# Patient Record
Sex: FEMALE | Race: WHITE | Employment: OTHER | URBAN - METROPOLITAN AREA
[De-identification: names, ages, dates, MRNs, and addresses within clinical notes are randomized per-mention and may not be internally consistent; named-entity substitution may affect disease eponyms.]

---

## 2018-12-19 PROBLEM — E11.21 TYPE 2 DIABETES WITH NEPHROPATHY (HCC): Status: ACTIVE | Noted: 2018-12-19

## 2020-06-17 ENCOUNTER — HOSPITAL ENCOUNTER (OUTPATIENT)
Dept: VASCULAR SURGERY | Age: 85
Discharge: HOME | End: 2020-06-17
Attending: INTERNAL MEDICINE
Payer: MEDICARE

## 2020-06-17 DIAGNOSIS — L98.9 LEG SKIN LESION, LEFT: ICD-10-CM

## 2020-06-17 DIAGNOSIS — I15.2 HYPERTENSION COMPLICATING DIABETES (HCC): ICD-10-CM

## 2020-06-17 DIAGNOSIS — E11.8 TYPE 2 DIABETES MELLITUS WITH COMPLICATION, WITHOUT LONG-TERM CURRENT USE OF INSULIN (HCC): ICD-10-CM

## 2020-06-17 DIAGNOSIS — Z86.718 HISTORY OF DVT (DEEP VEIN THROMBOSIS): ICD-10-CM

## 2020-06-17 DIAGNOSIS — E11.59 HYPERTENSION COMPLICATING DIABETES (HCC): ICD-10-CM

## 2020-06-17 DIAGNOSIS — G81.91 HEMIPARESIS, RIGHT (HCC): ICD-10-CM

## 2020-06-17 DIAGNOSIS — M79.89 LEFT LEG SWELLING: ICD-10-CM

## 2020-06-17 PROCEDURE — 93971 EXTREMITY STUDY: CPT

## 2022-03-19 PROBLEM — E11.21 TYPE 2 DIABETES WITH NEPHROPATHY (HCC): Status: ACTIVE | Noted: 2018-12-19

## 2022-05-12 ENCOUNTER — TRANSCRIBE ORDER (OUTPATIENT)
Dept: SCHEDULING | Age: 87
End: 2022-05-12

## 2022-05-12 DIAGNOSIS — R60.9 EDEMA: Primary | ICD-10-CM

## 2022-05-13 ENCOUNTER — HOSPITAL ENCOUNTER (OUTPATIENT)
Dept: VASCULAR SURGERY | Age: 87
Discharge: HOME | End: 2022-05-13
Attending: PODIATRIST
Payer: MEDICARE

## 2022-05-13 DIAGNOSIS — R60.9 EDEMA: ICD-10-CM

## 2022-05-13 PROCEDURE — 93971 EXTREMITY STUDY: CPT

## 2022-06-22 PROBLEM — D69.2 PURPURA (HCC): Status: ACTIVE | Noted: 2022-06-22

## 2023-11-14 ENCOUNTER — APPOINTMENT (OUTPATIENT)
Facility: HOSPITAL | Age: 88
End: 2023-11-14
Payer: MEDICARE

## 2023-11-14 ENCOUNTER — HOSPITAL ENCOUNTER (INPATIENT)
Facility: HOSPITAL | Age: 88
LOS: 4 days | Discharge: NURSING, REHAB OR LONG-TERM CARE | End: 2023-11-18
Attending: EMERGENCY MEDICINE | Admitting: STUDENT IN AN ORGANIZED HEALTH CARE EDUCATION/TRAINING PROGRAM
Payer: MEDICARE

## 2023-11-14 ENCOUNTER — APPOINTMENT (OUTPATIENT)
Facility: HOSPITAL | Age: 88
End: 2023-11-14
Attending: STUDENT IN AN ORGANIZED HEALTH CARE EDUCATION/TRAINING PROGRAM
Payer: MEDICARE

## 2023-11-14 DIAGNOSIS — J81.0 ACUTE PULMONARY EDEMA (HCC): ICD-10-CM

## 2023-11-14 DIAGNOSIS — U07.1 COVID-19: ICD-10-CM

## 2023-11-14 DIAGNOSIS — I1A.0 RESISTANT HYPERTENSION: Primary | ICD-10-CM

## 2023-11-14 DIAGNOSIS — J96.01 ACUTE RESPIRATORY FAILURE WITH HYPOXIA (HCC): ICD-10-CM

## 2023-11-14 DIAGNOSIS — I10 ACCELERATED HYPERTENSION: ICD-10-CM

## 2023-11-14 LAB
ALBUMIN SERPL-MCNC: 3.2 G/DL (ref 3.5–5)
ALBUMIN/GLOB SERPL: 0.7 (ref 1.1–2.2)
ALP SERPL-CCNC: 131 U/L (ref 45–117)
ALT SERPL-CCNC: 29 U/L (ref 12–78)
ANION GAP BLD CALC-SCNC: 10 (ref 10–20)
ANION GAP SERPL CALC-SCNC: 7 MMOL/L (ref 5–15)
APPEARANCE UR: ABNORMAL
APTT PPP: 28.5 SEC (ref 22.1–31)
AST SERPL-CCNC: 49 U/L (ref 15–37)
BACTERIA URNS QL MICRO: ABNORMAL /HPF
BASE DEFICIT BLD-SCNC: 3.7 MMOL/L
BASE DEFICIT BLDV-SCNC: 2.2 MMOL/L
BASOPHILS # BLD: 0 K/UL (ref 0–0.1)
BASOPHILS NFR BLD: 0 % (ref 0–1)
BDY SITE: ABNORMAL
BILIRUB SERPL-MCNC: 0.5 MG/DL (ref 0.2–1)
BILIRUB UR QL: NEGATIVE
BUN SERPL-MCNC: 48 MG/DL (ref 6–20)
BUN/CREAT SERPL: 35 (ref 12–20)
CA-I BLD-MCNC: 1.15 MMOL/L (ref 1.12–1.32)
CALCIUM SERPL-MCNC: 8.2 MG/DL (ref 8.5–10.1)
CHLORIDE BLD-SCNC: 101 MMOL/L (ref 100–108)
CHLORIDE SERPL-SCNC: 102 MMOL/L (ref 97–108)
CO2 BLD-SCNC: 25 MMOL/L (ref 19–24)
CO2 SERPL-SCNC: 26 MMOL/L (ref 21–32)
COLOR UR: ABNORMAL
CREAT SERPL-MCNC: 1.38 MG/DL (ref 0.55–1.02)
CREAT UR-MCNC: 1.3 MG/DL (ref 0.6–1.3)
CRP SERPL-MCNC: 7.04 MG/DL (ref 0–0.6)
DIFFERENTIAL METHOD BLD: ABNORMAL
ECHO AO ROOT DIAM: 3.1 CM
ECHO AO ROOT INDEX: 1.76 CM/M2
ECHO AR MAX VEL PISA: 4.4 M/S
ECHO AV AREA PEAK VELOCITY: 1.6 CM2
ECHO AV AREA VTI: 1.8 CM2
ECHO AV AREA/BSA VTI: 1 CM2/M2
ECHO AV CUSP MM: 1.3 CM
ECHO AV MEAN GRADIENT: 6 MMHG
ECHO AV MEAN VELOCITY: 1.2 M/S
ECHO AV PEAK GRADIENT: 11 MMHG
ECHO AV PEAK GRADIENT: 11 MMHG
ECHO AV PEAK VELOCITY: 1.7 M/S
ECHO AV PEAK VELOCITY: 1.7 M/S
ECHO AV REGURGITANT PHT: 323 MILLISECOND
ECHO AV VTI: 32.7 CM
ECHO BSA: 1.82 M2
ECHO EST RA PRESSURE: 10 MMHG
ECHO LA DIAMETER INDEX: 2.67 CM/M2
ECHO LA DIAMETER: 4.7 CM
ECHO LA TO AORTIC ROOT RATIO: 1.52
ECHO LA VOL A-L A2C: 109 ML (ref 22–52)
ECHO LA VOL A-L A4C: 121 ML (ref 22–52)
ECHO LA VOL MOD A2C: 105 ML (ref 22–52)
ECHO LA VOL MOD A4C: 106 ML (ref 22–52)
ECHO LA VOLUME AREA LENGTH: 117 ML
ECHO LA VOLUME INDEX A-L A2C: 62 ML/M2 (ref 16–34)
ECHO LA VOLUME INDEX A-L A4C: 69 ML/M2 (ref 16–34)
ECHO LA VOLUME INDEX AREA LENGTH: 66 ML/M2 (ref 16–34)
ECHO LA VOLUME INDEX MOD A2C: 60 ML/M2 (ref 16–34)
ECHO LA VOLUME INDEX MOD A4C: 60 ML/M2 (ref 16–34)
ECHO LV FRACTIONAL SHORTENING: 2 % (ref 28–44)
ECHO LV INTERNAL DIMENSION DIASTOLE INDEX: 2.5 CM/M2
ECHO LV INTERNAL DIMENSION DIASTOLIC: 4.4 CM (ref 3.9–5.3)
ECHO LV INTERNAL DIMENSION SYSTOLIC INDEX: 2.44 CM/M2
ECHO LV INTERNAL DIMENSION SYSTOLIC: 4.3 CM
ECHO LV IVSD: 1.3 CM (ref 0.6–0.9)
ECHO LV MASS 2D: 215.1 G (ref 67–162)
ECHO LV MASS INDEX 2D: 122.2 G/M2 (ref 43–95)
ECHO LV POSTERIOR WALL DIASTOLIC: 1.3 CM (ref 0.6–0.9)
ECHO LV RELATIVE WALL THICKNESS RATIO: 0.59
ECHO LVOT AREA: 3.5 CM2
ECHO LVOT AV VTI INDEX: 0.54
ECHO LVOT DIAM: 2.1 CM
ECHO LVOT MEAN GRADIENT: 1 MMHG
ECHO LVOT PEAK GRADIENT: 2 MMHG
ECHO LVOT PEAK GRADIENT: 3 MMHG
ECHO LVOT PEAK VELOCITY: 0.8 M/S
ECHO LVOT PEAK VELOCITY: 0.8 M/S
ECHO LVOT STROKE VOLUME INDEX: 34.6 ML/M2
ECHO LVOT SV: 60.9 ML
ECHO LVOT VTI: 17.6 CM
ECHO MV AREA PHT: 4.4 CM2
ECHO MV AREA VTI: 1.6 CM2
ECHO MV LVOT VTI INDEX: 2.2
ECHO MV MAX VELOCITY: 1.6 M/S
ECHO MV MEAN GRADIENT: 4 MMHG
ECHO MV MEAN VELOCITY: 0.9 M/S
ECHO MV PEAK GRADIENT: 10 MMHG
ECHO MV PRESSURE HALF TIME (PHT): 50.3 MS
ECHO MV REGURGITANT ALIASING (NYQUIST) VELOCITY: 42 CM/S
ECHO MV REGURGITANT RADIUS PISA: 0.66 CM
ECHO MV REGURGITANT VELOCITY PISA: 4.9 M/S
ECHO MV REGURGITANT VTIA: 169.1 CM
ECHO MV VTI: 38.8 CM
ECHO PV MAX VELOCITY: 0.6 M/S
ECHO PV PEAK GRADIENT: 1 MMHG
ECHO RIGHT VENTRICULAR SYSTOLIC PRESSURE (RVSP): 39 MMHG
ECHO RV FREE WALL PEAK S': 7 CM/S
ECHO RV INTERNAL DIMENSION: 4.4 CM
ECHO TV REGURGITANT MAX VELOCITY: 2.7 M/S
ECHO TV REGURGITANT PEAK GRADIENT: 29 MMHG
EKG ATRIAL RATE: 99 BPM
EKG DIAGNOSIS: NORMAL
EKG P AXIS: 35 DEGREES
EKG P-R INTERVAL: 158 MS
EKG Q-T INTERVAL: 354 MS
EKG QRS DURATION: 102 MS
EKG QTC CALCULATION (BAZETT): 454 MS
EKG R AXIS: 47 DEGREES
EKG T AXIS: -30 DEGREES
EKG VENTRICULAR RATE: 99 BPM
EOSINOPHIL # BLD: 0 K/UL (ref 0–0.4)
EOSINOPHIL NFR BLD: 1 % (ref 0–7)
EPITH CASTS URNS QL MICRO: ABNORMAL /LPF
ERYTHROCYTE [DISTWIDTH] IN BLOOD BY AUTOMATED COUNT: 14.1 % (ref 11.5–14.5)
ERYTHROCYTE [DISTWIDTH] IN BLOOD BY AUTOMATED COUNT: 14.5 % (ref 11.5–14.5)
ERYTHROCYTE [SEDIMENTATION RATE] IN BLOOD: 49 MM/HR (ref 0–30)
FIO2 ON VENT: 100 %
GLOBULIN SER CALC-MCNC: 4.7 G/DL (ref 2–4)
GLUCOSE BLD STRIP.AUTO-MCNC: 171 MG/DL (ref 74–106)
GLUCOSE SERPL-MCNC: 186 MG/DL (ref 65–100)
GLUCOSE UR STRIP.AUTO-MCNC: NEGATIVE MG/DL
HCO3 BLDA-SCNC: 24 MMOL/L
HCO3 BLDV-SCNC: 23 MMOL/L (ref 23–28)
HCT VFR BLD AUTO: 32.8 % (ref 35–47)
HCT VFR BLD AUTO: 36.2 % (ref 35–47)
HGB BLD-MCNC: 10.1 G/DL (ref 11.5–16)
HGB BLD-MCNC: 11.2 G/DL (ref 11.5–16)
HGB UR QL STRIP: NEGATIVE
HYALINE CASTS URNS QL MICRO: ABNORMAL /LPF (ref 0–5)
IMM GRANULOCYTES # BLD AUTO: 0.1 K/UL (ref 0–0.04)
IMM GRANULOCYTES NFR BLD AUTO: 1 % (ref 0–0.5)
INR PPP: 1.1 (ref 0.9–1.1)
KETONES UR QL STRIP.AUTO: NEGATIVE MG/DL
LACTATE BLD-SCNC: 1.86 MMOL/L (ref 0.4–2)
LEUKOCYTE ESTERASE UR QL STRIP.AUTO: ABNORMAL
LYMPHOCYTES # BLD: 1.7 K/UL (ref 0.8–3.5)
LYMPHOCYTES NFR BLD: 20 % (ref 12–49)
MCH RBC QN AUTO: 31.3 PG (ref 26–34)
MCH RBC QN AUTO: 32 PG (ref 26–34)
MCHC RBC AUTO-ENTMCNC: 30.8 G/DL (ref 30–36.5)
MCHC RBC AUTO-ENTMCNC: 30.9 G/DL (ref 30–36.5)
MCV RBC AUTO: 101.5 FL (ref 80–99)
MCV RBC AUTO: 103.4 FL (ref 80–99)
MONOCYTES # BLD: 0.7 K/UL (ref 0–1)
MONOCYTES NFR BLD: 9 % (ref 5–13)
NEUTS SEG # BLD: 6.1 K/UL (ref 1.8–8)
NEUTS SEG NFR BLD: 69 % (ref 32–75)
NITRITE UR QL STRIP.AUTO: NEGATIVE
NRBC # BLD: 0 K/UL (ref 0–0.01)
NRBC # BLD: 0 K/UL (ref 0–0.01)
NRBC BLD-RTO: 0 PER 100 WBC
NRBC BLD-RTO: 0 PER 100 WBC
NT PRO BNP: 8800 PG/ML
PCO2 BLDV: 38.8 MMHG (ref 41–51)
PCO2 BLDV: 56.4 MMHG (ref 41–51)
PEEP RESPIRATORY: 6
PH BLDV: 7.24 (ref 7.32–7.42)
PH BLDV: 7.38 (ref 7.32–7.42)
PH UR STRIP: 5.5 (ref 5–8)
PLATELET # BLD AUTO: 176 K/UL (ref 150–400)
PLATELET # BLD AUTO: 201 K/UL (ref 150–400)
PMV BLD AUTO: 10.6 FL (ref 8.9–12.9)
PMV BLD AUTO: 10.6 FL (ref 8.9–12.9)
PO2 BLDV: 57 MMHG (ref 25–40)
PO2 BLDV: <27 MMHG (ref 25–40)
POTASSIUM BLD-SCNC: 4.2 MMOL/L (ref 3.5–5.5)
POTASSIUM SERPL-SCNC: 4.1 MMOL/L (ref 3.5–5.1)
PROCALCITONIN SERPL-MCNC: 0.11 NG/ML
PROT SERPL-MCNC: 7.9 G/DL (ref 6.4–8.2)
PROT UR STRIP-MCNC: 30 MG/DL
PROTHROMBIN TIME: 11.4 SEC (ref 9–11.1)
RBC # BLD AUTO: 3.23 M/UL (ref 3.8–5.2)
RBC # BLD AUTO: 3.5 M/UL (ref 3.8–5.2)
RBC #/AREA URNS HPF: ABNORMAL /HPF (ref 0–5)
SAO2 % BLDV: 89 % (ref 65–88)
SAO2% DEVICE SAO2% SENSOR NAME: ABNORMAL
SARS-COV-2 RDRP RESP QL NAA+PROBE: DETECTED
SERVICE CMNT-IMP: ABNORMAL
SODIUM BLD-SCNC: 136 MMOL/L (ref 136–145)
SODIUM SERPL-SCNC: 135 MMOL/L (ref 136–145)
SOURCE: ABNORMAL
SP GR UR REFRACTOMETRY: 1.01
SPECIMEN SITE: ABNORMAL
SPECIMEN SITE: ABNORMAL
THERAPEUTIC RANGE: NORMAL SECS (ref 58–77)
TROPONIN I SERPL HS-MCNC: 2079 NG/L (ref 0–51)
TROPONIN I SERPL HS-MCNC: 882 NG/L (ref 0–51)
UFH PPP CHRO-ACNC: 0.75 IU/ML
UFH PPP CHRO-ACNC: <0.1 IU/ML
URINE CULTURE IF INDICATED: ABNORMAL
UROBILINOGEN UR QL STRIP.AUTO: 0.2 EU/DL (ref 0.2–1)
WBC # BLD AUTO: 6.9 K/UL (ref 3.6–11)
WBC # BLD AUTO: 8.6 K/UL (ref 3.6–11)
WBC URNS QL MICRO: ABNORMAL /HPF (ref 0–4)

## 2023-11-14 PROCEDURE — 81001 URINALYSIS AUTO W/SCOPE: CPT

## 2023-11-14 PROCEDURE — 6370000000 HC RX 637 (ALT 250 FOR IP): Performed by: STUDENT IN AN ORGANIZED HEALTH CARE EDUCATION/TRAINING PROGRAM

## 2023-11-14 PROCEDURE — 87077 CULTURE AEROBIC IDENTIFY: CPT

## 2023-11-14 PROCEDURE — 96365 THER/PROPH/DIAG IV INF INIT: CPT

## 2023-11-14 PROCEDURE — 3E0333Z INTRODUCTION OF ANTI-INFLAMMATORY INTO PERIPHERAL VEIN, PERCUTANEOUS APPROACH: ICD-10-PCS | Performed by: EMERGENCY MEDICINE

## 2023-11-14 PROCEDURE — 99285 EMERGENCY DEPT VISIT HI MDM: CPT

## 2023-11-14 PROCEDURE — 96375 TX/PRO/DX INJ NEW DRUG ADDON: CPT

## 2023-11-14 PROCEDURE — 84484 ASSAY OF TROPONIN QUANT: CPT

## 2023-11-14 PROCEDURE — 2580000003 HC RX 258: Performed by: STUDENT IN AN ORGANIZED HEALTH CARE EDUCATION/TRAINING PROGRAM

## 2023-11-14 PROCEDURE — 6360000002 HC RX W HCPCS: Performed by: EMERGENCY MEDICINE

## 2023-11-14 PROCEDURE — 85027 COMPLETE CBC AUTOMATED: CPT

## 2023-11-14 PROCEDURE — 2580000003 HC RX 258: Performed by: EMERGENCY MEDICINE

## 2023-11-14 PROCEDURE — 87040 BLOOD CULTURE FOR BACTERIA: CPT

## 2023-11-14 PROCEDURE — 82330 ASSAY OF CALCIUM: CPT

## 2023-11-14 PROCEDURE — 6360000002 HC RX W HCPCS: Performed by: NURSE PRACTITIONER

## 2023-11-14 PROCEDURE — 86140 C-REACTIVE PROTEIN: CPT

## 2023-11-14 PROCEDURE — 85730 THROMBOPLASTIN TIME PARTIAL: CPT

## 2023-11-14 PROCEDURE — 5A09357 ASSISTANCE WITH RESPIRATORY VENTILATION, LESS THAN 24 CONSECUTIVE HOURS, CONTINUOUS POSITIVE AIRWAY PRESSURE: ICD-10-PCS | Performed by: STUDENT IN AN ORGANIZED HEALTH CARE EDUCATION/TRAINING PROGRAM

## 2023-11-14 PROCEDURE — 2060000000 HC ICU INTERMEDIATE R&B

## 2023-11-14 PROCEDURE — XW0DXM6 INTRODUCTION OF BARICITINIB INTO MOUTH AND PHARYNX, EXTERNAL APPROACH, NEW TECHNOLOGY GROUP 6: ICD-10-PCS | Performed by: STUDENT IN AN ORGANIZED HEALTH CARE EDUCATION/TRAINING PROGRAM

## 2023-11-14 PROCEDURE — 85520 HEPARIN ASSAY: CPT

## 2023-11-14 PROCEDURE — 83880 ASSAY OF NATRIURETIC PEPTIDE: CPT

## 2023-11-14 PROCEDURE — 71045 X-RAY EXAM CHEST 1 VIEW: CPT

## 2023-11-14 PROCEDURE — C9113 INJ PANTOPRAZOLE SODIUM, VIA: HCPCS | Performed by: NURSE PRACTITIONER

## 2023-11-14 PROCEDURE — 96366 THER/PROPH/DIAG IV INF ADDON: CPT

## 2023-11-14 PROCEDURE — 87635 SARS-COV-2 COVID-19 AMP PRB: CPT

## 2023-11-14 PROCEDURE — 87086 URINE CULTURE/COLONY COUNT: CPT

## 2023-11-14 PROCEDURE — 82803 BLOOD GASES ANY COMBINATION: CPT

## 2023-11-14 PROCEDURE — 2580000003 HC RX 258: Performed by: NURSE PRACTITIONER

## 2023-11-14 PROCEDURE — 6360000002 HC RX W HCPCS: Performed by: STUDENT IN AN ORGANIZED HEALTH CARE EDUCATION/TRAINING PROGRAM

## 2023-11-14 PROCEDURE — 85025 COMPLETE CBC W/AUTO DIFF WBC: CPT

## 2023-11-14 PROCEDURE — 85610 PROTHROMBIN TIME: CPT

## 2023-11-14 PROCEDURE — 82947 ASSAY GLUCOSE BLOOD QUANT: CPT

## 2023-11-14 PROCEDURE — 93306 TTE W/DOPPLER COMPLETE: CPT

## 2023-11-14 PROCEDURE — A4216 STERILE WATER/SALINE, 10 ML: HCPCS | Performed by: NURSE PRACTITIONER

## 2023-11-14 PROCEDURE — 6360000004 HC RX CONTRAST MEDICATION: Performed by: STUDENT IN AN ORGANIZED HEALTH CARE EDUCATION/TRAINING PROGRAM

## 2023-11-14 PROCEDURE — 84145 PROCALCITONIN (PCT): CPT

## 2023-11-14 PROCEDURE — 87186 SC STD MICRODIL/AGAR DIL: CPT

## 2023-11-14 PROCEDURE — 93005 ELECTROCARDIOGRAM TRACING: CPT | Performed by: EMERGENCY MEDICINE

## 2023-11-14 PROCEDURE — 36415 COLL VENOUS BLD VENIPUNCTURE: CPT

## 2023-11-14 PROCEDURE — 84295 ASSAY OF SERUM SODIUM: CPT

## 2023-11-14 PROCEDURE — 71275 CT ANGIOGRAPHY CHEST: CPT

## 2023-11-14 PROCEDURE — 94660 CPAP INITIATION&MGMT: CPT

## 2023-11-14 PROCEDURE — 84132 ASSAY OF SERUM POTASSIUM: CPT

## 2023-11-14 PROCEDURE — 85652 RBC SED RATE AUTOMATED: CPT

## 2023-11-14 PROCEDURE — 6370000000 HC RX 637 (ALT 250 FOR IP): Performed by: EMERGENCY MEDICINE

## 2023-11-14 PROCEDURE — 80053 COMPREHEN METABOLIC PANEL: CPT

## 2023-11-14 PROCEDURE — 2700000000 HC OXYGEN THERAPY PER DAY

## 2023-11-14 RX ORDER — ATORVASTATIN CALCIUM 40 MG/1
40 TABLET, FILM COATED ORAL NIGHTLY
Status: DISCONTINUED | OUTPATIENT
Start: 2023-11-14 | End: 2023-11-18 | Stop reason: HOSPADM

## 2023-11-14 RX ORDER — FUROSEMIDE 10 MG/ML
40 INJECTION INTRAMUSCULAR; INTRAVENOUS 2 TIMES DAILY
Status: DISCONTINUED | OUTPATIENT
Start: 2023-11-14 | End: 2023-11-18 | Stop reason: HOSPADM

## 2023-11-14 RX ORDER — ASPIRIN 300 MG/1
300 SUPPOSITORY RECTAL
Status: COMPLETED | OUTPATIENT
Start: 2023-11-14 | End: 2023-11-14

## 2023-11-14 RX ORDER — SODIUM CHLORIDE 0.9 % (FLUSH) 0.9 %
5-40 SYRINGE (ML) INJECTION EVERY 12 HOURS SCHEDULED
Status: DISCONTINUED | OUTPATIENT
Start: 2023-11-14 | End: 2023-11-18 | Stop reason: HOSPADM

## 2023-11-14 RX ORDER — SODIUM CHLORIDE 9 MG/ML
INJECTION, SOLUTION INTRAVENOUS PRN
Status: DISCONTINUED | OUTPATIENT
Start: 2023-11-14 | End: 2023-11-18 | Stop reason: HOSPADM

## 2023-11-14 RX ORDER — SODIUM CHLORIDE 0.9 % (FLUSH) 0.9 %
5-40 SYRINGE (ML) INJECTION PRN
Status: DISCONTINUED | OUTPATIENT
Start: 2023-11-14 | End: 2023-11-18 | Stop reason: HOSPADM

## 2023-11-14 RX ORDER — POLYETHYLENE GLYCOL 3350 17 G/17G
17 POWDER, FOR SOLUTION ORAL DAILY PRN
Status: DISCONTINUED | OUTPATIENT
Start: 2023-11-14 | End: 2023-11-18 | Stop reason: HOSPADM

## 2023-11-14 RX ORDER — IPRATROPIUM BROMIDE AND ALBUTEROL SULFATE 2.5; .5 MG/3ML; MG/3ML
1 SOLUTION RESPIRATORY (INHALATION) EVERY 4 HOURS PRN
Status: DISCONTINUED | OUTPATIENT
Start: 2023-11-14 | End: 2023-11-18 | Stop reason: HOSPADM

## 2023-11-14 RX ORDER — ASPIRIN 81 MG/1
81 TABLET ORAL DAILY
Status: CANCELLED | OUTPATIENT
Start: 2023-11-14

## 2023-11-14 RX ORDER — HEPARIN SODIUM 10000 [USP'U]/100ML
5-30 INJECTION, SOLUTION INTRAVENOUS CONTINUOUS
Status: DISCONTINUED | OUTPATIENT
Start: 2023-11-14 | End: 2023-11-14

## 2023-11-14 RX ORDER — HEPARIN SODIUM 1000 [USP'U]/ML
4000 INJECTION, SOLUTION INTRAVENOUS; SUBCUTANEOUS PRN
Status: DISCONTINUED | OUTPATIENT
Start: 2023-11-14 | End: 2023-11-14

## 2023-11-14 RX ORDER — FUROSEMIDE 10 MG/ML
40 INJECTION INTRAMUSCULAR; INTRAVENOUS 2 TIMES DAILY
Status: DISCONTINUED | OUTPATIENT
Start: 2023-11-14 | End: 2023-11-14

## 2023-11-14 RX ORDER — METOPROLOL SUCCINATE 25 MG/1
25 TABLET, EXTENDED RELEASE ORAL DAILY
Status: DISCONTINUED | OUTPATIENT
Start: 2023-11-14 | End: 2023-11-18 | Stop reason: HOSPADM

## 2023-11-14 RX ORDER — NITROGLYCERIN 20 MG/100ML
5-200 INJECTION INTRAVENOUS CONTINUOUS
Status: DISCONTINUED | OUTPATIENT
Start: 2023-11-14 | End: 2023-11-14

## 2023-11-14 RX ORDER — ACETAMINOPHEN 650 MG/1
650 SUPPOSITORY RECTAL EVERY 6 HOURS PRN
Status: DISCONTINUED | OUTPATIENT
Start: 2023-11-14 | End: 2023-11-18 | Stop reason: HOSPADM

## 2023-11-14 RX ORDER — CASTOR OIL AND BALSAM, PERU 788; 87 MG/G; MG/G
OINTMENT TOPICAL 2 TIMES DAILY
Status: DISCONTINUED | OUTPATIENT
Start: 2023-11-14 | End: 2023-11-18 | Stop reason: HOSPADM

## 2023-11-14 RX ORDER — ASPIRIN 81 MG/1
81 TABLET, CHEWABLE ORAL DAILY
Status: DISCONTINUED | OUTPATIENT
Start: 2023-11-15 | End: 2023-11-18 | Stop reason: HOSPADM

## 2023-11-14 RX ORDER — HEPARIN SODIUM 1000 [USP'U]/ML
2000 INJECTION, SOLUTION INTRAVENOUS; SUBCUTANEOUS PRN
Status: DISCONTINUED | OUTPATIENT
Start: 2023-11-14 | End: 2023-11-14

## 2023-11-14 RX ORDER — ONDANSETRON 4 MG/1
4 TABLET, ORALLY DISINTEGRATING ORAL EVERY 8 HOURS PRN
Status: DISCONTINUED | OUTPATIENT
Start: 2023-11-14 | End: 2023-11-18 | Stop reason: HOSPADM

## 2023-11-14 RX ORDER — ACETAMINOPHEN 325 MG/1
650 TABLET ORAL EVERY 6 HOURS PRN
Status: DISCONTINUED | OUTPATIENT
Start: 2023-11-14 | End: 2023-11-18 | Stop reason: HOSPADM

## 2023-11-14 RX ORDER — ONDANSETRON 2 MG/ML
4 INJECTION INTRAMUSCULAR; INTRAVENOUS EVERY 6 HOURS PRN
Status: DISCONTINUED | OUTPATIENT
Start: 2023-11-14 | End: 2023-11-18 | Stop reason: HOSPADM

## 2023-11-14 RX ORDER — DEXAMETHASONE SODIUM PHOSPHATE 10 MG/ML
10 INJECTION, SOLUTION INTRAMUSCULAR; INTRAVENOUS ONCE
Status: COMPLETED | OUTPATIENT
Start: 2023-11-14 | End: 2023-11-14

## 2023-11-14 RX ORDER — HEPARIN SODIUM 1000 [USP'U]/ML
4000 INJECTION, SOLUTION INTRAVENOUS; SUBCUTANEOUS ONCE
Status: COMPLETED | OUTPATIENT
Start: 2023-11-14 | End: 2023-11-14

## 2023-11-14 RX ORDER — DEXAMETHASONE SODIUM PHOSPHATE 10 MG/ML
6 INJECTION, SOLUTION INTRAMUSCULAR; INTRAVENOUS EVERY 24 HOURS
Status: DISCONTINUED | OUTPATIENT
Start: 2023-11-15 | End: 2023-11-18 | Stop reason: HOSPADM

## 2023-11-14 RX ADMIN — CEFEPIME 1000 MG: 1 INJECTION, POWDER, FOR SOLUTION INTRAMUSCULAR; INTRAVENOUS at 15:43

## 2023-11-14 RX ADMIN — FUROSEMIDE 40 MG: 10 INJECTION, SOLUTION INTRAMUSCULAR; INTRAVENOUS at 17:01

## 2023-11-14 RX ADMIN — VANCOMYCIN HYDROCHLORIDE 1500 MG: 1.5 INJECTION, POWDER, LYOPHILIZED, FOR SOLUTION INTRAVENOUS at 06:24

## 2023-11-14 RX ADMIN — NITROGLYCERIN 5 MCG/MIN: 20 INJECTION INTRAVENOUS at 03:05

## 2023-11-14 RX ADMIN — METOPROLOL SUCCINATE 25 MG: 25 TABLET, EXTENDED RELEASE ORAL at 11:15

## 2023-11-14 RX ADMIN — AZITHROMYCIN MONOHYDRATE 500 MG: 500 INJECTION, POWDER, LYOPHILIZED, FOR SOLUTION INTRAVENOUS at 05:14

## 2023-11-14 RX ADMIN — IOPAMIDOL 100 ML: 755 INJECTION, SOLUTION INTRAVENOUS at 12:27

## 2023-11-14 RX ADMIN — SODIUM CHLORIDE, PRESERVATIVE FREE 40 MG: 5 INJECTION INTRAVENOUS at 11:16

## 2023-11-14 RX ADMIN — HEPARIN SODIUM 4000 UNITS: 1000 INJECTION INTRAVENOUS; SUBCUTANEOUS at 05:25

## 2023-11-14 RX ADMIN — SODIUM CHLORIDE, PRESERVATIVE FREE 10 ML: 5 INJECTION INTRAVENOUS at 11:21

## 2023-11-14 RX ADMIN — DEXAMETHASONE SODIUM PHOSPHATE 10 MG: 10 INJECTION, SOLUTION INTRAMUSCULAR; INTRAVENOUS at 04:19

## 2023-11-14 RX ADMIN — Medication: at 19:59

## 2023-11-14 RX ADMIN — ATORVASTATIN CALCIUM 40 MG: 40 TABLET, FILM COATED ORAL at 19:57

## 2023-11-14 RX ADMIN — ASPIRIN 300 MG: 300 SUPPOSITORY RECTAL at 04:48

## 2023-11-14 RX ADMIN — SODIUM CHLORIDE, PRESERVATIVE FREE 10 ML: 5 INJECTION INTRAVENOUS at 19:59

## 2023-11-14 RX ADMIN — CEFEPIME 2000 MG: 2 INJECTION, POWDER, FOR SOLUTION INTRAVENOUS at 04:42

## 2023-11-14 RX ADMIN — HEPARIN SODIUM AND DEXTROSE 12 UNITS/KG/HR: 10000; 5 INJECTION INTRAVENOUS at 05:29

## 2023-11-14 ASSESSMENT — PAIN - FUNCTIONAL ASSESSMENT: PAIN_FUNCTIONAL_ASSESSMENT: 0-10

## 2023-11-14 ASSESSMENT — ENCOUNTER SYMPTOMS
SHORTNESS OF BREATH: 1
COUGH: 1

## 2023-11-14 ASSESSMENT — PAIN SCALES - GENERAL
PAINLEVEL_OUTOF10: 0

## 2023-11-14 NOTE — CONSULTS
11/14/2023 9:51:00 AM     Urinalysis with Reflex to Culture    Collection Time: 11/14/23  3:41 AM    Specimen: Urine   Result Value Ref Range    Color, UA YELLOW/STRAW      Appearance CLOUDY (A) CLEAR      Specific Gravity, UA 1.010      pH, Urine 5.5 5.0 - 8.0      Protein, UA 30 (A) NEG mg/dL    Glucose, UA Negative NEG mg/dL    Ketones, Urine Negative NEG mg/dL    Bilirubin Urine Negative NEG      Blood, Urine Negative NEG      Urobilinogen, Urine 0.2 0.2 - 1.0 EU/dL    Nitrite, Urine Negative NEG      Leukocyte Esterase, Urine MODERATE (A) NEG      WBC, UA  0 - 4 /hpf    RBC, UA 0-5 0 - 5 /hpf    Epithelial Cells UA FEW FEW /lpf    BACTERIA, URINE 4+ (A) NEG /hpf    Urine Culture if Indicated URINE CULTURE ORDERED (A) CNI      Hyaline Casts, UA 0-2 0 - 5 /lpf   Blood Culture 1    Collection Time: 11/14/23  4:25 AM    Specimen: Blood   Result Value Ref Range    Special Requests RIGHT  FOREARM        Culture NO GROWTH AFTER 1 HOUR     Blood Culture 2    Collection Time: 11/14/23  4:25 AM    Specimen: Blood   Result Value Ref Range    Special Requests LEFT  Antecubital        Culture NO GROWTH AFTER 1 HOUR     Procalcitonin    Collection Time: 11/14/23  4:25 AM   Result Value Ref Range    Procalcitonin 0.11 ng/mL   Sedimentation Rate    Collection Time: 11/14/23  4:25 AM   Result Value Ref Range    Sed Rate, Automated 49 (H) 0 - 30 mm/hr   CBC    Collection Time: 11/14/23  4:41 AM   Result Value Ref Range    WBC 6.9 3.6 - 11.0 K/uL    RBC 3.23 (L) 3.80 - 5.20 M/uL    Hemoglobin 10.1 (L) 11.5 - 16.0 g/dL    Hematocrit 32.8 (L) 35.0 - 47.0 %    .5 (H) 80.0 - 99.0 FL    MCH 31.3 26.0 - 34.0 PG    MCHC 30.8 30.0 - 36.5 g/dL    RDW 14.1 11.5 - 14.5 %    Platelets 097 420 - 886 K/uL    MPV 10.6 8.9 - 12.9 FL    Nucleated RBCs 0.0 0  WBC    nRBC 0.00 0.00 - 0.01 K/uL   APTT    Collection Time: 11/14/23  4:41 AM   Result Value Ref Range    PTT 28.5 22.1 - 31.0 sec    Therapeutic Range   58.0 - 77.0 SECS Protime-INR    Collection Time: 11/14/23  4:41 AM   Result Value Ref Range    INR 1.1 0.9 - 1.1      Protime 11.4 (H) 9.0 - 11.1 sec   Heparin, Anti-Xa    Collection Time: 11/14/23  4:41 AM   Result Value Ref Range    Heparin Xa,LMWH and Unfrac <0.10 IU/mL   Troponin    Collection Time: 11/14/23  8:04 AM   Result Value Ref Range    Troponin, High Sensitivity 2,079 (HH) 0 - 51 ng/L   Blood Gas, Venous    Collection Time: 11/14/23  8:49 AM   Result Value Ref Range    pH, Leonid 7.38 7.32 - 7.42      pCO2, Leonid 38.8 (L) 41 - 51 mmHg    PO2, Leonid 57 (H) 25 - 40 mmHg    BICARBONATE, VENOUS 23 23 - 28 mmol/L    Base Deficit, Venous 2.2 mmol/L    O2 Sat, Leonid 89 (H) 65 - 88 %    O2 Method BIPAP      FIO2 Arterial 100 %    POC PEEP/CPA 6.0      Source VENOUS BLOOD      Site RIGHT RADIAL     Heparin, Anti-Xa    Collection Time: 11/14/23 11:20 AM   Result Value Ref Range    Heparin Xa,LMWH and Unfrac 0.75 IU/mL   C-Reactive Protein    Collection Time: 11/14/23 11:20 AM   Result Value Ref Range    CRP 7.04 (H) 0.00 - 0.60 mg/dL

## 2023-11-14 NOTE — ED NOTES
Assumed care of pt at this time. Heparin drip verified with offgoing RN Suhail Banegas.  Pt adjusted in bed, daughter at bedside, update given     Roxann Villeda RN  11/14/23 4495

## 2023-11-14 NOTE — ED NOTES
Hospitalist perfect served at this time about trialing pt off BIPAP.      Sophy Ruffin RN  11/14/23 1962

## 2023-11-14 NOTE — PROGRESS NOTES
Physical Therapy    Received PT order. Chart reviewed. Pt on BiPap this am, on hep drip with NSTEMI and positive for COVID. Pt still too acute to tolerate eval. Will defer and follow.     Chiquita Hartman PT

## 2023-11-14 NOTE — H&P
Hospitalist Admission Note    NAME:   Osei Arzate   : 1927   MRN: [id number]     Date/Time: 2023 5:48 AM    Patient PCP: Tre Bowser MD    ______________________________________________________________________  Given the patient's current clinical presentation, I have a high level of concern for decompensation if discharged from the emergency department. Complex decision making was performed, which includes reviewing the patient's available past medical records, laboratory results, and x-ray films. My assessment of this patient's clinical condition and my plan of care is as follows. Assessment / Plan:  Acute hypoxemic respiratory failure  COVID-pneumonia  Bilateral pleural effusion  Chest x-ray showed congestion with interstitial edema mainly in the right LL  We will get CTA of the chest  Patient currently on BiPAP  Patient DNR/DNI  Stable for PCU per ICU eval  Start empirically on broad-spectrum antibiotic  Azithromycin for atypical pneumonia coverage  We will consult pulm  DuoNeb as needed and scheduled  Decadron 6 Mg daily  Repeat ABG      NSTEMI  S/p nitro drip  Elevated proBNP 8K  Start patient on heparin drip  Cardio consult  Continue statin, metoprolol  We will start patient on aspirin  We will get echo      HTN  Blood pressure borderline  Hold home amlodipine      DAPHNIE  We will be cautious with IV fluid  Diuresis as needed  Repeat BMP tomorrow    HLD  History of CVA, right hemiparesis    Diabetes mellitus  We will add sliding scale     P T A trigeminal neuralgia  PTA skin lesions  Not sure if the patient still on oxcarbazepine   We will get pharmacy med rec    Medical Decision Making:   I personally reviewed labs: Yes  I personally reviewed imaging: Yes  I personally reviewed EKG:  Toxic drug monitoring: Yes  Discussed case with: ED provider. After discussion I am in agreement that acuity of patient's medical condition necessitates hospital stay.       Code Status:

## 2023-11-14 NOTE — PROGRESS NOTES
Received OT order. Chart reviewed. Pt on BiPap this am, on hep drip with NSTEMI and positive for COVID. Pt still too acute to tolerate eval. Will defer and follow.

## 2023-11-14 NOTE — PROGRESS NOTES
4 Eyes Skin Assessment     NAME:  Jennifer Mays  YOB: 1927  MEDICAL RECORD NUMBER:  [id number]    The patient is being assessed for  Admission    I agree that at least one RN has performed a thorough Head to Toe Skin Assessment on the patient. ALL assessment sites listed below have been assessed. Areas assessed by both nurses:    Head, Face, Ears, Arms, Elbows, Hands, Sacrum. Buttock, Coccyx, Ischium, and Legs. Feet and Heels        Does the Patient have a Wound? Yes wound(s) were present on assessment.  LDA wound assessment was Initiated and completed by RN, Skin tags, boggy heels, blanchable redness on sacrum, skin breakdown due to moisture under breast and groin areas       Jaciel Prevention initiated by RN: Yes  Wound Care Orders initiated by RN: Yes    Pressure Injury (Stage 3,4, Unstageable, DTI, NWPT, and Complex wounds) if present, place Wound referral order by RN under : No    New Ostomies, if present place, Ostomy referral order under : No     Nurse 1 eSignature: Electronically signed by Bakari Bolton RN on 11/14/23 at 6:27 PM EST    **SHARE this note so that the co-signing nurse can place an eSignature**    Nurse 2 eSignature: Electronically signed by Samra Art RN on 11/14/23 at 6:32 PM EST

## 2023-11-14 NOTE — ED NOTES
Verified with [facility] in Pharmacy regarding Heparin. One time initial bolus 4000units. Starting drip at 12 units/kg/hr.      Kali Ortiz RN  11/14/23 4686

## 2023-11-14 NOTE — ED NOTES
Bedside shift change report given to SHA Solomon (oncoming nurse) by Mario Neff RN (offgoing nurse). Report included the following information Nurse Handoff Report, ED SBAR, MAR, Recent Results, and Neuro Assessment.           Kateryna Burgos RN  11/14/23 0059

## 2023-11-14 NOTE — ED PROVIDER NOTES
MRM [id number]       Pt Name: Juli Marx  MRN: [id number]  [facility] 5/23/1927  Date of evaluation: 11/14/2023  Provider: Rock Vahid DO   PCP: Alan Monet MD  Note Started: 2:45 AM EST 11/14/23     CHIEF COMPLAINT       Chief Complaint   Patient presents with    Respiratory Distress     Pt arrives via EMS from [address] [place] [address]. Pt KARLA throughout day and tried to go to bed but was having difficulty. EMS arrived on scene and was 70% RA, auditory rhales, edema in lower extremities. EMS put on CPAP at 93%. EMS gave 40mg lasix and 1inch nitro paste. BG for . HISTORY OF PRESENT ILLNESS: 1 or more elements      History From: EMS, History limited by: Clinical condition     Juli Marx is a 80 y.o. female with a past medical history significant for hypertension, prior stroke, diabetes presented the emergency department in respiratory distress. EMS found the patient to be hypoxic on room air with oxygen saturations in the 70s. She was reportedly tachypneic and had Rales on exam.  Moderately hypertensive. Was started on noninvasive positive pressure ventilation prehospital, given 40 of Lasix and an inch of Nitropaste. EMS reports significant improvement. Please See MDM for Additional Details of the HPI/PMH  Nursing Notes were all reviewed and agreed with or any disagreements were addressed in the HPI. REVIEW OF SYSTEMS        Positives and Pertinent negatives as per HPI. PAST HISTORY     Past Medical History:  Past Medical History:   Diagnosis Date    Arthritis     RASHARD KNEES.     C1 cervical fracture ([address]) 7/19/2013 6/9/13    Cancer ([address])     SCC skin ca from left face    DJD (degenerative joint disease) of knee 07/15/2011    Hyperlipidemia 07/07/2016    Hypertension complicating diabetes ([address]) 09/08/2000    Ill-defined condition 6/2013    C1 FX AFTER FALL    Pressure ulcer of left buttock, stage 3 ([address]) 10/19/2016    About 7/23/16    Stroke ([address])     Trigeminal neuralgia 7/15/2011    Type 2 diabetes mellitus with complication, without long-term current use of insulin ([address]) 11/23/2016       Past Surgical History:  Past Surgical History:   Procedure Laterality Date    GYN  1982    D and C    HEENT      tonsillectomy    IR IVC FILTER PLACEMENT W IMAGING      OTHER SURGICAL HISTORY      BCC skin ca. removed -face       Family History:  Family History   Problem Relation Age of Onset    Osteoarthritis Sister     Heart Attack Father     Stroke Mother        Social History:  Social History     Tobacco Use    Smoking status: Never    Smokeless tobacco: Never   Substance Use Topics    Alcohol use: Yes    Drug use: Not Currently       Allergies: Allergies   Allergen Reactions    Lisinopril      Other reaction(s): Unknown (comments)       CURRENT MEDICATIONS      Current Discharge Medication List        CONTINUE these medications which have NOT CHANGED    Details   !! OXcarbazepine (TRILEPTAL) 150 MG tablet Take one tablet twice a day  Qty: 60 tablet, Refills: 11      metoprolol succinate (TOPROL XL) 25 MG extended release tablet TAKE 1 TABLET BY MOUTH ONCE A DAY FOR HYPERTENSION  Qty: 27 tablet, Refills: 11      !! OXcarbazepine (TRILEPTAL) 150 MG tablet Take 1 tablet by mouth 2 times daily      !! pravastatin (PRAVACHOL) 10 MG tablet Take 1 tablet by mouth daily  Qty: 90 tablet, Refills: 3    Associated Diagnoses: Type 2 diabetes mellitus with other diabetic neurological complication ([address]); Hypercholesterolemia; Encounter for long-term (current) use of medications      acetaminophen (TYLENOL) 500 MG tablet Take by mouth every 6 hours as needed      amLODIPine (NORVASC) 2.5 MG tablet TAKE 1 TABLET DAILY      diclofenac sodium (VOLTAREN) 1 % GEL Apply 4 g topically 4 times daily      !! pravastatin (PRAVACHOL) 10 MG tablet TAKE 1 TABLET NIGHTLY       !! - Potential duplicate medications found. Please discuss with provider.           SCREENINGS

## 2023-11-14 NOTE — ED NOTES
Report called to PCU at this time.  Pt to be taken up on [facility], [address] [place] [address], RN  11/14/23 6052

## 2023-11-14 NOTE — ED NOTES
Message sent to pharmacy regarding both inter lapping Vancomycin meds, awaiting response     Caitlyn Brumfield RN  11/14/23 0163

## 2023-11-14 NOTE — PROGRESS NOTES
Patient seen and evaluated in ED room. For details see H and p done by my colleague earlier today. She has been weaned to high flow, on my evaluation on 10 liters oxygen. CTA chest reviewed: shows no PE, has bilteral infiltrates with bilateral effusions. Covid also came back positive    Added lasix 40 mg q12 hours  Continue decadron  Pharmacy consult for barcitinib  Continue emperic abx  Also has elevated troponin likely demand ischemia  Cardiology has been consulted.     Non billable note

## 2023-11-14 NOTE — ED NOTES
Verified with [facility] in Pharmacy to do the 1500 mg of Vancomycin.       Santa Augustin, RN  11/14/23 9420

## 2023-11-15 LAB
ANION GAP SERPL CALC-SCNC: 5 MMOL/L (ref 5–15)
BACTERIA SPEC CULT: NORMAL
BACTERIA SPEC CULT: NORMAL
BASOPHILS # BLD: 0 K/UL (ref 0–0.1)
BASOPHILS NFR BLD: 0 % (ref 0–1)
BUN SERPL-MCNC: 45 MG/DL (ref 6–20)
BUN/CREAT SERPL: 33 (ref 12–20)
CALCIUM SERPL-MCNC: 8.5 MG/DL (ref 8.5–10.1)
CHLORIDE SERPL-SCNC: 104 MMOL/L (ref 97–108)
CO2 SERPL-SCNC: 29 MMOL/L (ref 21–32)
CREAT SERPL-MCNC: 1.35 MG/DL (ref 0.55–1.02)
DIFFERENTIAL METHOD BLD: ABNORMAL
EOSINOPHIL # BLD: 0 K/UL (ref 0–0.4)
EOSINOPHIL NFR BLD: 0 % (ref 0–7)
ERYTHROCYTE [DISTWIDTH] IN BLOOD BY AUTOMATED COUNT: 14.3 % (ref 11.5–14.5)
GLUCOSE BLD STRIP.AUTO-MCNC: 147 MG/DL (ref 65–117)
GLUCOSE BLD STRIP.AUTO-MCNC: 159 MG/DL (ref 65–117)
GLUCOSE SERPL-MCNC: 118 MG/DL (ref 65–100)
HCT VFR BLD AUTO: 33.9 % (ref 35–47)
HGB BLD-MCNC: 10.6 G/DL (ref 11.5–16)
IMM GRANULOCYTES # BLD AUTO: 0 K/UL (ref 0–0.04)
IMM GRANULOCYTES NFR BLD AUTO: 0 % (ref 0–0.5)
LYMPHOCYTES # BLD: 1.3 K/UL (ref 0.8–3.5)
LYMPHOCYTES NFR BLD: 15 % (ref 12–49)
MCH RBC QN AUTO: 31.4 PG (ref 26–34)
MCHC RBC AUTO-ENTMCNC: 31.3 G/DL (ref 30–36.5)
MCV RBC AUTO: 100.3 FL (ref 80–99)
MONOCYTES # BLD: 0.6 K/UL (ref 0–1)
MONOCYTES NFR BLD: 7 % (ref 5–13)
NEUTS SEG # BLD: 6.8 K/UL (ref 1.8–8)
NEUTS SEG NFR BLD: 78 % (ref 32–75)
NRBC # BLD: 0 K/UL (ref 0–0.01)
NRBC BLD-RTO: 0 PER 100 WBC
NT PRO BNP: ABNORMAL PG/ML
PLATELET # BLD AUTO: 187 K/UL (ref 150–400)
PMV BLD AUTO: 11.1 FL (ref 8.9–12.9)
POTASSIUM SERPL-SCNC: 3.8 MMOL/L (ref 3.5–5.1)
RBC # BLD AUTO: 3.38 M/UL (ref 3.8–5.2)
SERVICE CMNT-IMP: ABNORMAL
SERVICE CMNT-IMP: ABNORMAL
SERVICE CMNT-IMP: NORMAL
SODIUM SERPL-SCNC: 138 MMOL/L (ref 136–145)
VANCOMYCIN SERPL-MCNC: 13.6 UG/ML
WBC # BLD AUTO: 8.7 K/UL (ref 3.6–11)

## 2023-11-15 PROCEDURE — 97535 SELF CARE MNGMENT TRAINING: CPT

## 2023-11-15 PROCEDURE — 6370000000 HC RX 637 (ALT 250 FOR IP): Performed by: STUDENT IN AN ORGANIZED HEALTH CARE EDUCATION/TRAINING PROGRAM

## 2023-11-15 PROCEDURE — 36415 COLL VENOUS BLD VENIPUNCTURE: CPT

## 2023-11-15 PROCEDURE — C9113 INJ PANTOPRAZOLE SODIUM, VIA: HCPCS | Performed by: NURSE PRACTITIONER

## 2023-11-15 PROCEDURE — 97166 OT EVAL MOD COMPLEX 45 MIN: CPT

## 2023-11-15 PROCEDURE — 97162 PT EVAL MOD COMPLEX 30 MIN: CPT

## 2023-11-15 PROCEDURE — 2060000000 HC ICU INTERMEDIATE R&B

## 2023-11-15 PROCEDURE — 6370000000 HC RX 637 (ALT 250 FOR IP): Performed by: NURSE PRACTITIONER

## 2023-11-15 PROCEDURE — 97530 THERAPEUTIC ACTIVITIES: CPT

## 2023-11-15 PROCEDURE — 82962 GLUCOSE BLOOD TEST: CPT

## 2023-11-15 PROCEDURE — 80202 ASSAY OF VANCOMYCIN: CPT

## 2023-11-15 PROCEDURE — 2700000000 HC OXYGEN THERAPY PER DAY

## 2023-11-15 PROCEDURE — 85025 COMPLETE CBC W/AUTO DIFF WBC: CPT

## 2023-11-15 PROCEDURE — 80048 BASIC METABOLIC PNL TOTAL CA: CPT

## 2023-11-15 PROCEDURE — 83880 ASSAY OF NATRIURETIC PEPTIDE: CPT

## 2023-11-15 PROCEDURE — A4216 STERILE WATER/SALINE, 10 ML: HCPCS | Performed by: NURSE PRACTITIONER

## 2023-11-15 PROCEDURE — 6360000002 HC RX W HCPCS: Performed by: NURSE PRACTITIONER

## 2023-11-15 PROCEDURE — 2580000003 HC RX 258: Performed by: STUDENT IN AN ORGANIZED HEALTH CARE EDUCATION/TRAINING PROGRAM

## 2023-11-15 PROCEDURE — 2580000003 HC RX 258: Performed by: NURSE PRACTITIONER

## 2023-11-15 PROCEDURE — 6360000002 HC RX W HCPCS: Performed by: STUDENT IN AN ORGANIZED HEALTH CARE EDUCATION/TRAINING PROGRAM

## 2023-11-15 RX ORDER — INSULIN LISPRO 100 [IU]/ML
0-8 INJECTION, SOLUTION INTRAVENOUS; SUBCUTANEOUS
Status: DISCONTINUED | OUTPATIENT
Start: 2023-11-15 | End: 2023-11-18 | Stop reason: HOSPADM

## 2023-11-15 RX ORDER — INSULIN LISPRO 100 [IU]/ML
0-4 INJECTION, SOLUTION INTRAVENOUS; SUBCUTANEOUS NIGHTLY
Status: DISCONTINUED | OUTPATIENT
Start: 2023-11-15 | End: 2023-11-18 | Stop reason: HOSPADM

## 2023-11-15 RX ORDER — HEPARIN SODIUM 5000 [USP'U]/ML
5000 INJECTION, SOLUTION INTRAVENOUS; SUBCUTANEOUS EVERY 8 HOURS SCHEDULED
Status: DISCONTINUED | OUTPATIENT
Start: 2023-11-15 | End: 2023-11-18 | Stop reason: HOSPADM

## 2023-11-15 RX ORDER — DEXTROSE MONOHYDRATE 100 MG/ML
INJECTION, SOLUTION INTRAVENOUS CONTINUOUS PRN
Status: DISCONTINUED | OUTPATIENT
Start: 2023-11-15 | End: 2023-11-18 | Stop reason: HOSPADM

## 2023-11-15 RX ADMIN — SODIUM CHLORIDE, PRESERVATIVE FREE 10 ML: 5 INJECTION INTRAVENOUS at 20:35

## 2023-11-15 RX ADMIN — AZITHROMYCIN MONOHYDRATE 500 MG: 500 INJECTION, POWDER, LYOPHILIZED, FOR SOLUTION INTRAVENOUS at 08:11

## 2023-11-15 RX ADMIN — ASPIRIN 81 MG: 81 TABLET, CHEWABLE ORAL at 08:13

## 2023-11-15 RX ADMIN — DEXAMETHASONE SODIUM PHOSPHATE 6 MG: 10 INJECTION, SOLUTION INTRAMUSCULAR; INTRAVENOUS at 04:03

## 2023-11-15 RX ADMIN — FUROSEMIDE 40 MG: 10 INJECTION, SOLUTION INTRAMUSCULAR; INTRAVENOUS at 08:12

## 2023-11-15 RX ADMIN — CEFEPIME 1000 MG: 1 INJECTION, POWDER, FOR SOLUTION INTRAMUSCULAR; INTRAVENOUS at 04:00

## 2023-11-15 RX ADMIN — HEPARIN SODIUM 5000 UNITS: 5000 INJECTION INTRAVENOUS; SUBCUTANEOUS at 17:46

## 2023-11-15 RX ADMIN — ATORVASTATIN CALCIUM 40 MG: 40 TABLET, FILM COATED ORAL at 20:35

## 2023-11-15 RX ADMIN — FUROSEMIDE 40 MG: 10 INJECTION, SOLUTION INTRAMUSCULAR; INTRAVENOUS at 17:46

## 2023-11-15 RX ADMIN — CEFEPIME 1000 MG: 1 INJECTION, POWDER, FOR SOLUTION INTRAMUSCULAR; INTRAVENOUS at 16:09

## 2023-11-15 RX ADMIN — SODIUM CHLORIDE, PRESERVATIVE FREE 40 MG: 5 INJECTION INTRAVENOUS at 08:13

## 2023-11-15 RX ADMIN — Medication: at 08:13

## 2023-11-15 RX ADMIN — VANCOMYCIN HYDROCHLORIDE 500 MG: 500 INJECTION, POWDER, LYOPHILIZED, FOR SOLUTION INTRAVENOUS at 09:16

## 2023-11-15 RX ADMIN — Medication: at 20:36

## 2023-11-15 RX ADMIN — BARICITINIB 2 MG: 2 TABLET, FILM COATED ORAL at 14:32

## 2023-11-15 RX ADMIN — METOPROLOL SUCCINATE 25 MG: 25 TABLET, EXTENDED RELEASE ORAL at 08:13

## 2023-11-15 RX ADMIN — SODIUM CHLORIDE, PRESERVATIVE FREE 10 ML: 5 INJECTION INTRAVENOUS at 08:13

## 2023-11-15 RX ADMIN — HEPARIN SODIUM 5000 UNITS: 5000 INJECTION INTRAVENOUS; SUBCUTANEOUS at 10:03

## 2023-11-15 ASSESSMENT — PAIN SCALES - GENERAL
PAINLEVEL_OUTOF10: 0

## 2023-11-15 NOTE — PROGRESS NOTES
1430- Received report from [facility] from ED.    1701- Heparin gtt stopped, order dicontinued    End of Shift Note    Bedside shift change report given to [facility] Rekha Earl (oncoming nurse) by Kirkland Boast, RN (offgoing nurse). Report included the following information SBAR, MAR, and Cardiac Rhythm NSR    Shift worked:  5033-7983     Shift summary and any significant changes:     Patient on 15 L Hi Flow, heparin gtt stopped, echo done at bedside. Concerns for physician to address:  May benefit from specialty bed     Zone phone for oncoming shift:   [phone]       Activity:     Number times ambulated in hallways past shift: 0  Number of times OOB to chair past shift: 0    Cardiac:   Cardiac Monitoring: Yes           Access:  Current line(s): PIV     Genitourinary:   Urinary status: voiding and external catheter    Respiratory:      Chronic home O2 use?: NO  Incentive spirometer at bedside: NO       GI:     Current diet:  ADULT DIET;  Regular; Low Fat/Low Chol/High Fiber/JÚNIOR  Passing flatus: YES  Tolerating current diet: YES       Pain Management:   Patient states pain is manageable on current regimen: YES    Skin:     Interventions: float heels, foam dressing, PT/OT consult, internal/external urinary devices, and nutritional support    Patient Safety:  Fall Score:    Interventions: bed/chair alarm, gripper socks, pt to call before getting OOB, and stay with me (per policy)       Length of Stay:  Expected LOS: 3  Actual LOS: 0      Kirkland Boast, RN

## 2023-11-15 NOTE — PROGRESS NOTES
End of Shift Note    Bedside shift change report given to Sharon Nicholson RN (oncoming nurse) by Vy Hart RN (offgoing nurse). Report included the following information SBAR, Kardex, Intake/Output, MAR, Recent Results, and Med Rec Status    Shift worked:  night     Shift summary and any significant changes:     No significant changes overnight     Concerns for physician to address: none     Zone phone for oncoming shift:       Activity:     Number times ambulated in hallways past shift: 0  Number of times OOB to chair past shift: 0    Cardiac:   Cardiac Monitoring: Yes           Access:  Current line(s): PIV     Genitourinary:   Urinary status: voiding and external catheter    Respiratory:      Chronic home O2 use?: YES  Incentive spirometer at bedside: YES       GI:     Current diet:  ADULT DIET; Regular; Low Fat/Low Chol/High Fiber/JÚNIOR  Passing flatus: YES  Tolerating current diet: YES       Pain Management:   Patient states pain is manageable on current regimen: YES    Skin:     Interventions: specialty bed, float heels, increase time out of bed, foam dressing, and PT/OT consult    Patient Safety:  Fall Score:    Interventions: bed/chair alarm, assistive device (walker, cane.  etc), gripper socks, pt to call before getting OOB, and stay with me (per policy)       Length of Stay:  Expected LOS: 3  Actual LOS: [address] RN

## 2023-11-15 NOTE — CARE COORDINATION
Care Management Initial Assessment     D/C plan: Pt will return to [address] living with [facility] (OUTPATIENT CAMPUS)      RUR: 18% \"moderate risk\"  Readmission? No  1st IM letter given? Yes   1st  letter given: No     Initial note: Chart reviewed for updates. CM deferred assessment to daughter as pt was asleep. CM contacted pt's daughter Mary Alice Nascimento [phone], introduced role, confirmed demographics and discussed d/c planning. Pt lives at [facility] assisted living, address: [address] [facility], [address] [place] [address]. Daughter reports that pt has been living at the assisted living since September 20 th 2023. Pt is dependent with ADL's and gets assistance from the staff members at the assisted living. Pt ambulates with a Rollator at the facility however, the daughter reports that pt only ambulates for short distances. Pt has a history of SNF after a stroke a few years ago. Pt has also uses [facility] services in the past and is currently open with [address] at the facility. Pt uses [facility] to get her medication and [address] at [facility] in [place], [address] [place] [address]. Pt's daughter reports that pt will likely need BLS transport at time of d/c. PT/OT recommends HH. Pt is open with HH at the facility. Pt's daughter is the POA and Legal-next of kin and she is listed as so in the ACP documents. Advance Care Planning     General Advance Care Planning (ACP) Conversation    Date of Conversation: 11/14/2023  Conducted with: Patient with Decision Making Capacity    Healthcare Decision Maker:    Primary Decision Maker: Mary Alice Nascimento - Child - [phone]  Click here to complete Healthcare Decision Makers including selection of the Healthcare Decision Maker Relationship (ie \"Primary\"). Today we documented Decision Maker(s) consistent with ACP documents on file. Content/Action Overview:   Has ACP document(s) on file - reflects the patient's care preferences  Reviewed DNR/DNI and patient confirms current DNR status - completed forms on file (place new order if needed)        Length of Voluntary ACP Conversation in minutes:  <16 minutes (Non-Billable)    Kate Sigala      Complete assessment is below:       11/15/23 1510   Service Assessment   Patient Orientation Unable to Assess  (Pt was asleep)   Cognition Alert   History Provided By Child/Family   Primary Caregiver Other (Comment)  (Staff at the assited living)   Accompanied By/Relationship [address] is: Named in [address] [place] [address] Avenue with the following:;Bathing;Dressing   Current Functional Level Assistance with the following:;Bathing;Dressing; Toileting;Mobility   Can patient return to prior living arrangement Yes   Ability to make needs known: Good   Family able to assist with home care needs: Yes   Would you like for me to discuss the discharge plan with any other family members/significant others, and if so, who? Yes  (Children)   Financial Resources Medicare; Other (Comment)  ( for Life)   Community Resources None   Social/Functional History   Lives With Other (comment); Alone   Type of Home Assisted living  ([address] [place] [address] [facility])   Bathroom Shower/Tub Walk-in shower   Bathroom Toilet Handicap height   Bathroom Equipment Built-in shower seat;Grab bars around toilet;Grab bars in shower; Toilet raiser   [place], 4 wheeled; Walker, rolling   ADL Assistance Needs assistance   Toileting Independent   Ambulation Assistance Independent   Transfer Assistance Independent   Active  No   Patient's  Info Daughter or facility provides transport   Discharge Planning   Type of Residence Assisted living   Living Arrangements Alone   Current Services Prior To Admission Durable Medical Equipment   Current DME Prior to [address] Needed N/A   DME Ordered?  No   Patient expects to be discharged to:

## 2023-11-15 NOTE — PLAN OF CARE
Problem: Physical Therapy - Adult  Goal: By Discharge: Performs mobility at highest level of function for planned discharge setting. See evaluation for individualized goals. Description: FUNCTIONAL STATUS PRIOR TO ADMISSION: Patient was modified independent using a rolling walker for functional mobility in her apartment ([facility]). She has supervision when walking to dining carreon. Has assist from staff at [address] [place] [address] for ADLs. HOME SUPPORT PRIOR TO ADMISSION: The patient lived with at [address] [place] [address]. Physical Therapy Goals  Initiated 11/15/2023  1. Patient will move from supine to sit and sit to supine, scoot up and down, and roll side to side in bed with modified independence within 7 day(s). 2.  Patient will perform sit to stand with supervision/set-up within 7 day(s). 3.  Patient will transfer from bed to chair and chair to bed with supervision/set-up using the least restrictive device within 7 day(s). 4.  Patient will ambulate with contact guard assist for 25 feet with the least restrictive device within 7 day(s). Outcome: Progressing   PHYSICAL THERAPY EVALUATION    Patient: Osei Arzate (9 y.o. 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 PmHx significant for CVA with R hemiparesis and expressive aphasia. Patient on 15L HF with Spo2 remaining >91% during activity. Based on the impairments listed above patient is below her baseline functional status. Communication limited at times due to expressive aphasia, patient with intermittent difficulty stating what she wants to say but other times can fluently communicate.  Completes supine>sit with HOB elevated with Min A and increased time Evaluation Charge Determination   History Examination Presentation Decision-Making   HIGH Complexity :3+ comorbidities / personal factors will impact the outcome/ POC  MEDIUM Complexity : 3 Standardized tests and measures addressin body structure, function, activity limitation and / or participation in recreation  MEDIUM Complexity : Evolving with changing characteristics  AM-PAC  HIGH    Based on the above components, the patient evaluation is determined to be of the following complexity level: Medium

## 2023-11-15 NOTE — PROGRESS NOTES
Bedside and Verbal shift change report given to Henry Wren RN (oncoming nurse) by Brooklynn Lucas RN (offgoing nurse). Report included the following information Nurse Handoff Report, Intake/Output, MAR, Recent Results, and Cardiac Rhythm SR . End of Shift Note    Bedside shift change report given to SHA Mulligan (oncoming nurse) by Aster Skelton RN (offgoing nurse). Report included the following information SBAR, Intake/Output, MAR, and Cardiac Rhythm SR    Shift worked:  7A-7P     Shift summary and any significant changes:     Worked with PT and OT today, transferred to chair with 1 assist. Patient stayed up in chair most of the day. Continue to wean O2, tolerating O2 at 11lpm HFNC. Concerns for physician to address:       Zone phone for oncoming shift:          Activity:     Number times ambulated in hallways past shift: 0  Number of times OOB to chair past shift: 1    Cardiac:   Cardiac Monitoring: Yes           Access:  Current line(s): PIV     Genitourinary:   Urinary status: external catheter    Respiratory:      Chronic home O2 use?: NO  Incentive spirometer at bedside: NO       GI:     Current diet:  ADULT DIET; Regular; Low Fat/Low Chol/High Fiber/JÚNIOR  Passing flatus: YES  Tolerating current diet: YES       Pain Management:   Patient states pain is manageable on current regimen: YES    Skin:     Interventions: float heels, increase time out of bed, PT/OT consult, limit briefs, and internal/external urinary devices, turning at least every 2 hours    Patient Safety:  Fall Score:    Interventions: bed/chair alarm, assistive device (walker, cane.  etc), gripper socks, pt to call before getting OOB, and stay with me (per policy)       Length of Stay:  Expected LOS: 3  Actual LOS: 1      Aster Skelton RN

## 2023-11-15 NOTE — PROGRESS NOTES
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  - No tobacco, etoh, no drugs  , lives in assisted living, uses a rollator  DNR     Daughter is [phone]                     Plan:        - No chest pain, increased hs troponin consistent with NSTEMI, at age 80 with her comorbidities poor candidate for invasive treatment, manage medically  - Increased pBNP with bilateral pleural effusions, will diurese  - Sinus  - ProCal low     Echo mod decreased EF     - Cont added aspirin  - cont metoprolol  - Holding amlodipine  - Cot added lasix 40mg iv bid, follow bmp  - Cont atorvastatin     Updated POA over phone            [x]       High complexity decision making was performed in this patient at high risk for decompensation with multiple organ involvement. We discussed the expected course, resolution and complications of the diagnoses in detail. Medication risk, benefits, costs, interactions, and alternatives were discussed as indicated. I advised him to contact the office if his condition worsens, changes or fails to improve as anticipated. Patient expressed understanding with the diagnoses  and plan. Subjective:     Kae Michael denies chest pain, dyspnea. Discussed with RN events overnight.      Review of Systems:    Symptom Y/N Comments  Symptom Y/N Comments   Fever/Chills N   Chest Pain N    Poor Appetite N   Edema N    Cough N   Abdominal Pain N    Sputum N   Joint Pain N    SOB/CHAUHAN Y   Pruritis/Rash N

## 2023-11-15 NOTE — PLAN OF CARE
Problem: Occupational Therapy - Adult  Goal: By Discharge: Performs self-care activities at highest level of function for planned discharge setting. See evaluation for individualized goals. Description: FUNCTIONAL STATUS PRIOR TO ADMISSION:    Receives Help From: Other (comment) ([facility] staff), ADL Assistance: Needs assistance,  ,  ,  ,  ,  ,  , Ambulation Assistance: Independent (mobilized independently in apartment, recliner/toilet with extra time and rollator; staff would supervise ambulation to/from dining room), Transfer Assistance: Independent,       HOME SUPPORT: Patient lived at [address] [place] [address], has assistance from staff for morning and evening ADLs, supervision in hallway for functional ambulation to/from dining room, but was able to mobilize independently within apartment and complete toileting tasks without assist.    Occupational Therapy Goals:  Initiated 11/15/2023  1. Patient will perform grooming in stance with SBA within 7 day(s). 2.  Patient will perform lower body dressing with Minimal Assist within 7 day(s). 3.  Patient will perform toileting with Stand by Assist within 7 day(s). 4.  Patient will perform toilet transfers with Stand by Assist  within 7 day(s). 5.  Patient will perform functional ambulation household distance with SBA and least restrictive AD within 7 day(s). Outcome: Progressing   OCCUPATIONAL THERAPY EVALUATION    Patient: Marcelo Marion (18 y.o. female)  Date: 11/15/2023  Primary Diagnosis: Resistant hypertension [I1A.0]  Acute hypoxemic respiratory failure (HCC) [J96.01]         Precautions: Isolation (droplet +)                  ASSESSMENT :  The patient is limited by decreased functional mobility, independence in ADLs, ROM, strength, activity tolerance, endurance, cognition, command following, attention/concentration, coordination, balance secondary to COVID, CHF, respiratory failure, and B pleural effusions. With hx significant for CVA with R hemiparesis.      Based on the

## 2023-11-16 LAB
ANION GAP SERPL CALC-SCNC: 9 MMOL/L (ref 5–15)
BACTERIA SPEC CULT: ABNORMAL
BUN SERPL-MCNC: 52 MG/DL (ref 6–20)
BUN/CREAT SERPL: 38 (ref 12–20)
CALCIUM SERPL-MCNC: 8.2 MG/DL (ref 8.5–10.1)
CC UR VC: ABNORMAL
CHLORIDE SERPL-SCNC: 103 MMOL/L (ref 97–108)
CO2 SERPL-SCNC: 24 MMOL/L (ref 21–32)
CREAT SERPL-MCNC: 1.37 MG/DL (ref 0.55–1.02)
ERYTHROCYTE [DISTWIDTH] IN BLOOD BY AUTOMATED COUNT: 14.1 % (ref 11.5–14.5)
GLUCOSE BLD STRIP.AUTO-MCNC: 154 MG/DL (ref 65–117)
GLUCOSE BLD STRIP.AUTO-MCNC: 158 MG/DL (ref 65–117)
GLUCOSE BLD STRIP.AUTO-MCNC: 278 MG/DL (ref 65–117)
GLUCOSE BLD STRIP.AUTO-MCNC: 300 MG/DL (ref 65–117)
GLUCOSE SERPL-MCNC: 106 MG/DL (ref 65–100)
HCT VFR BLD AUTO: 34 % (ref 35–47)
HGB BLD-MCNC: 10.8 G/DL (ref 11.5–16)
MCH RBC QN AUTO: 31.6 PG (ref 26–34)
MCHC RBC AUTO-ENTMCNC: 31.8 G/DL (ref 30–36.5)
MCV RBC AUTO: 99.4 FL (ref 80–99)
NRBC # BLD: 0 K/UL (ref 0–0.01)
NRBC BLD-RTO: 0 PER 100 WBC
NT PRO BNP: ABNORMAL PG/ML
PLATELET # BLD AUTO: 191 K/UL (ref 150–400)
PMV BLD AUTO: 11.3 FL (ref 8.9–12.9)
POTASSIUM SERPL-SCNC: 4.2 MMOL/L (ref 3.5–5.1)
RBC # BLD AUTO: 3.42 M/UL (ref 3.8–5.2)
SERVICE CMNT-IMP: ABNORMAL
SODIUM SERPL-SCNC: 136 MMOL/L (ref 136–145)
VANCOMYCIN SERPL-MCNC: 12.8 UG/ML
WBC # BLD AUTO: 5.8 K/UL (ref 3.6–11)

## 2023-11-16 PROCEDURE — 2580000003 HC RX 258: Performed by: NURSE PRACTITIONER

## 2023-11-16 PROCEDURE — 6370000000 HC RX 637 (ALT 250 FOR IP): Performed by: NURSE PRACTITIONER

## 2023-11-16 PROCEDURE — 85027 COMPLETE CBC AUTOMATED: CPT

## 2023-11-16 PROCEDURE — 2060000000 HC ICU INTERMEDIATE R&B

## 2023-11-16 PROCEDURE — 6370000000 HC RX 637 (ALT 250 FOR IP): Performed by: STUDENT IN AN ORGANIZED HEALTH CARE EDUCATION/TRAINING PROGRAM

## 2023-11-16 PROCEDURE — 2700000000 HC OXYGEN THERAPY PER DAY

## 2023-11-16 PROCEDURE — 82962 GLUCOSE BLOOD TEST: CPT

## 2023-11-16 PROCEDURE — A4216 STERILE WATER/SALINE, 10 ML: HCPCS | Performed by: NURSE PRACTITIONER

## 2023-11-16 PROCEDURE — 80202 ASSAY OF VANCOMYCIN: CPT

## 2023-11-16 PROCEDURE — 80048 BASIC METABOLIC PNL TOTAL CA: CPT

## 2023-11-16 PROCEDURE — 94761 N-INVAS EAR/PLS OXIMETRY MLT: CPT

## 2023-11-16 PROCEDURE — 2580000003 HC RX 258: Performed by: STUDENT IN AN ORGANIZED HEALTH CARE EDUCATION/TRAINING PROGRAM

## 2023-11-16 PROCEDURE — 6360000002 HC RX W HCPCS: Performed by: NURSE PRACTITIONER

## 2023-11-16 PROCEDURE — C9113 INJ PANTOPRAZOLE SODIUM, VIA: HCPCS | Performed by: NURSE PRACTITIONER

## 2023-11-16 PROCEDURE — 83880 ASSAY OF NATRIURETIC PEPTIDE: CPT

## 2023-11-16 PROCEDURE — 36415 COLL VENOUS BLD VENIPUNCTURE: CPT

## 2023-11-16 PROCEDURE — 6360000002 HC RX W HCPCS: Performed by: STUDENT IN AN ORGANIZED HEALTH CARE EDUCATION/TRAINING PROGRAM

## 2023-11-16 RX ADMIN — VANCOMYCIN HYDROCHLORIDE 500 MG: 500 INJECTION, POWDER, LYOPHILIZED, FOR SOLUTION INTRAVENOUS at 09:29

## 2023-11-16 RX ADMIN — METOPROLOL SUCCINATE 25 MG: 25 TABLET, EXTENDED RELEASE ORAL at 08:53

## 2023-11-16 RX ADMIN — INSULIN LISPRO 4 UNITS: 100 INJECTION, SOLUTION INTRAVENOUS; SUBCUTANEOUS at 12:28

## 2023-11-16 RX ADMIN — INSULIN LISPRO 4 UNITS: 100 INJECTION, SOLUTION INTRAVENOUS; SUBCUTANEOUS at 21:07

## 2023-11-16 RX ADMIN — Medication: at 08:53

## 2023-11-16 RX ADMIN — CEFEPIME 1000 MG: 1 INJECTION, POWDER, FOR SOLUTION INTRAMUSCULAR; INTRAVENOUS at 15:53

## 2023-11-16 RX ADMIN — SODIUM CHLORIDE, PRESERVATIVE FREE 10 ML: 5 INJECTION INTRAVENOUS at 21:08

## 2023-11-16 RX ADMIN — ATORVASTATIN CALCIUM 40 MG: 40 TABLET, FILM COATED ORAL at 21:07

## 2023-11-16 RX ADMIN — AZITHROMYCIN MONOHYDRATE 500 MG: 500 INJECTION, POWDER, LYOPHILIZED, FOR SOLUTION INTRAVENOUS at 08:05

## 2023-11-16 RX ADMIN — SODIUM CHLORIDE, PRESERVATIVE FREE 40 MG: 5 INJECTION INTRAVENOUS at 08:52

## 2023-11-16 RX ADMIN — HEPARIN SODIUM 5000 UNITS: 5000 INJECTION INTRAVENOUS; SUBCUTANEOUS at 10:07

## 2023-11-16 RX ADMIN — Medication: at 21:07

## 2023-11-16 RX ADMIN — ASPIRIN 81 MG: 81 TABLET, CHEWABLE ORAL at 08:52

## 2023-11-16 RX ADMIN — CEFEPIME 1000 MG: 1 INJECTION, POWDER, FOR SOLUTION INTRAMUSCULAR; INTRAVENOUS at 03:42

## 2023-11-16 RX ADMIN — DEXAMETHASONE SODIUM PHOSPHATE 6 MG: 10 INJECTION, SOLUTION INTRAMUSCULAR; INTRAVENOUS at 05:14

## 2023-11-16 RX ADMIN — FUROSEMIDE 40 MG: 10 INJECTION, SOLUTION INTRAMUSCULAR; INTRAVENOUS at 08:52

## 2023-11-16 RX ADMIN — FUROSEMIDE 40 MG: 10 INJECTION, SOLUTION INTRAMUSCULAR; INTRAVENOUS at 16:55

## 2023-11-16 RX ADMIN — HEPARIN SODIUM 5000 UNITS: 5000 INJECTION INTRAVENOUS; SUBCUTANEOUS at 01:55

## 2023-11-16 RX ADMIN — HEPARIN SODIUM 5000 UNITS: 5000 INJECTION INTRAVENOUS; SUBCUTANEOUS at 17:45

## 2023-11-16 RX ADMIN — SODIUM CHLORIDE, PRESERVATIVE FREE 10 ML: 5 INJECTION INTRAVENOUS at 08:53

## 2023-11-16 RX ADMIN — BARICITINIB 2 MG: 2 TABLET, FILM COATED ORAL at 10:07

## 2023-11-16 ASSESSMENT — PAIN SCALES - GENERAL
PAINLEVEL_OUTOF10: 0

## 2023-11-16 NOTE — PROGRESS NOTES
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  - No tobacco, etoh, no drugs  , lives in assisted living, uses a rollator  DNR     Daughter is [phone]                     Plan:        - No chest pain, increased hs troponin consistent with NSTEMI, at age 80 with her comorbidities poor candidate for invasive treatment, manage medically  - Increased pBNP with bilateral pleural effusions, will diurese  - Sinus  - ProCal low     Echo mod decreased EF     - Cont added aspirin  - cont metoprolol  - Holding amlodipine  - Cot added lasix 40mg iv bid, follow bmp, O2 down to 9L  - Cont atorvastatin               [x]       High complexity decision making was performed in this patient at high risk for decompensation with multiple organ involvement. We discussed the expected course, resolution and complications of the diagnoses in detail. Medication risk, benefits, costs, interactions, and alternatives were discussed as indicated. I advised him to contact the office if his condition worsens, changes or fails to improve as anticipated. Patient expressed understanding with the diagnoses  and plan. Subjective:     Erich Shields denies chest pain, dyspnea. Discussed with RN events overnight.      Review of Systems:    Symptom Y/N Comments  Symptom Y/N Comments   Fever/Chills N   Chest Pain N    Poor Appetite N   Edema N    Cough N   Abdominal Pain N    Sputum N   Joint Pain N    SOB/CHAUHAN Y   Pruritis/Rash N 3 times per day    vancomycin (VANCOCIN) 500 mg in sodium chloride 0.9 % 100 mL IVPB (mini-bag)  500 mg IntraVENous Q24H    baricitinib (OLUMIANT) tablet 2 mg  2 mg Oral Daily    insulin lispro (HUMALOG) injection vial 0-8 Units  0-8 Units SubCUTAneous TID WC    insulin lispro (HUMALOG) injection vial 0-4 Units  0-4 Units SubCUTAneous Nightly    glucose chewable tablet 16 g  4 tablet Oral PRN    dextrose bolus 10% 125 mL  125 mL IntraVENous PRN    Or    dextrose bolus 10% 250 mL  250 mL IntraVENous PRN    glucagon (rDNA) injection 1 mg  1 mg SubCUTAneous PRN    dextrose 10 % infusion   IntraVENous Continuous PRN    Vancomycin Level   Other RX Placeholder    dexamethasone (PF) (DECADRON) injection 6 mg  6 mg IntraVENous Q24H    pantoprazole (PROTONIX) 40 mg in sodium chloride (PF) 0.9 % 10 mL injection  40 mg IntraVENous Daily    aspirin chewable tablet 81 mg  81 mg Oral Daily    azithromycin (ZITHROMAX) 500 mg in sodium chloride 0.9 % 250 mL IVPB ([id number])  500 mg IntraVENous Q24H    metoprolol succinate (TOPROL XL) extended release tablet 25 mg  25 mg Oral Daily    atorvastatin (LIPITOR) tablet 40 mg  40 mg Oral Nightly    sodium chloride flush 0.9 % injection 5-40 mL  5-40 mL IntraVENous 2 times per day    sodium chloride flush 0.9 % injection 5-40 mL  5-40 mL IntraVENous PRN    0.9 % sodium chloride infusion   IntraVENous PRN    ondansetron (ZOFRAN-ODT) disintegrating tablet 4 mg  4 mg Oral Q8H PRN    Or    ondansetron (ZOFRAN) injection 4 mg  4 mg IntraVENous Q6H PRN    polyethylene glycol (GLYCOLAX) packet 17 g  17 g Oral Daily PRN    acetaminophen (TYLENOL) tablet 650 mg  650 mg Oral Q6H PRN    Or    acetaminophen (TYLENOL) suppository 650 mg  650 mg Rectal Q6H PRN    ipratropium 0.5 mg-albuterol 2.5 mg (DUONEB) nebulizer solution 1 Dose  1 Dose Inhalation Q4H PRN    vancomycin (VANCOCIN) intermittent dosing (placeholder)   Other RX Placeholder    cefepime (MAXIPIME) 1,000 mg in sodium chloride 0.9 % 50 mL

## 2023-11-16 NOTE — PROGRESS NOTES
Verbal shift change report given to [address] [place], [place] [address]way [address] (oncoming nurse) by Sophia Dodge RN (offgoing nurse). Report included the following information Nurse Handoff Report, Intake/Output, MAR, Recent Results, and Cardiac Rhythm SR . End of Shift Note    Bedside shift change report given to SHA Mulligan (oncoming nurse) by Torsten Cates RN (offgoing nurse). Report included the following information SBAR, Intake/Output, MAR, Recent Results, and Cardiac Rhythm SB    Shift worked:  7A-7P     Shift summary and any significant changes:    Continue to wean O2, now at 4lpm via nasal cannula, tolerating well. Stayed up in chair most of the day. Concerns for physician to address:       Zone phone for oncoming shift:          Activity:     Number times ambulated in hallways past shift: 0  Number of times OOB to chair past shift: 2    Cardiac:   Cardiac Monitoring: Yes           Access:  Current line(s): PIV     Genitourinary:   Urinary status: incontinent and external catheter    Respiratory:      Chronic home O2 use?: NO  Incentive spirometer at bedside: NO       GI:     Current diet:  ADULT DIET; Regular; Low Fat/Low Chol/High Fiber/JÚNIOR  Passing flatus: YES  Tolerating current diet: YES       Pain Management:   Patient states pain is manageable on current regimen: YES    Skin:     Interventions: float heels, increase time out of bed, and internal/external urinary devices, turning    Patient Safety:  Fall Score:    Interventions: bed/chair alarm, assistive device (walker, cane.  etc), gripper socks, pt to call before getting OOB, and stay with me (per policy)       Length of Stay:  Expected LOS: 3  Actual LOS: 2      Torsten Cates RN

## 2023-11-16 NOTE — PROGRESS NOTES
all procedures/Xrays and details which were not copied into this note but were reviewed prior to creation of Plan. LABS:  I reviewed today's most current labs and imaging studies.   Pertinent labs include:  Recent Labs     11/14/23  0441 11/15/23  0405 11/16/23  0403   WBC 6.9 8.7 5.8   HGB 10.1* 10.6* 10.8*   HCT 32.8* 33.9* 34.0*    187 191       Recent Labs     11/14/23  0259 11/14/23  0441 11/15/23  0405 11/16/23  0403   *  --  138 136   K 4.1  --  3.8 4.2     --  104 103   CO2 26  --  29 24   GLUCOSE 186*  --  118* 106*   BUN 48*  --  45* 52*   CREATININE 1.38*  --  1.35* 1.37*   CALCIUM 8.2*  --  8.5 8.2*   LABALBU 3.2*  --   --   --    BILITOT 0.5  --   --   --    AST 49*  --   --   --    ALT 29  --   --   --    INR  --  1.1  --   --          Signed: Julian Sen MD

## 2023-11-16 NOTE — PROGRESS NOTES
End of Shift Note    Bedside shift change report given to Jody Pérez RN (oncoming nurse) by Giancarlo Bueno RN (offgoing nurse). Report included the following information SR    Shift worked:  7P-7A     Shift summary and any significant changes:     N/A     Concerns for physician to address:  N/A     Zone phone for oncoming shift:   [phone]         Activity:     Number times ambulated in hallways past shift: 0  Number of times OOB to chair past shift: 2    Cardiac:   Cardiac Monitoring: Yes           Access:  Current line(s): PICC     Genitourinary:   Urinary status: voiding and external catheter    Respiratory:      Chronic home O2 use?: YES  Incentive spirometer at bedside: YES       GI:     Current diet:  ADULT DIET; Regular; Low Fat/Low Chol/High Fiber/JÚNIOR  Passing flatus: YES  Tolerating current diet: YES       Pain Management:   Patient states pain is manageable on current regimen: YES    Skin:     Interventions: specialty bed, float heels, and increase time out of bed    Patient Safety:  Fall Score:    Interventions: assistive device (walker, cane.  etc), gripper socks, and pt to call before getting OOB       Length of Stay:  Expected LOS: 3  Actual LOS: 2      Giancarlo Bueno RN

## 2023-11-17 LAB
ANION GAP SERPL CALC-SCNC: 5 MMOL/L (ref 5–15)
BUN SERPL-MCNC: 59 MG/DL (ref 6–20)
BUN/CREAT SERPL: 42 (ref 12–20)
CALCIUM SERPL-MCNC: 8.3 MG/DL (ref 8.5–10.1)
CHLORIDE SERPL-SCNC: 100 MMOL/L (ref 97–108)
CO2 SERPL-SCNC: 32 MMOL/L (ref 21–32)
CREAT SERPL-MCNC: 1.4 MG/DL (ref 0.55–1.02)
CRP SERPL-MCNC: 5.27 MG/DL (ref 0–0.6)
ERYTHROCYTE [DISTWIDTH] IN BLOOD BY AUTOMATED COUNT: 13.8 % (ref 11.5–14.5)
GLUCOSE BLD STRIP.AUTO-MCNC: 109 MG/DL (ref 65–117)
GLUCOSE BLD STRIP.AUTO-MCNC: 147 MG/DL (ref 65–117)
GLUCOSE BLD STRIP.AUTO-MCNC: 208 MG/DL (ref 65–117)
GLUCOSE BLD STRIP.AUTO-MCNC: 259 MG/DL (ref 65–117)
GLUCOSE SERPL-MCNC: 117 MG/DL (ref 65–100)
HCT VFR BLD AUTO: 32.6 % (ref 35–47)
HGB BLD-MCNC: 10.7 G/DL (ref 11.5–16)
MCH RBC QN AUTO: 32.2 PG (ref 26–34)
MCHC RBC AUTO-ENTMCNC: 32.8 G/DL (ref 30–36.5)
MCV RBC AUTO: 98.2 FL (ref 80–99)
NRBC # BLD: 0 K/UL (ref 0–0.01)
NRBC BLD-RTO: 0 PER 100 WBC
PLATELET # BLD AUTO: 197 K/UL (ref 150–400)
PMV BLD AUTO: 10.9 FL (ref 8.9–12.9)
POTASSIUM SERPL-SCNC: 3.8 MMOL/L (ref 3.5–5.1)
RBC # BLD AUTO: 3.32 M/UL (ref 3.8–5.2)
SERVICE CMNT-IMP: ABNORMAL
SERVICE CMNT-IMP: NORMAL
SODIUM SERPL-SCNC: 137 MMOL/L (ref 136–145)
WBC # BLD AUTO: 5.7 K/UL (ref 3.6–11)

## 2023-11-17 PROCEDURE — 2580000003 HC RX 258: Performed by: NURSE PRACTITIONER

## 2023-11-17 PROCEDURE — 2060000000 HC ICU INTERMEDIATE R&B

## 2023-11-17 PROCEDURE — 6360000002 HC RX W HCPCS: Performed by: STUDENT IN AN ORGANIZED HEALTH CARE EDUCATION/TRAINING PROGRAM

## 2023-11-17 PROCEDURE — 6370000000 HC RX 637 (ALT 250 FOR IP): Performed by: STUDENT IN AN ORGANIZED HEALTH CARE EDUCATION/TRAINING PROGRAM

## 2023-11-17 PROCEDURE — C9113 INJ PANTOPRAZOLE SODIUM, VIA: HCPCS | Performed by: NURSE PRACTITIONER

## 2023-11-17 PROCEDURE — 2580000003 HC RX 258: Performed by: STUDENT IN AN ORGANIZED HEALTH CARE EDUCATION/TRAINING PROGRAM

## 2023-11-17 PROCEDURE — 85027 COMPLETE CBC AUTOMATED: CPT

## 2023-11-17 PROCEDURE — 6360000002 HC RX W HCPCS: Performed by: NURSE PRACTITIONER

## 2023-11-17 PROCEDURE — 36415 COLL VENOUS BLD VENIPUNCTURE: CPT

## 2023-11-17 PROCEDURE — 80048 BASIC METABOLIC PNL TOTAL CA: CPT

## 2023-11-17 PROCEDURE — 6370000000 HC RX 637 (ALT 250 FOR IP): Performed by: NURSE PRACTITIONER

## 2023-11-17 PROCEDURE — 82962 GLUCOSE BLOOD TEST: CPT

## 2023-11-17 PROCEDURE — 86140 C-REACTIVE PROTEIN: CPT

## 2023-11-17 PROCEDURE — 2700000000 HC OXYGEN THERAPY PER DAY

## 2023-11-17 RX ADMIN — CEFEPIME 1000 MG: 1 INJECTION, POWDER, FOR SOLUTION INTRAMUSCULAR; INTRAVENOUS at 04:05

## 2023-11-17 RX ADMIN — FUROSEMIDE 40 MG: 10 INJECTION, SOLUTION INTRAMUSCULAR; INTRAVENOUS at 09:26

## 2023-11-17 RX ADMIN — ATORVASTATIN CALCIUM 40 MG: 40 TABLET, FILM COATED ORAL at 21:58

## 2023-11-17 RX ADMIN — HEPARIN SODIUM 5000 UNITS: 5000 INJECTION INTRAVENOUS; SUBCUTANEOUS at 18:39

## 2023-11-17 RX ADMIN — Medication: at 09:28

## 2023-11-17 RX ADMIN — CEFEPIME 1000 MG: 1 INJECTION, POWDER, FOR SOLUTION INTRAMUSCULAR; INTRAVENOUS at 16:49

## 2023-11-17 RX ADMIN — VANCOMYCIN HYDROCHLORIDE 500 MG: 500 INJECTION, POWDER, LYOPHILIZED, FOR SOLUTION INTRAVENOUS at 09:49

## 2023-11-17 RX ADMIN — HEPARIN SODIUM 5000 UNITS: 5000 INJECTION INTRAVENOUS; SUBCUTANEOUS at 01:25

## 2023-11-17 RX ADMIN — SODIUM CHLORIDE, PRESERVATIVE FREE 10 ML: 5 INJECTION INTRAVENOUS at 21:59

## 2023-11-17 RX ADMIN — INSULIN LISPRO 2 UNITS: 100 INJECTION, SOLUTION INTRAVENOUS; SUBCUTANEOUS at 12:56

## 2023-11-17 RX ADMIN — FUROSEMIDE 40 MG: 10 INJECTION, SOLUTION INTRAMUSCULAR; INTRAVENOUS at 18:39

## 2023-11-17 RX ADMIN — DEXAMETHASONE SODIUM PHOSPHATE 6 MG: 10 INJECTION, SOLUTION INTRAMUSCULAR; INTRAVENOUS at 04:06

## 2023-11-17 RX ADMIN — SODIUM CHLORIDE, PRESERVATIVE FREE 40 MG: 5 INJECTION INTRAVENOUS at 09:22

## 2023-11-17 RX ADMIN — BARICITINIB 2 MG: 2 TABLET, FILM COATED ORAL at 10:10

## 2023-11-17 RX ADMIN — Medication: at 21:58

## 2023-11-17 RX ADMIN — HEPARIN SODIUM 5000 UNITS: 5000 INJECTION INTRAVENOUS; SUBCUTANEOUS at 09:19

## 2023-11-17 RX ADMIN — METOPROLOL SUCCINATE 25 MG: 25 TABLET, EXTENDED RELEASE ORAL at 09:20

## 2023-11-17 RX ADMIN — SODIUM CHLORIDE, PRESERVATIVE FREE 10 ML: 5 INJECTION INTRAVENOUS at 09:27

## 2023-11-17 RX ADMIN — AZITHROMYCIN MONOHYDRATE 500 MG: 500 INJECTION, POWDER, LYOPHILIZED, FOR SOLUTION INTRAVENOUS at 09:17

## 2023-11-17 RX ADMIN — ASPIRIN 81 MG: 81 TABLET, CHEWABLE ORAL at 09:20

## 2023-11-17 ASSESSMENT — PAIN SCALES - GENERAL
PAINLEVEL_OUTOF10: 0

## 2023-11-17 NOTE — PROGRESS NOTES
0700. Bedside shift change report given to [address] Day (oncoming nurse) by Jame Love RN (offgoing nurse). Report included the following information Nurse Handoff Report, Index, Intake/Output, MAR, Recent Results, and Cardiac Rhythm Sinus Judith Friends . Patient weaned throughout the day to RA, tolerating well. MD Aware    1900. Bedside shift change report given to [address]isha (oncoming nurse) by Felipe Larose RN (offgoing nurse). Report included the following information Nurse Handoff Report, Index, Intake/Output, MAR, Recent Results, and Cardiac Rhythm NSR .

## 2023-11-17 NOTE — PROGRESS NOTES
End of Shift Note    Bedside shift change report given to Shreya Chavira RN (oncoming nurse) by Hannah Ta RN (offgoing nurse). Report included the following information SBAR, Kardex, Intake/Output, MAR, Recent Results, and Cardiac Rhythm SR    Shift worked:  7p-7a     Shift summary and any significant changes:     Oxygen decrease from 11L to 4L, no distress noted. Concerns for physician to address:        Zone phone for oncoming shift:   [phone]       Activity:     Number times ambulated in hallways past shift: 0  Number of times OOB to chair past shift: 1    Cardiac:   Cardiac Monitoring: Yes           Access:  Current line(s): PICC     Genitourinary:   Urinary status: voiding and external catheter    Respiratory:      Chronic home O2 use?: YES  Incentive spirometer at bedside: YES       GI:     Current diet:  ADULT DIET; Regular; Low Fat/Low Chol/High Fiber/JÚNIOR  Passing flatus: YES  Tolerating current diet:    YES    Pain Management:   Patient states pain is manageable on current regimen:     Skin:     Interventions: float heels, increase time out of bed, foam dressing, and internal/external urinary devices    Patient Safety:  Fall Score:    Interventions: bed/chair alarm, assistive device (walker, cane.  etc), gripper socks, and pt to call before getting OOB       Length of Stay:  Expected LOS: 3  Actual LOS: 3      Hannah Ta RN

## 2023-11-17 NOTE — PROGRESS NOTES
Physical Therapy:  Attempted to see patient for PT treatment. Patient was sitting up in the chair, adamantly expressed NO. She was unable to articulate or express why, but again said no and shook her head. Will defer for now, but continue to follow.   Elizabeth Damon PT, DPT well developed, well nourished , in no acute distress , ambulating without difficulty , normal communication ability

## 2023-11-17 NOTE — PROGRESS NOTES
Occupational Therapy  Attempted to see patient for OT treatment. Patient was sitting up in the chair, adamantly expressed NO. She was unable to articulate or express why, but again said no and shook her head. Will defer for now, but continue to follow.

## 2023-11-17 NOTE — CARE COORDINATION
Return to [facility]. Transition of Care Plan:    RUR: 17% \"low risk\"  Prior Level of Functioning: Independent with ADL's  Disposition: Return to [facility]  Follow up appointments: To be arranged by assisted  DME needed: Defer to assisted  Transportation at discharge: BLS transport  IM/IMM Medicare/ letter given: 2nd IM letter needed at d/c  Is patient a [employer] and connected with [facility]? N/A  Caregiver Contact: Pt's daughter Alivia Willson [phone]  Discharge Caregiver contacted prior to discharge? To be notified  Care Conference needed? Not at this time  Barriers to discharge:  Clinical improvement    Initial note: Chart reviewed for updates. It was reported in IDR's that pt will d/c tomorrow. CM contacted [facility] [phone] to follow up on acceptance over the weekend. CM was notified that they can accept patient over the weekend and that pt can return with COVID. MD notified via perfect serve. CM will continue to follow up with d/c planning.     OSMAR Phillips    [phone]

## 2023-11-18 VITALS
HEART RATE: 79 BPM | WEIGHT: 163.8 LBS | SYSTOLIC BLOOD PRESSURE: 137 MMHG | OXYGEN SATURATION: 88 % | DIASTOLIC BLOOD PRESSURE: 77 MMHG | BODY MASS INDEX: 30.93 KG/M2 | TEMPERATURE: 97.4 F | RESPIRATION RATE: 17 BRPM | HEIGHT: 61 IN

## 2023-11-18 LAB
ANION GAP SERPL CALC-SCNC: 6 MMOL/L (ref 5–15)
BASOPHILS # BLD: 0 K/UL (ref 0–0.1)
BASOPHILS NFR BLD: 1 % (ref 0–1)
BUN SERPL-MCNC: 52 MG/DL (ref 6–20)
BUN/CREAT SERPL: 37 (ref 12–20)
CALCIUM SERPL-MCNC: 8.7 MG/DL (ref 8.5–10.1)
CHLORIDE SERPL-SCNC: 98 MMOL/L (ref 97–108)
CO2 SERPL-SCNC: 33 MMOL/L (ref 21–32)
CREAT SERPL-MCNC: 1.39 MG/DL (ref 0.55–1.02)
DIFFERENTIAL METHOD BLD: ABNORMAL
EOSINOPHIL # BLD: 0 K/UL (ref 0–0.4)
EOSINOPHIL NFR BLD: 0 % (ref 0–7)
ERYTHROCYTE [DISTWIDTH] IN BLOOD BY AUTOMATED COUNT: 13.7 % (ref 11.5–14.5)
GLUCOSE BLD STRIP.AUTO-MCNC: 117 MG/DL (ref 65–117)
GLUCOSE BLD STRIP.AUTO-MCNC: 133 MG/DL (ref 65–117)
GLUCOSE SERPL-MCNC: 118 MG/DL (ref 65–100)
HCT VFR BLD AUTO: 36.3 % (ref 35–47)
HGB BLD-MCNC: 11.5 G/DL (ref 11.5–16)
IMM GRANULOCYTES # BLD AUTO: 0 K/UL (ref 0–0.04)
IMM GRANULOCYTES NFR BLD AUTO: 1 % (ref 0–0.5)
LYMPHOCYTES # BLD: 1.4 K/UL (ref 0.8–3.5)
LYMPHOCYTES NFR BLD: 23 % (ref 12–49)
MCH RBC QN AUTO: 30.7 PG (ref 26–34)
MCHC RBC AUTO-ENTMCNC: 31.7 G/DL (ref 30–36.5)
MCV RBC AUTO: 97.1 FL (ref 80–99)
MONOCYTES # BLD: 0.7 K/UL (ref 0–1)
MONOCYTES NFR BLD: 11 % (ref 5–13)
NEUTS SEG # BLD: 4 K/UL (ref 1.8–8)
NEUTS SEG NFR BLD: 64 % (ref 32–75)
NRBC # BLD: 0 K/UL (ref 0–0.01)
NRBC BLD-RTO: 0 PER 100 WBC
PLATELET # BLD AUTO: 224 K/UL (ref 150–400)
PMV BLD AUTO: 11 FL (ref 8.9–12.9)
POTASSIUM SERPL-SCNC: 3.7 MMOL/L (ref 3.5–5.1)
RBC # BLD AUTO: 3.74 M/UL (ref 3.8–5.2)
SERVICE CMNT-IMP: ABNORMAL
SERVICE CMNT-IMP: NORMAL
SODIUM SERPL-SCNC: 137 MMOL/L (ref 136–145)
WBC # BLD AUTO: 6.1 K/UL (ref 3.6–11)

## 2023-11-18 PROCEDURE — C9113 INJ PANTOPRAZOLE SODIUM, VIA: HCPCS | Performed by: NURSE PRACTITIONER

## 2023-11-18 PROCEDURE — 36415 COLL VENOUS BLD VENIPUNCTURE: CPT

## 2023-11-18 PROCEDURE — 6370000000 HC RX 637 (ALT 250 FOR IP): Performed by: STUDENT IN AN ORGANIZED HEALTH CARE EDUCATION/TRAINING PROGRAM

## 2023-11-18 PROCEDURE — 6370000000 HC RX 637 (ALT 250 FOR IP): Performed by: NURSE PRACTITIONER

## 2023-11-18 PROCEDURE — 82962 GLUCOSE BLOOD TEST: CPT

## 2023-11-18 PROCEDURE — 85025 COMPLETE CBC W/AUTO DIFF WBC: CPT

## 2023-11-18 PROCEDURE — 80048 BASIC METABOLIC PNL TOTAL CA: CPT

## 2023-11-18 PROCEDURE — 6360000002 HC RX W HCPCS: Performed by: NURSE PRACTITIONER

## 2023-11-18 PROCEDURE — 2580000003 HC RX 258: Performed by: NURSE PRACTITIONER

## 2023-11-18 PROCEDURE — 6360000002 HC RX W HCPCS: Performed by: STUDENT IN AN ORGANIZED HEALTH CARE EDUCATION/TRAINING PROGRAM

## 2023-11-18 PROCEDURE — 2580000003 HC RX 258: Performed by: STUDENT IN AN ORGANIZED HEALTH CARE EDUCATION/TRAINING PROGRAM

## 2023-11-18 RX ORDER — ASPIRIN 81 MG/1
81 TABLET, CHEWABLE ORAL DAILY
Qty: 30 TABLET | Refills: 3 | Status: SHIPPED | OUTPATIENT
Start: 2023-11-19

## 2023-11-18 RX ORDER — FUROSEMIDE 40 MG/1
20 TABLET ORAL DAILY
Qty: 3 TABLET | Refills: 0 | Status: SHIPPED | OUTPATIENT
Start: 2023-11-18 | End: 2023-11-24

## 2023-11-18 RX ADMIN — Medication: at 09:15

## 2023-11-18 RX ADMIN — CEFEPIME 1000 MG: 1 INJECTION, POWDER, FOR SOLUTION INTRAMUSCULAR; INTRAVENOUS at 16:24

## 2023-11-18 RX ADMIN — AZITHROMYCIN MONOHYDRATE 500 MG: 500 INJECTION, POWDER, LYOPHILIZED, FOR SOLUTION INTRAVENOUS at 09:12

## 2023-11-18 RX ADMIN — FUROSEMIDE 40 MG: 10 INJECTION, SOLUTION INTRAMUSCULAR; INTRAVENOUS at 09:15

## 2023-11-18 RX ADMIN — ASPIRIN 81 MG: 81 TABLET, CHEWABLE ORAL at 09:15

## 2023-11-18 RX ADMIN — CEFEPIME 1000 MG: 1 INJECTION, POWDER, FOR SOLUTION INTRAMUSCULAR; INTRAVENOUS at 04:11

## 2023-11-18 RX ADMIN — DEXAMETHASONE SODIUM PHOSPHATE 6 MG: 10 INJECTION, SOLUTION INTRAMUSCULAR; INTRAVENOUS at 04:11

## 2023-11-18 RX ADMIN — BARICITINIB 2 MG: 2 TABLET, FILM COATED ORAL at 09:15

## 2023-11-18 RX ADMIN — HEPARIN SODIUM 5000 UNITS: 5000 INJECTION INTRAVENOUS; SUBCUTANEOUS at 09:16

## 2023-11-18 RX ADMIN — SODIUM CHLORIDE, PRESERVATIVE FREE 10 ML: 5 INJECTION INTRAVENOUS at 09:16

## 2023-11-18 RX ADMIN — SODIUM CHLORIDE, PRESERVATIVE FREE 40 MG: 5 INJECTION INTRAVENOUS at 09:14

## 2023-11-18 RX ADMIN — HEPARIN SODIUM 5000 UNITS: 5000 INJECTION INTRAVENOUS; SUBCUTANEOUS at 01:48

## 2023-11-18 RX ADMIN — METOPROLOL SUCCINATE 25 MG: 25 TABLET, EXTENDED RELEASE ORAL at 09:15

## 2023-11-18 ASSESSMENT — PAIN SCALES - GENERAL: PAINLEVEL_OUTOF10: 0

## 2023-11-18 NOTE — CARE COORDINATION
setup for next available @ 5:00-5:30PM. CM notified MD, nursing, and [facility]. CM reviewed pt's chart - open with TINO PARSON prior to admission. CM faxed [address] Orders for NORCAP LODGE PT/OT ([phone]) with therapy notes. CM faxed d/c summary, MAR, and updated notes to [facility] ([phone]) as requested. 4:00PM UPDATE  CM contacted pt's daughter Lyle Be, [phone]) to notify of d/c transportation for 5:00-5:30PM. Daughter had several questions re: COVID-19+ universal precautions (how to protect selves when visiting patient), transportation, etc. Daughter expressed concern that pt will not have rollator that is in her apartment. CM advised pt will be discharged to her apartment via stretcher transport. CM advised daughter that primary CM spoke with [facility] staff yesterday and CM today also spoke with [facility] who confirmed they are expecting pt to return today and requested records be faxed over. All information entered into pt AVS.     Pt has no additional CM needs at this time. 11/18/23 1640   Services At/After Discharge   Transition of Care Consult (CM Consult) Home Health;Assisted Living;Transportation Assistance  ([address] [place] [address], [address] [place] [address] PT/OT)   Internal Home Health No   Reason Outside [address] [place] [address] Drive Patient already serviced by other home [address] [place] [address] Discharge Assisted living;Home Health;PT;OT;In ambulance;Transport  ([address] [place] [address], [address] [place] [address] PT/OT)   Mode of Transport at Discharge [facility] Transport Time of Discharge 1700   Confirm Follow Up Transport Family   Condition of Participation: Discharge Planning   The Plan for Transition of Care is related to the following treatment goals: [address] [place] [address], [address] [place] [address] PT/OT, Follow-up Appointments   The Patient and/or Patient Representative was provided with a Choice of Provider? Patient Representative; Patient   Name of the Patient Representative who was provided with the Choice of Provider and agrees with the Discharge Plan? Chris Deal   The Patient and/Or Patient Representative agree with the Discharge Plan? Yes   Freedom of Choice list was provided with basic dialogue that supports the patient's individualized plan of care/goals, treatment preferences, and shares the quality data associated with the providers?   Yes     Dariana Luna, [address] [address] Manager  [phone]

## 2023-11-18 NOTE — PROGRESS NOTES
End of Shift Note    Bedside shift change report given to Hailey Pennington RN (oncoming nurse) by Jhon Cooley RN (offgoing nurse). Report included the following information SBAR, Kardex, Intake/Output, MAR, Recent Results, and Cardiac Rhythm SR    Shift worked:  7p-7a     Shift summary and any significant changes:    No oxygen in use      Concerns for physician to address:        Zone phone for oncoming shift:   [phone]       Activity:     Number times ambulated in hallways past shift: 0  Number of times OOB to chair past shift: 1    Cardiac:   Cardiac Monitoring: Yes           Access:  Current line(s): PICC     Genitourinary:   Urinary status: voiding and external catheter    Respiratory:      Chronic home O2 use?: YES  Incentive spirometer at bedside: YES       GI:     Current diet:  ADULT DIET; Regular; Low Fat/Low Chol/High Fiber/JÚNIOR  Passing flatus: YES  Tolerating current diet:    YES    Pain Management:   Patient states pain is manageable on current regimen:     Skin:     Interventions: float heels, increase time out of bed, foam dressing, and internal/external urinary devices    Patient Safety:  Fall Score:    Interventions: bed/chair alarm, assistive device (walker, cane.  etc), gripper socks, and pt to call before getting OOB       Length of Stay:  Expected LOS: 3  Actual LOS: [place], RN

## 2023-11-18 NOTE — DISCHARGE INSTRUCTIONS
All of your medications from before your hospitalization are the same EXCEPT:  Your new prescription for you to start is lasix for your breathing and aspirin for your heart protection. Please take all of your medications as prescribed. If prescribed any medications, please read all pharmacy instructions and inserts. Inform your doctor and pharmacist about all other medications and alternative therapies. Please follow up with your PCP in 1-2 weeks to be reassessed after your hospital stay. If you start feeling any symptoms similar to what brought you into the hospital, please come back to the ED to be re-evaluated.

## 2023-11-18 NOTE — PROGRESS NOTES
0700. Bedside shift change report given to [address] Day (oncoming nurse) by Fredi Cabot RN (offgoing nurse). Report included the following information Nurse Handoff Report, Index, Intake/Output, MAR, Recent Results, and Cardiac Rhythm NSR . Patient sats on room air 90s with drops to high 80s.     1645 Patient agreeable to discharge. All belongings on bed ready for discharge. Both facility and family aware of discharge.     1720. Patient discharged with Hospital to home to [facility].

## 2023-11-19 LAB
BACTERIA SPEC CULT: NORMAL
BACTERIA SPEC CULT: NORMAL
SERVICE CMNT-IMP: NORMAL
SERVICE CMNT-IMP: NORMAL

## 2023-12-13 ENCOUNTER — APPOINTMENT (OUTPATIENT)
Facility: HOSPITAL | Age: 88
DRG: 291 | End: 2023-12-13
Payer: MEDICARE

## 2023-12-13 ENCOUNTER — HOSPITAL ENCOUNTER (INPATIENT)
Facility: HOSPITAL | Age: 88
LOS: 13 days | Discharge: NURSING, REHAB OR LONG-TERM CARE | DRG: 291 | End: 2023-12-26
Attending: EMERGENCY MEDICINE | Admitting: INTERNAL MEDICINE
Payer: MEDICARE

## 2023-12-13 DIAGNOSIS — J90 PLEURAL EFFUSION: ICD-10-CM

## 2023-12-13 DIAGNOSIS — J96.01 ACUTE RESPIRATORY FAILURE WITH HYPOXIA (HCC): Primary | ICD-10-CM

## 2023-12-13 DIAGNOSIS — S81.811A NONINFECTED SKIN TEAR OF RIGHT LOWER EXTREMITY, INITIAL ENCOUNTER: ICD-10-CM

## 2023-12-13 PROBLEM — I50.43 CHF (CONGESTIVE HEART FAILURE), NYHA CLASS I, ACUTE ON CHRONIC, COMBINED (HCC): Status: ACTIVE | Noted: 2023-12-13

## 2023-12-13 LAB
ALBUMIN SERPL-MCNC: 2.4 G/DL (ref 3.5–5)
ALBUMIN/GLOB SERPL: 0.5 (ref 1.1–2.2)
ALP SERPL-CCNC: 150 U/L (ref 45–117)
ALT SERPL-CCNC: 19 U/L (ref 12–78)
ANION GAP SERPL CALC-SCNC: 8 MMOL/L (ref 5–15)
APPEARANCE UR: ABNORMAL
AST SERPL-CCNC: 18 U/L (ref 15–37)
BACTERIA URNS QL MICRO: ABNORMAL /HPF
BASOPHILS # BLD: 0 K/UL (ref 0–0.1)
BASOPHILS NFR BLD: 0 % (ref 0–1)
BILIRUB SERPL-MCNC: 0.7 MG/DL (ref 0.2–1)
BILIRUB UR QL: NEGATIVE
BUN SERPL-MCNC: 21 MG/DL (ref 6–20)
BUN/CREAT SERPL: 18 (ref 12–20)
CALCIUM SERPL-MCNC: 8.3 MG/DL (ref 8.5–10.1)
CHLORIDE SERPL-SCNC: 107 MMOL/L (ref 97–108)
CK SERPL-CCNC: 59 U/L (ref 26–192)
CO2 SERPL-SCNC: 24 MMOL/L (ref 21–32)
COLOR UR: ABNORMAL
COMMENT:: NORMAL
CREAT SERPL-MCNC: 1.14 MG/DL (ref 0.55–1.02)
DIFFERENTIAL METHOD BLD: ABNORMAL
EOSINOPHIL # BLD: 0 K/UL (ref 0–0.4)
EOSINOPHIL NFR BLD: 0 % (ref 0–7)
EPITH CASTS URNS QL MICRO: ABNORMAL /LPF
ERYTHROCYTE [DISTWIDTH] IN BLOOD BY AUTOMATED COUNT: 14.4 % (ref 11.5–14.5)
FLUAV AG NPH QL IA: NEGATIVE
FLUBV AG NOSE QL IA: NEGATIVE
GLOBULIN SER CALC-MCNC: 5.2 G/DL (ref 2–4)
GLUCOSE SERPL-MCNC: 124 MG/DL (ref 65–100)
GLUCOSE UR STRIP.AUTO-MCNC: NEGATIVE MG/DL
HCT VFR BLD AUTO: 26.9 % (ref 35–47)
HGB BLD-MCNC: 8.3 G/DL (ref 11.5–16)
HGB UR QL STRIP: NEGATIVE
IMM GRANULOCYTES # BLD AUTO: 0.1 K/UL (ref 0–0.04)
IMM GRANULOCYTES NFR BLD AUTO: 1 % (ref 0–0.5)
INR PPP: 1.1 (ref 0.9–1.1)
KETONES UR QL STRIP.AUTO: NEGATIVE MG/DL
LEUKOCYTE ESTERASE UR QL STRIP.AUTO: ABNORMAL
LYMPHOCYTES # BLD: 1.1 K/UL (ref 0.8–3.5)
LYMPHOCYTES NFR BLD: 11 % (ref 12–49)
MCH RBC QN AUTO: 30.7 PG (ref 26–34)
MCHC RBC AUTO-ENTMCNC: 30.9 G/DL (ref 30–36.5)
MCV RBC AUTO: 99.6 FL (ref 80–99)
MONOCYTES # BLD: 0.9 K/UL (ref 0–1)
MONOCYTES NFR BLD: 9 % (ref 5–13)
NEUTS SEG # BLD: 8.2 K/UL (ref 1.8–8)
NEUTS SEG NFR BLD: 79 % (ref 32–75)
NITRITE UR QL STRIP.AUTO: NEGATIVE
NRBC # BLD: 0 K/UL (ref 0–0.01)
NRBC BLD-RTO: 0 PER 100 WBC
NT PRO BNP: ABNORMAL PG/ML
PH UR STRIP: >8.5 [PH] (ref 5–8)
PLATELET # BLD AUTO: 264 K/UL (ref 150–400)
PMV BLD AUTO: 10.1 FL (ref 8.9–12.9)
POTASSIUM SERPL-SCNC: 4.3 MMOL/L (ref 3.5–5.1)
PROT SERPL-MCNC: 7.6 G/DL (ref 6.4–8.2)
PROT UR STRIP-MCNC: 30 MG/DL
PROTHROMBIN TIME: 11.3 SEC (ref 9–11.1)
RBC # BLD AUTO: 2.7 M/UL (ref 3.8–5.2)
RBC #/AREA URNS HPF: ABNORMAL /HPF (ref 0–5)
SARS-COV-2 RDRP RESP QL NAA+PROBE: NOT DETECTED
SODIUM SERPL-SCNC: 139 MMOL/L (ref 136–145)
SOURCE: NORMAL
SP GR UR REFRACTOMETRY: 1.02
SPECIMEN HOLD: NORMAL
TRI-PHOS CRY URNS QL MICRO: ABNORMAL
TROPONIN I SERPL HS-MCNC: 34 NG/L (ref 0–51)
URINE CULTURE IF INDICATED: ABNORMAL
UROBILINOGEN UR QL STRIP.AUTO: 0.2 EU/DL (ref 0.2–1)
WBC # BLD AUTO: 10.4 K/UL (ref 3.6–11)
WBC URNS QL MICRO: ABNORMAL /HPF (ref 0–4)

## 2023-12-13 PROCEDURE — 73562 X-RAY EXAM OF KNEE 3: CPT

## 2023-12-13 PROCEDURE — 36415 COLL VENOUS BLD VENIPUNCTURE: CPT

## 2023-12-13 PROCEDURE — 93005 ELECTROCARDIOGRAM TRACING: CPT | Performed by: EMERGENCY MEDICINE

## 2023-12-13 PROCEDURE — 90471 IMMUNIZATION ADMIN: CPT | Performed by: EMERGENCY MEDICINE

## 2023-12-13 PROCEDURE — 84484 ASSAY OF TROPONIN QUANT: CPT

## 2023-12-13 PROCEDURE — 87077 CULTURE AEROBIC IDENTIFY: CPT

## 2023-12-13 PROCEDURE — 6370000000 HC RX 637 (ALT 250 FOR IP): Performed by: INTERNAL MEDICINE

## 2023-12-13 PROCEDURE — 70450 CT HEAD/BRAIN W/O DYE: CPT

## 2023-12-13 PROCEDURE — 96375 TX/PRO/DX INJ NEW DRUG ADDON: CPT

## 2023-12-13 PROCEDURE — 85610 PROTHROMBIN TIME: CPT

## 2023-12-13 PROCEDURE — 6360000002 HC RX W HCPCS: Performed by: EMERGENCY MEDICINE

## 2023-12-13 PROCEDURE — 80053 COMPREHEN METABOLIC PANEL: CPT

## 2023-12-13 PROCEDURE — 86850 RBC ANTIBODY SCREEN: CPT

## 2023-12-13 PROCEDURE — 86901 BLOOD TYPING SEROLOGIC RH(D): CPT

## 2023-12-13 PROCEDURE — 87086 URINE CULTURE/COLONY COUNT: CPT

## 2023-12-13 PROCEDURE — 90471 IMMUNIZATION ADMIN: CPT

## 2023-12-13 PROCEDURE — 1100000000 HC RM PRIVATE

## 2023-12-13 PROCEDURE — 99285 EMERGENCY DEPT VISIT HI MDM: CPT

## 2023-12-13 PROCEDURE — 72125 CT NECK SPINE W/O DYE: CPT

## 2023-12-13 PROCEDURE — 87186 SC STD MICRODIL/AGAR DIL: CPT

## 2023-12-13 PROCEDURE — 86900 BLOOD TYPING SEROLOGIC ABO: CPT

## 2023-12-13 PROCEDURE — 87635 SARS-COV-2 COVID-19 AMP PRB: CPT

## 2023-12-13 PROCEDURE — 81001 URINALYSIS AUTO W/SCOPE: CPT

## 2023-12-13 PROCEDURE — 2580000003 HC RX 258: Performed by: INTERNAL MEDICINE

## 2023-12-13 PROCEDURE — 90715 TDAP VACCINE 7 YRS/> IM: CPT | Performed by: EMERGENCY MEDICINE

## 2023-12-13 PROCEDURE — 82550 ASSAY OF CK (CPK): CPT

## 2023-12-13 PROCEDURE — 73080 X-RAY EXAM OF ELBOW: CPT

## 2023-12-13 PROCEDURE — 6370000000 HC RX 637 (ALT 250 FOR IP): Performed by: NURSE PRACTITIONER

## 2023-12-13 PROCEDURE — 6370000000 HC RX 637 (ALT 250 FOR IP): Performed by: EMERGENCY MEDICINE

## 2023-12-13 PROCEDURE — 71275 CT ANGIOGRAPHY CHEST: CPT

## 2023-12-13 PROCEDURE — 12006 RPR S/N/A/GEN/TRK20.1-30.0CM: CPT

## 2023-12-13 PROCEDURE — 6360000004 HC RX CONTRAST MEDICATION: Performed by: EMERGENCY MEDICINE

## 2023-12-13 PROCEDURE — 73590 X-RAY EXAM OF LOWER LEG: CPT

## 2023-12-13 PROCEDURE — 96374 THER/PROPH/DIAG INJ IV PUSH: CPT

## 2023-12-13 PROCEDURE — 87804 INFLUENZA ASSAY W/OPTIC: CPT

## 2023-12-13 PROCEDURE — 83880 ASSAY OF NATRIURETIC PEPTIDE: CPT

## 2023-12-13 PROCEDURE — 85025 COMPLETE CBC W/AUTO DIFF WBC: CPT

## 2023-12-13 PROCEDURE — 74176 CT ABD & PELVIS W/O CONTRAST: CPT

## 2023-12-13 RX ORDER — METOPROLOL SUCCINATE 25 MG/1
25 TABLET, EXTENDED RELEASE ORAL DAILY
Status: DISCONTINUED | OUTPATIENT
Start: 2023-12-13 | End: 2023-12-26 | Stop reason: HOSPADM

## 2023-12-13 RX ORDER — IPRATROPIUM BROMIDE AND ALBUTEROL SULFATE 2.5; .5 MG/3ML; MG/3ML
1 SOLUTION RESPIRATORY (INHALATION)
Status: DISCONTINUED | OUTPATIENT
Start: 2023-12-13 | End: 2023-12-14

## 2023-12-13 RX ORDER — ONDANSETRON 2 MG/ML
4 INJECTION INTRAMUSCULAR; INTRAVENOUS ONCE
Status: COMPLETED | OUTPATIENT
Start: 2023-12-13 | End: 2023-12-13

## 2023-12-13 RX ORDER — OXCARBAZEPINE 300 MG/1
150 TABLET, FILM COATED ORAL 2 TIMES DAILY
Status: DISCONTINUED | OUTPATIENT
Start: 2023-12-13 | End: 2023-12-26 | Stop reason: HOSPADM

## 2023-12-13 RX ORDER — GINSENG 100 MG
CAPSULE ORAL
Status: COMPLETED | OUTPATIENT
Start: 2023-12-13 | End: 2023-12-13

## 2023-12-13 RX ORDER — ASPIRIN 81 MG/1
81 TABLET, CHEWABLE ORAL DAILY
Status: DISCONTINUED | OUTPATIENT
Start: 2023-12-13 | End: 2023-12-26 | Stop reason: HOSPADM

## 2023-12-13 RX ORDER — FUROSEMIDE 10 MG/ML
20 INJECTION INTRAMUSCULAR; INTRAVENOUS DAILY
Status: DISCONTINUED | OUTPATIENT
Start: 2023-12-14 | End: 2023-12-14

## 2023-12-13 RX ORDER — ONDANSETRON 4 MG/1
4 TABLET, ORALLY DISINTEGRATING ORAL EVERY 8 HOURS PRN
Status: DISCONTINUED | OUTPATIENT
Start: 2023-12-13 | End: 2023-12-26 | Stop reason: HOSPADM

## 2023-12-13 RX ORDER — AMLODIPINE BESYLATE 2.5 MG/1
2.5 TABLET ORAL DAILY
Status: DISCONTINUED | OUTPATIENT
Start: 2023-12-13 | End: 2023-12-26 | Stop reason: HOSPADM

## 2023-12-13 RX ORDER — PRAVASTATIN SODIUM 10 MG
10 TABLET ORAL NIGHTLY
Status: DISCONTINUED | OUTPATIENT
Start: 2023-12-13 | End: 2023-12-26 | Stop reason: HOSPADM

## 2023-12-13 RX ORDER — SODIUM CHLORIDE 9 MG/ML
INJECTION, SOLUTION INTRAVENOUS PRN
Status: DISCONTINUED | OUTPATIENT
Start: 2023-12-13 | End: 2023-12-26 | Stop reason: HOSPADM

## 2023-12-13 RX ORDER — ACETAMINOPHEN 325 MG/1
650 TABLET ORAL EVERY 6 HOURS PRN
Status: DISCONTINUED | OUTPATIENT
Start: 2023-12-13 | End: 2023-12-26 | Stop reason: HOSPADM

## 2023-12-13 RX ORDER — GINSENG 100 MG
CAPSULE ORAL ONCE
Status: DISCONTINUED | OUTPATIENT
Start: 2023-12-13 | End: 2023-12-26 | Stop reason: HOSPADM

## 2023-12-13 RX ORDER — FENTANYL CITRATE 50 UG/ML
25 INJECTION, SOLUTION INTRAMUSCULAR; INTRAVENOUS ONCE
Status: COMPLETED | OUTPATIENT
Start: 2023-12-13 | End: 2023-12-13

## 2023-12-13 RX ORDER — FUROSEMIDE 10 MG/ML
20 INJECTION INTRAMUSCULAR; INTRAVENOUS ONCE
Status: COMPLETED | OUTPATIENT
Start: 2023-12-13 | End: 2023-12-13

## 2023-12-13 RX ORDER — SODIUM CHLORIDE 0.9 % (FLUSH) 0.9 %
5-40 SYRINGE (ML) INJECTION EVERY 12 HOURS SCHEDULED
Status: DISCONTINUED | OUTPATIENT
Start: 2023-12-13 | End: 2023-12-26 | Stop reason: HOSPADM

## 2023-12-13 RX ORDER — ACETAMINOPHEN 650 MG/1
650 SUPPOSITORY RECTAL EVERY 6 HOURS PRN
Status: DISCONTINUED | OUTPATIENT
Start: 2023-12-13 | End: 2023-12-26 | Stop reason: HOSPADM

## 2023-12-13 RX ORDER — POLYETHYLENE GLYCOL 3350 17 G/17G
17 POWDER, FOR SOLUTION ORAL DAILY PRN
Status: DISCONTINUED | OUTPATIENT
Start: 2023-12-13 | End: 2023-12-26 | Stop reason: HOSPADM

## 2023-12-13 RX ORDER — ENOXAPARIN SODIUM 100 MG/ML
30 INJECTION SUBCUTANEOUS DAILY
Status: DISCONTINUED | OUTPATIENT
Start: 2023-12-13 | End: 2023-12-26 | Stop reason: HOSPADM

## 2023-12-13 RX ORDER — ONDANSETRON 2 MG/ML
4 INJECTION INTRAMUSCULAR; INTRAVENOUS EVERY 6 HOURS PRN
Status: DISCONTINUED | OUTPATIENT
Start: 2023-12-13 | End: 2023-12-26 | Stop reason: HOSPADM

## 2023-12-13 RX ORDER — SODIUM CHLORIDE 0.9 % (FLUSH) 0.9 %
5-40 SYRINGE (ML) INJECTION PRN
Status: DISCONTINUED | OUTPATIENT
Start: 2023-12-13 | End: 2023-12-26 | Stop reason: HOSPADM

## 2023-12-13 RX ADMIN — IOPAMIDOL 100 ML: 755 INJECTION, SOLUTION INTRAVENOUS at 10:12

## 2023-12-13 RX ADMIN — PRAVASTATIN SODIUM 10 MG: 10 TABLET ORAL at 20:53

## 2023-12-13 RX ADMIN — ASPIRIN 81 MG: 81 TABLET, CHEWABLE ORAL at 20:59

## 2023-12-13 RX ADMIN — BACITRACIN: 500 OINTMENT TOPICAL at 11:21

## 2023-12-13 RX ADMIN — FENTANYL CITRATE 25 MCG: 50 INJECTION INTRAMUSCULAR; INTRAVENOUS at 09:43

## 2023-12-13 RX ADMIN — TETANUS TOXOID, REDUCED DIPHTHERIA TOXOID AND ACELLULAR PERTUSSIS VACCINE, ADSORBED 0.5 ML: 5; 2.5; 8; 8; 2.5 SUSPENSION INTRAMUSCULAR at 09:27

## 2023-12-13 RX ADMIN — OXCARBAZEPINE 150 MG: 300 TABLET, FILM COATED ORAL at 22:29

## 2023-12-13 RX ADMIN — METOPROLOL SUCCINATE 25 MG: 25 TABLET, EXTENDED RELEASE ORAL at 21:01

## 2023-12-13 RX ADMIN — FUROSEMIDE 20 MG: 10 INJECTION, SOLUTION INTRAMUSCULAR; INTRAVENOUS at 12:13

## 2023-12-13 RX ADMIN — SODIUM CHLORIDE, PRESERVATIVE FREE 10 ML: 5 INJECTION INTRAVENOUS at 20:56

## 2023-12-13 RX ADMIN — ONDANSETRON 4 MG: 2 INJECTION INTRAMUSCULAR; INTRAVENOUS at 09:43

## 2023-12-13 RX ADMIN — AMLODIPINE BESYLATE 2.5 MG: 2.5 TABLET ORAL at 21:01

## 2023-12-13 ASSESSMENT — PAIN SCALES - GENERAL
PAINLEVEL_OUTOF10: 0
PAINLEVEL_OUTOF10: 10

## 2023-12-13 ASSESSMENT — PAIN - FUNCTIONAL ASSESSMENT: PAIN_FUNCTIONAL_ASSESSMENT: 0-10

## 2023-12-13 NOTE — ED PROVIDER NOTES
[facility] EMERGENCY DEPT  EMERGENCY DEPARTMENT ENCOUNTER       Pt Name: Cathy Jones  MRN: [id number]  Birthdate 5/23/1927  Date of evaluation: 12/13/2023  Provider: Manuel Cline MD   PCP: RADHA Mcnamara IV, MD  Note Started: 9:47 AM EST 12/13/23     CHIEF COMPLAINT       Chief Complaint   Patient presents with    Fall     Pt arrives to ED via EMS coming from nursing home. Nursing staff checked on pt around 6 a.m. and then around 8 a.m. found pt on carpeted floor in her room. Pt has large lack to R thigh, and bruising to R elbow/arm area. Pt has dementia at baseline. Pt also has expressive aphasia at baseline from hx of CVA. Unsure if pt hit her head or if there was any LOC due to pt dementia. Pt is hypoxic on RA . Placing pt on NC.     Leg Injury        HISTORY OF PRESENT ILLNESS: 1 or more elements      History From: patient, EMS, History limited by: none     Cathy Jones is a 89 y.o. female with a history of aphasia, skin cancer, hypertension hyperlipidemia not on blood thinners presents emerged department after a fall.    Patient reports was found on the ground at her skilled nursing facility.  Patient is unsure how she got there.  Patient not sure if she hit her head.  Not currently complaining of any pain.  Patient is a large laceration to her right calf.  Unsure when last tetanus was.     Of note upon arrival patient 85% on room air.  Placed on 2 L nasal cannula.    Please See MDM for Additional Details of the HPI/PMH  Nursing Notes were all reviewed and agreed with or any disagreements were addressed in the HPI.     REVIEW OF SYSTEMS        Positives and Pertinent negatives as per HPI.    PAST HISTORY     Past Medical History:  Past Medical History:   Diagnosis Date    Arthritis     RASHARD KNEES.    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 Supportive care and return precautions. FINAL IMPRESSION     1. Acute respiratory failure with hypoxia ([address])    2. Pleural effusion    3. Noninfected skin tear of right lower extremity, initial encounter          DISPOSITION/PLAN   Shama Adan  results have been reviewed with her. She has been counseled regarding her diagnosis, treatment, and plan. She verbally conveys understanding and agreement of the signs, symptoms, diagnosis, treatment and prognosis and additionally agrees to follow up as discussed. She also agrees with the care-plan and conveys that all of her questions have been answered. I have also provided discharge instructions for her that include: educational information regarding their diagnosis and treatment, and list of reasons why they would want to return to the ED prior to their follow-up appointment, should her condition change. CLINICAL IMPRESSION    DISPOSITION Admitted 12/13/2023 12:05:42 PM    I am the Primary Clinician of Record. Kesha Hooker MD (electronically signed)    (Please note that parts of this dictation were completed with voice recognition software. Quite often unanticipated grammatical, syntax, homophones, and other interpretive errors are inadvertently transcribed by the computer software. Please disregards these errors.  Please excuse any errors that have escaped final proofreading.)         Kesha Hooker MD  12/13/23 9001

## 2023-12-13 NOTE — ED NOTES
SBAR telephone report given to Johnnie Hoover., RN . Receiving RN aware that meds in [facility] that are due now still need to be given, cards consult still needs to be completed, and repeat troponin still needs to be collected.       Johnson Guardado, SHA  12/13/23 9395

## 2023-12-13 NOTE — H&P
Hospitalist Admission Note    NAME:   Rockney Severe   : 1927   MRN: [id number]     Date/Time: 2023 12:05 PM    Patient PCP: Catarina Garcia MD    ______________________________________________________________________  Given the patient's current clinical presentation, I have a high level of concern for decompensation if discharged from the emergency department. Complex decision making was performed, which includes reviewing the patient's available past medical records, laboratory results, and x-ray films. My assessment of this patient's clinical condition and my plan of care is as follows. Assessment / Plan:  Acute hypoxic respiratory failure most likely secondary to CHF      Admit patient to med telemetry  Start patient on Lasix  Check proBNP  Continue oxygen supplement  Cardiology consultation  Consider thoracocentesis      Hypertension  Continue home antihypertensive medication    Hyperlipidemia  Continue pravastatin    History of CAD  Continue aspirin    History of stroke  Continue aspirin      Anemia  Check occult blood stool  Continue close monitoring    S/p fall  Right lower extremity wound    Wound care consultation              Medical Decision Making:   I personally reviewed labs: CBC, BMP  I personally reviewed imaging: Chest x-ray  I personally reviewed EKG: Yes  Toxic drug monitoring:   Discussed case with: ED provider. After discussion I am in agreement that acuity of patient's medical condition necessitates hospital stay.       Code Status: DNR/DNI  DVT Prophylaxis: Heparin  Baseline:     Subjective:   CHIEF COMPLAINT: Shortness of breath    HISTORY OF PRESENT ILLNESS:     Bryanna Barton is a 80 y.o.  female with PMHx significant for hypertension, hyperlipidemia, history of stroke, trigeminal neuralgia presented to the hospital for evaluation of generalized weakness s/p fall, patient was found on the ground, denies any chest pain denies any headache, blood work was

## 2023-12-13 NOTE — CARE COORDINATION
CM attempted to speak with patient via room phone, rings. Call placed to patient's daughter Ravin Huynh [phone] no answer.       [facility]

## 2023-12-14 LAB
ABO + RH BLD: NORMAL
ANION GAP SERPL CALC-SCNC: 5 MMOL/L (ref 5–15)
BASOPHILS # BLD: 0 K/UL (ref 0–0.1)
BASOPHILS NFR BLD: 0 % (ref 0–1)
BLOOD GROUP ANTIBODIES SERPL: NORMAL
BUN SERPL-MCNC: 22 MG/DL (ref 6–20)
BUN/CREAT SERPL: 22 (ref 12–20)
CALCIUM SERPL-MCNC: 7.9 MG/DL (ref 8.5–10.1)
CHLORIDE SERPL-SCNC: 106 MMOL/L (ref 97–108)
CO2 SERPL-SCNC: 25 MMOL/L (ref 21–32)
CREAT SERPL-MCNC: 1.02 MG/DL (ref 0.55–1.02)
DIFFERENTIAL METHOD BLD: ABNORMAL
EKG ATRIAL RATE: 93 BPM
EKG DIAGNOSIS: NORMAL
EKG P AXIS: -19 DEGREES
EKG P-R INTERVAL: 170 MS
EKG Q-T INTERVAL: 384 MS
EKG QRS DURATION: 90 MS
EKG QTC CALCULATION (BAZETT): 477 MS
EKG R AXIS: 26 DEGREES
EKG T AXIS: 31 DEGREES
EKG VENTRICULAR RATE: 93 BPM
EOSINOPHIL # BLD: 0.4 K/UL (ref 0–0.4)
EOSINOPHIL NFR BLD: 4 % (ref 0–7)
ERYTHROCYTE [DISTWIDTH] IN BLOOD BY AUTOMATED COUNT: 14.4 % (ref 11.5–14.5)
GLUCOSE SERPL-MCNC: 120 MG/DL (ref 65–100)
HCT VFR BLD AUTO: 22.6 % (ref 35–47)
HGB BLD-MCNC: 7 G/DL (ref 11.5–16)
IMM GRANULOCYTES # BLD AUTO: 0.1 K/UL (ref 0–0.04)
IMM GRANULOCYTES NFR BLD AUTO: 1 % (ref 0–0.5)
LYMPHOCYTES # BLD: 1.2 K/UL (ref 0.8–3.5)
LYMPHOCYTES NFR BLD: 13 % (ref 12–49)
MCH RBC QN AUTO: 30.3 PG (ref 26–34)
MCHC RBC AUTO-ENTMCNC: 31 G/DL (ref 30–36.5)
MCV RBC AUTO: 97.8 FL (ref 80–99)
MONOCYTES # BLD: 1 K/UL (ref 0–1)
MONOCYTES NFR BLD: 10 % (ref 5–13)
NEUTS SEG # BLD: 7.1 K/UL (ref 1.8–8)
NEUTS SEG NFR BLD: 72 % (ref 32–75)
NRBC # BLD: 0 K/UL (ref 0–0.01)
NRBC BLD-RTO: 0 PER 100 WBC
PLATELET # BLD AUTO: 231 K/UL (ref 150–400)
PMV BLD AUTO: 10.2 FL (ref 8.9–12.9)
POTASSIUM SERPL-SCNC: 4.7 MMOL/L (ref 3.5–5.1)
RBC # BLD AUTO: 2.31 M/UL (ref 3.8–5.2)
SODIUM SERPL-SCNC: 136 MMOL/L (ref 136–145)
SPECIMEN EXP DATE BLD: NORMAL
WBC # BLD AUTO: 9.8 K/UL (ref 3.6–11)

## 2023-12-14 PROCEDURE — 6360000002 HC RX W HCPCS: Performed by: INTERNAL MEDICINE

## 2023-12-14 PROCEDURE — 94640 AIRWAY INHALATION TREATMENT: CPT

## 2023-12-14 PROCEDURE — 80048 BASIC METABOLIC PNL TOTAL CA: CPT

## 2023-12-14 PROCEDURE — 6370000000 HC RX 637 (ALT 250 FOR IP): Performed by: INTERNAL MEDICINE

## 2023-12-14 PROCEDURE — 2580000003 HC RX 258: Performed by: INTERNAL MEDICINE

## 2023-12-14 PROCEDURE — 1100000000 HC RM PRIVATE

## 2023-12-14 PROCEDURE — 94760 N-INVAS EAR/PLS OXIMETRY 1: CPT

## 2023-12-14 PROCEDURE — 36415 COLL VENOUS BLD VENIPUNCTURE: CPT

## 2023-12-14 PROCEDURE — 2700000000 HC OXYGEN THERAPY PER DAY

## 2023-12-14 PROCEDURE — 85025 COMPLETE CBC W/AUTO DIFF WBC: CPT

## 2023-12-14 PROCEDURE — 6370000000 HC RX 637 (ALT 250 FOR IP): Performed by: NURSE PRACTITIONER

## 2023-12-14 RX ORDER — PANTOPRAZOLE SODIUM 40 MG/1
40 TABLET, DELAYED RELEASE ORAL
Status: DISCONTINUED | OUTPATIENT
Start: 2023-12-14 | End: 2023-12-14

## 2023-12-14 RX ORDER — FUROSEMIDE 10 MG/ML
40 INJECTION INTRAMUSCULAR; INTRAVENOUS 2 TIMES DAILY
Status: COMPLETED | OUTPATIENT
Start: 2023-12-14 | End: 2023-12-15

## 2023-12-14 RX ORDER — IPRATROPIUM BROMIDE AND ALBUTEROL SULFATE 2.5; .5 MG/3ML; MG/3ML
1 SOLUTION RESPIRATORY (INHALATION) EVERY 4 HOURS PRN
Status: DISCONTINUED | OUTPATIENT
Start: 2023-12-14 | End: 2023-12-26 | Stop reason: HOSPADM

## 2023-12-14 RX ORDER — FUROSEMIDE 10 MG/ML
40 INJECTION INTRAMUSCULAR; INTRAVENOUS 2 TIMES DAILY
Status: DISCONTINUED | OUTPATIENT
Start: 2023-12-14 | End: 2023-12-14

## 2023-12-14 RX ADMIN — FUROSEMIDE 40 MG: 10 INJECTION, SOLUTION INTRAMUSCULAR; INTRAVENOUS at 18:16

## 2023-12-14 RX ADMIN — IPRATROPIUM BROMIDE AND ALBUTEROL SULFATE 1 DOSE: .5; 3 SOLUTION RESPIRATORY (INHALATION) at 21:15

## 2023-12-14 RX ADMIN — SODIUM CHLORIDE, PRESERVATIVE FREE 10 ML: 5 INJECTION INTRAVENOUS at 20:47

## 2023-12-14 RX ADMIN — OXCARBAZEPINE 150 MG: 300 TABLET, FILM COATED ORAL at 08:44

## 2023-12-14 RX ADMIN — IPRATROPIUM BROMIDE AND ALBUTEROL SULFATE 1 DOSE: .5; 3 SOLUTION RESPIRATORY (INHALATION) at 14:30

## 2023-12-14 RX ADMIN — PRAVASTATIN SODIUM 10 MG: 10 TABLET ORAL at 20:47

## 2023-12-14 RX ADMIN — AMLODIPINE BESYLATE 2.5 MG: 2.5 TABLET ORAL at 08:45

## 2023-12-14 RX ADMIN — IPRATROPIUM BROMIDE AND ALBUTEROL SULFATE 1 DOSE: .5; 3 SOLUTION RESPIRATORY (INHALATION) at 08:50

## 2023-12-14 RX ADMIN — ASPIRIN 81 MG: 81 TABLET, CHEWABLE ORAL at 08:45

## 2023-12-14 RX ADMIN — OXCARBAZEPINE 150 MG: 300 TABLET, FILM COATED ORAL at 20:47

## 2023-12-14 RX ADMIN — FUROSEMIDE 20 MG: 10 INJECTION, SOLUTION INTRAMUSCULAR; INTRAVENOUS at 08:44

## 2023-12-14 RX ADMIN — IPRATROPIUM BROMIDE AND ALBUTEROL SULFATE 1 DOSE: .5; 3 SOLUTION RESPIRATORY (INHALATION) at 13:20

## 2023-12-14 RX ADMIN — SODIUM CHLORIDE, PRESERVATIVE FREE 10 ML: 5 INJECTION INTRAVENOUS at 08:52

## 2023-12-14 RX ADMIN — METOPROLOL SUCCINATE 25 MG: 25 TABLET, EXTENDED RELEASE ORAL at 08:44

## 2023-12-14 ASSESSMENT — PAIN SCALES - GENERAL: PAINLEVEL_OUTOF10: 0

## 2023-12-14 NOTE — CARE COORDINATION
Care Management Initial Assessment       RUR:19%  Readmission? Yes - discharged 11/18/23  1st IM letter given? YES  1st  letter given: Yes         12/14/23 9464   Service Assessment   Patient Orientation Unable to Assess   Cognition Alert   History Provided By Child/Family   Primary Caregiver Other (Comment)  ([facility])   Support Systems Children;Family Members; Other (Comment)  ([facility], [facility])   [address] is: Named in [address] [facility]   PCP Verified by CM Yes   Last Visit to PCP Within last 6 months   Prior Functional Level Assistance with the following:;Dressing; Bathing; Toileting;Cooking;Housework; Shopping;Mobility   Current Functional Level Assistance with the following:;Bathing;Dressing;Cooking;Housework; Mobility   Can patient return to prior living arrangement Yes   Ability to make needs known: Good   Family able to assist with home care needs: Yes   Would you like for me to discuss the discharge plan with any other family members/significant others, and if so, who? Yes  Jignesh Castillo (daughter))   Financial Resources Medicare; Other (Comment)  ()   Community Resources Assisted Living   Social/Functional History   Lives With Alone; Other (comment)  ([facility])   Type of Home Assisted living   Home Layout One level   Home Access Level entry;Elevator   Bathroom Shower/Tub Walk-in shower   Bathroom Toilet Handicap height   Bathroom Equipment Built-in shower seat;Grab bars around toilet;Grab bars in shower; Toilet raiser   Bathroom Accessibility Wheelchair accessible   [place], 4 wheeled; Walker, [place] Help From Personal care attendant;Home health   ADL Assistance Needs assistance   Toileting Needs assistance   Homemaking Assistance Needs assistance   Ambulation Assistance Needs assistance   Transfer Assistance Independent   Active  No   Occupation Retired   Discharge Planning   Type of [address] [place] [address]; Other

## 2023-12-14 NOTE — PLAN OF CARE
Problem: Skin/Tissue Integrity  Goal: Absence of new skin breakdown  Description: 1. Monitor for areas of redness and/or skin breakdown  2. Assess vascular access sites hourly  3. Every 4-6 hours minimum:  Change oxygen saturation probe site  4. Every 4-6 hours:  If on nasal continuous positive airway pressure, respiratory therapy assess nares and determine need for appliance change or resting period.   Outcome: Progressing     Problem: Discharge Planning  Goal: Discharge to home or other facility with appropriate resources  Outcome: Progressing  Flowsheets (Taken 12/13/2023 1853 by Laura Contreras RN)  Discharge to home or other facility with appropriate resources: Identify barriers to discharge with patient and caregiver     Problem: Pain  Goal: Verbalizes/displays adequate comfort level or baseline comfort level  Outcome: Progressing     Problem: Safety - Adult  Goal: Free from fall injury  Outcome: Progressing

## 2023-12-14 NOTE — PROGRESS NOTES
Bedside and Verbal shift change report given to Jules Nova LPN (oncoming nurse) by Arash Shell RN (offgoing nurse). Report included the following information Nurse Handoff Report, Index, Adult Overview, Intake/Output, MAR, Recent Results, Cardiac Rhythm sinus rhythm, Quality Measures, Neuro Assessment, and Event Log.

## 2023-12-14 NOTE — PROGRESS NOTES
Physical Therapy  Referral received, chart reviewed and attempted to see patient for PT evaluation. Noted patient with HGB of 7.0 and RN reports no plans for transfusion from MD as of yet. Patient in bed on arrival. She reports increased fatigue and politely requesting to wait until tomorrow to attempt activity. She does report having assist from staff at [facility] for all mobility, using a rollator for all ambulation and a history of falls. Will defer today per her request and plan to follow up tomorrow.    Vineet Farfan, PT, DPT

## 2023-12-14 NOTE — CONSULTS
Cardiology Consult Note    CC: Fall/weakness    PCP:Selma Mcnamara MD  Reason for consult:  CHF  Requesting MD:  Dr. Alyse Jacobo Date: 12/13/2023   Today's Date: 12/14/2023   Cardiologist:  Dr Barbara Mckeon. Cardiac Assessment/Plan (as noted by Dr Barbara Mckeon 11/17/23):   - No hx of CAD  - Echo 11/2014 EF 40%, moderate MR  - Sinus with LVH with RA  - HTN  - Dyslipidemia  - CKD with crcl of 35  - H/o of CVA with difficulty in speech  - No tobacco, etoh, no drugs  , lives in assisted living, uses a rollator  DNR    Admit 11/14 to 11/18/23 to w/ Hypoxic respiratory failure; Increased hs troponin consistent with NSTEMI  COVID pneumonia; UTI; Acute on chronic systolic chf  Rec then: - No chest pain, increased hs troponin consistent with NSTEMI, at age 80 with her comorbidities poor candidate for invasive treatment, manage medically  - Increased pBNP with bilateral pleural effusions, will diurese  - Sinus  - ProCal low     Echo mod decreased EF     - Cont added aspirin  - cont metoprolol  - Holding amlodipine  - Cot added lasix 40mg iv bid, follow bmp, O2 down to 4L  - Cont atorvastatin     Back 12/13/23 with weakness/fall & found on ground; Anemia (Hg 7) and pleural effusions. She is unable to provide accurate history; no apparent complaints  Current cardiac meds: norvasc 2.5; asa; lasix 20 IV qd; toprol XL 25; pravachol 10. Rec: cont diuresis; lasix to 40 IV bid; watch electrolytes/renal Fxn; hold asa with anemia;      Echo 11/14/23:    Left Ventricle: Reduced left ventricular systolic function with a visually estimated EF of 40 - 45%. Left ventricle size is normal. Increased wall thickness. Normal wall motion. Aortic Valve: Mild regurgitation. Mitral Valve: Moderate regurgitation. Tricuspid Valve:  The estimated RVSP is 39 mmHg.  ____________________________________________________________________  Shamika Turkmen is a 80 y.o. female presents with Pleural effusion [J90]  Acute respiratory failure Gravity, UA 1.016      pH, Urine >8.5 (H) 5.0 - 8.0    Protein, UA 30 (A) NEG mg/dL    Glucose, UA Negative NEG mg/dL    Ketones, Urine Negative NEG mg/dL    Bilirubin Urine Negative NEG      Blood, Urine Negative NEG      Urobilinogen, Urine 0.2 0.2 - 1.0 EU/dL    Nitrite, Urine Negative NEG      Leukocyte Esterase, Urine MODERATE (A) NEG      WBC, UA 10-20 0 - 4 /hpf    RBC, UA 0-5 0 - 5 /hpf    Epithelial Cells UA FEW FEW /lpf    BACTERIA, URINE 4+ (A) NEG /hpf    Urine Culture if Indicated URINE CULTURE ORDERED (A) CNI      TRIPLE PHOSPHATE CRYSTALS 1+ (A) NEG   Basic Metabolic Panel w/ Reflex to MG    Collection Time: 12/14/23  4:45 AM   Result Value Ref Range    Sodium 136 136 - 145 mmol/L    Potassium 4.7 3.5 - 5.1 mmol/L    Chloride 106 97 - 108 mmol/L    CO2 25 21 - 32 mmol/L    Anion Gap 5 5 - 15 mmol/L    Glucose 120 (H) 65 - 100 mg/dL    BUN 22 (H) 6 - 20 MG/DL    Creatinine 1.02 0.55 - 1.02 MG/DL    Bun/Cre Ratio 22 (H) 12 - 20      Est, Glom Filt Rate 50 (L) >60 ml/min/1.73m2    Calcium 7.9 (L) 8.5 - 10.1 MG/DL   CBC with Auto Differential    Collection Time: 12/14/23  4:45 AM   Result Value Ref Range    WBC 9.8 3.6 - 11.0 K/uL    RBC 2.31 (L) 3.80 - 5.20 M/uL    Hemoglobin 7.0 (L) 11.5 - 16.0 g/dL    Hematocrit 22.6 (L) 35.0 - 47.0 %    MCV 97.8 80.0 - 99.0 FL    MCH 30.3 26.0 - 34.0 PG    MCHC 31.0 30.0 - 36.5 g/dL    RDW 14.4 11.5 - 14.5 %    Platelets 231 150 - 400 K/uL    MPV 10.2 8.9 - 12.9 FL    Nucleated RBCs 0.0 0  WBC    nRBC 0.00 0.00 - 0.01 K/uL    Neutrophils % 72 32 - 75 %    Lymphocytes % 13 12 - 49 %    Monocytes % 10 5 - 13 %    Eosinophils % 4 0 - 7 %    Basophils % 0 0 - 1 %    Immature Granulocytes 1 (H) 0.0 - 0.5 %    Neutrophils Absolute 7.1 1.8 - 8.0 K/UL    Lymphocytes Absolute 1.2 0.8 - 3.5 K/UL    Monocytes Absolute 1.0 0.0 - 1.0 K/UL    Eosinophils Absolute 0.4 0.0 - 0.4 K/UL    Basophils Absolute 0.0 0.0 - 0.1 K/UL    Absolute Immature Granulocyte 0.1 (H) 0.00 - 0.04

## 2023-12-15 LAB
ANION GAP SERPL CALC-SCNC: 6 MMOL/L (ref 5–15)
BACTERIA SPEC CULT: ABNORMAL
BASOPHILS # BLD: 0 K/UL (ref 0–0.1)
BASOPHILS NFR BLD: 0 % (ref 0–1)
BUN SERPL-MCNC: 26 MG/DL (ref 6–20)
BUN/CREAT SERPL: 21 (ref 12–20)
CALCIUM SERPL-MCNC: 8.6 MG/DL (ref 8.5–10.1)
CC UR VC: ABNORMAL
CHLORIDE SERPL-SCNC: 99 MMOL/L (ref 97–108)
CO2 SERPL-SCNC: 30 MMOL/L (ref 21–32)
CREAT SERPL-MCNC: 1.26 MG/DL (ref 0.55–1.02)
DIFFERENTIAL METHOD BLD: ABNORMAL
EOSINOPHIL # BLD: 0.4 K/UL (ref 0–0.4)
EOSINOPHIL NFR BLD: 4 % (ref 0–7)
ERYTHROCYTE [DISTWIDTH] IN BLOOD BY AUTOMATED COUNT: 14.6 % (ref 11.5–14.5)
GLUCOSE SERPL-MCNC: 138 MG/DL (ref 65–100)
HCT VFR BLD AUTO: 22.8 % (ref 35–47)
HGB BLD-MCNC: 7.4 G/DL (ref 11.5–16)
IMM GRANULOCYTES # BLD AUTO: 0.1 K/UL (ref 0–0.04)
IMM GRANULOCYTES NFR BLD AUTO: 1 % (ref 0–0.5)
LYMPHOCYTES # BLD: 1.9 K/UL (ref 0.8–3.5)
LYMPHOCYTES NFR BLD: 19 % (ref 12–49)
MAGNESIUM SERPL-MCNC: 1.9 MG/DL (ref 1.6–2.4)
MCH RBC QN AUTO: 31.4 PG (ref 26–34)
MCHC RBC AUTO-ENTMCNC: 32.5 G/DL (ref 30–36.5)
MCV RBC AUTO: 96.6 FL (ref 80–99)
MONOCYTES # BLD: 1 K/UL (ref 0–1)
MONOCYTES NFR BLD: 10 % (ref 5–13)
NEUTS SEG # BLD: 6.4 K/UL (ref 1.8–8)
NEUTS SEG NFR BLD: 66 % (ref 32–75)
NRBC # BLD: 0 K/UL (ref 0–0.01)
NRBC BLD-RTO: 0 PER 100 WBC
NT PRO BNP: ABNORMAL PG/ML
PLATELET # BLD AUTO: 273 K/UL (ref 150–400)
PMV BLD AUTO: 10 FL (ref 8.9–12.9)
POTASSIUM SERPL-SCNC: 4.2 MMOL/L (ref 3.5–5.1)
RBC # BLD AUTO: 2.36 M/UL (ref 3.8–5.2)
SERVICE CMNT-IMP: ABNORMAL
SODIUM SERPL-SCNC: 135 MMOL/L (ref 136–145)
WBC # BLD AUTO: 9.8 K/UL (ref 3.6–11)

## 2023-12-15 PROCEDURE — 83880 ASSAY OF NATRIURETIC PEPTIDE: CPT

## 2023-12-15 PROCEDURE — 2580000003 HC RX 258: Performed by: INTERNAL MEDICINE

## 2023-12-15 PROCEDURE — 1100000000 HC RM PRIVATE

## 2023-12-15 PROCEDURE — 97116 GAIT TRAINING THERAPY: CPT

## 2023-12-15 PROCEDURE — 36415 COLL VENOUS BLD VENIPUNCTURE: CPT

## 2023-12-15 PROCEDURE — 6370000000 HC RX 637 (ALT 250 FOR IP): Performed by: NURSE PRACTITIONER

## 2023-12-15 PROCEDURE — 83735 ASSAY OF MAGNESIUM: CPT

## 2023-12-15 PROCEDURE — 97530 THERAPEUTIC ACTIVITIES: CPT

## 2023-12-15 PROCEDURE — 97163 PT EVAL HIGH COMPLEX 45 MIN: CPT

## 2023-12-15 PROCEDURE — 2700000000 HC OXYGEN THERAPY PER DAY

## 2023-12-15 PROCEDURE — 6360000002 HC RX W HCPCS: Performed by: INTERNAL MEDICINE

## 2023-12-15 PROCEDURE — 97110 THERAPEUTIC EXERCISES: CPT

## 2023-12-15 PROCEDURE — 6370000000 HC RX 637 (ALT 250 FOR IP): Performed by: INTERNAL MEDICINE

## 2023-12-15 PROCEDURE — 85025 COMPLETE CBC W/AUTO DIFF WBC: CPT

## 2023-12-15 PROCEDURE — 94760 N-INVAS EAR/PLS OXIMETRY 1: CPT

## 2023-12-15 PROCEDURE — 80048 BASIC METABOLIC PNL TOTAL CA: CPT

## 2023-12-15 RX ORDER — FUROSEMIDE 10 MG/ML
40 INJECTION INTRAMUSCULAR; INTRAVENOUS DAILY
Status: DISCONTINUED | OUTPATIENT
Start: 2023-12-15 | End: 2023-12-17

## 2023-12-15 RX ADMIN — METOPROLOL SUCCINATE 25 MG: 25 TABLET, EXTENDED RELEASE ORAL at 10:01

## 2023-12-15 RX ADMIN — OXCARBAZEPINE 150 MG: 300 TABLET, FILM COATED ORAL at 20:38

## 2023-12-15 RX ADMIN — AMLODIPINE BESYLATE 2.5 MG: 2.5 TABLET ORAL at 10:02

## 2023-12-15 RX ADMIN — SODIUM CHLORIDE, PRESERVATIVE FREE 10 ML: 5 INJECTION INTRAVENOUS at 20:38

## 2023-12-15 RX ADMIN — OXCARBAZEPINE 150 MG: 300 TABLET, FILM COATED ORAL at 10:01

## 2023-12-15 RX ADMIN — SODIUM CHLORIDE, PRESERVATIVE FREE 10 ML: 5 INJECTION INTRAVENOUS at 10:02

## 2023-12-15 RX ADMIN — PRAVASTATIN SODIUM 10 MG: 10 TABLET ORAL at 20:38

## 2023-12-15 RX ADMIN — FUROSEMIDE 40 MG: 10 INJECTION, SOLUTION INTRAMUSCULAR; INTRAVENOUS at 13:55

## 2023-12-15 RX ADMIN — FUROSEMIDE 40 MG: 10 INJECTION, SOLUTION INTRAMUSCULAR; INTRAVENOUS at 10:02

## 2023-12-15 ASSESSMENT — PAIN SCALES - GENERAL
PAINLEVEL_OUTOF10: 0

## 2023-12-15 NOTE — PLAN OF CARE
Problem: Physical Therapy - Adult  Goal: By Discharge: Performs mobility at highest level of function for planned discharge setting. See evaluation for individualized goals. Description: FUNCTIONAL STATUS PRIOR TO ADMISSION: Patient was cg assistance using a rollator for functional mobility. and The patient  required moderate assistance for basic and instrumental ADLs. HOME SUPPORT PRIOR TO ADMISSION: The patient lived alone with paid help at [facility] and required moderate assistance for dressing and bathing. Physical Therapy Goals  Initiated 12/15/2023  1. Patient will move from supine to sit and sit to supine, scoot up and down, and roll side to side in bed with minimal assistance within 7 day(s). 2.  Patient will perform sit to stand with minimal assistance within 7 day(s). 3.  Patient will transfer from bed to chair and chair to bed with contact guard assist using the least restrictive device within 7 day(s). 4.  Patient will ambulate with contact guard assist for 35 feet with the least restrictive device within 7 day(s). Outcome: Progressing   PHYSICAL THERAPY EVALUATION    Patient: Marck Salazar (76 y.o. female)  Date: 12/15/2023  Primary Diagnosis: Pleural effusion [J90]  Acute respiratory failure with hypoxia (HCC) [J96.01]  CHF (congestive heart failure), NYHA class I, acute on chronic, combined ([facility]) [I50.43]  Noninfected skin tear of right lower extremity, initial encounter [F78.328Q]       Precautions: Fall Risk, Up as Tolerated, Bed Alarm                    ASSESSMENT :   DEFICITS/IMPAIRMENTS:   The patient is limited by decreased functional mobility, independence in ADLs, high-level IADLs, ROM, strength, body mechanics, activity tolerance, endurance, coordination, balance, increased pain levels, 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 O2 sats at rest with 2L O2 was 97% while supine in bed. Decreased to 83% on room air in same position at rest. Returned to 94% with 2L/min.  In sitting on room air dropped to 88%; increased to 96% on 1L O2. Updated RN.    Based on the impairments listed above the patient is needing increased assistance with bed mobility, transfers and ambulation than her baseline at [facility] noted above. Today she needed mod a for supine to sit and sit to stand transfers. Gait needed min a using RW for 6 feet. Left patient up in chair with all needs met when the session ended with daughter in room.     12/15/23 1114 12/15/23 1122 12/15/23 1132   Vital Signs   BP (!) 131/46 (!) 131/46 126/73   MAP (Calculated) 74 74 91   MAP (mmHg) 71 71 86   BP Location  --   --  Right upper arm   Patient Position Supine Supine Sitting      12/15/23 1138   Vital Signs3   /69   MAP (Calculated) 84   MAP (mmHg) 76   BP Location  --    Patient Position Sitting  (sitting after transfer to chair)       Patient will benefit from skilled intervention to address the above impairments.    Functional Outcome Measure:  The patient scored 13/24 on the [facility] outcome measure. Cutoff score ?171,2,3 had higher odds of discharging home with home health or need of SNF/IPR.        PLAN :  Recommendations and Planned Interventions:   bed mobility training, transfer training, gait training, therapeutic exercises, neuromuscular re-education, edema management/control, patient and family training/education, and therapeutic activities    Frequency/Duration: Patient will be followed by physical therapy to address goals, PT Plan of Care: 4 times/week to address goals.    Recommendation for discharge: (in order for the patient to meet his/her long term goals): Therapy 2 days/week in the home and also see \"other factors to consider\" below for additional discharge concerns/needs    Other factors to consider for discharge: high risk for falls and concern for safely navigating or managing the home environment    IF patient discharges home will need the following DME: patient owns DME required for discharge         SUBJECTIVE:   Patient  Scores With Discharge Destination. Phys Ther. 2021 4;101(4):rgwp761. doi: 10.1093/ptj/jpdg303. PMID: 33341897.  3. Michelle RODGERS, Valentina PERRIN, Josie S, Maxi DAVIS, Ailyn MORENO. Activity Measure for Post-Acute Care \"6-Clicks\" Basic Mobility Scores Predict Discharge Destination After Acute Care Hospitalization in Select Patient Groups: A Retrospective, Observational Study. Arch Rehabil Res Clin Transl. 2022 16;4(3):127459. doi: 10.1016/j.arrct..337037. PMID: 00995191; PMCID: FDM5183162.  4. Nani CHOW, Suzy S, Juan W, Paige P. AM-PAC Short Forms Manual 4.0. Revised 2020.                                                                                                                                                                                                                              Pain Ratin/10 R leg with pressure intermittently  Pain Intervention(s):   rest and repositioning    Activity Tolerance:   Good, Fair , and desaturates with activity and requires oxygen    After treatment:   Patient left in no apparent distress sitting up in chair, Call bell within reach, Bed/ chair alarm activated, and Caregiver / family present    COMMUNICATION/EDUCATION:   The patient's plan of care was discussed with: registered nurse and     Patient Education  Education Given To: Patient;Family  Education Provided: Role of Therapy;Plan of Care;Home Exercise Program;Precautions;Fall Prevention Strategies;Transfer Training  Education Method: Demonstration;Verbal  Education Outcome: Verbalized understanding;Demonstrated understanding (daughter present and verbalized understanding; patient needs  more training with transfers and ambulation.)    Thank you for this referral.  Sylvester Giraldo, AILEEN  Minutes: 53      Physical Therapy Evaluation Charge Determination   History Examination Presentation Decision-Making   HIGH Complexity :3+ comorbidities / personal factors will impact the outcome/ POC  HIGH Complexity : 4+

## 2023-12-15 NOTE — PROGRESS NOTES
Pt currently on remote cardiac monitoring. No remote telemetry order on file. Rhythm change from sinus rhythm to first degree block. RN advised.

## 2023-12-15 NOTE — CARE COORDINATION
Spoke w/ Wendy at [facility] EMERGENCY [facility]- bedside evaluation not required prior to pt's return to facility. PT/OT recommending HHC. CM will submit order to At [address] [place] [address] prior to d/c. Preferred pharmacy is [facility]. Assess for home O2 prior to d/c- pt dropped to 88% today on Room Air. RN to call report to [phone]. Return call number to [facility] [employer] is [phone]. CM will need to fax updated clinicals/d/c summary to [facility] upon d/c. [phone]. Will continue to follow.     Laura Baker, MSW   Care Manager, [facility]

## 2023-12-15 NOTE — PROGRESS NOTES
Bedside and Verbal shift change report given to Power Carlos Dyer (oncoming nurse) by Griselda Silos, RN (offgoing nurse). Report included the following information Nurse Handoff Report, Index, Intake/Output, MAR, Recent Results, Cardiac Rhythm sinus rhythm, Quality Measures, Neuro Assessment, and Event Log.

## 2023-12-15 NOTE — PROGRESS NOTES
Bedside and Verbal shift change report given to [address] [place] [address] (oncoming nurse) by Ellyn Chino (offgoing nurse). Report included the following information Nurse Handoff Report, MAR, Recent Results, Quality Measures, Neuro Assessment, and Event Log. Patient is tearful but verbalized.

## 2023-12-15 NOTE — PROGRESS NOTES
[facility]     Progress Note      12/15/2023 10:22 AM  NAME: Elyse Ballard   MRN:  [id number]   Admit Diagnosis: Pleural effusion [J90]  Acute respiratory failure with hypoxia ([address]) [J96.01]  CHF (congestive heart failure), NYHA class I, acute on chronic, combined ([address]) [I50.43]  Noninfected skin tear of right lower extremity, initial encounter [Q28.336E]                Assessment:       Recent hospitalization in 11/2203 for CHF, was not discharged on diuretic  Acute on chronic systolic chf     - No hx of CAD  - Echo 11/2014 EF 40%, moderate MR  - Chronic systolic congestive heart failuire with pleural effusions  - Sinus with LVH with RA  - HTN  - Dyslipidemia  - CKD with crcl of 35  - anemia with Hg of 7  - H/o of CVA with difficulty in speech  - No tobacco, etoh, no drugs  , lives in assisted living, uses a rollator  DNR     Daughter is [phone]                     Plan:        - No chest pain, hs troponin normal, at age 80 with her comorbidities poor candidate for invasive treatment, manage medically  - Increased pBNP with bilateral pleural effusions, will diurese  - Sinus     Recent Echo mod decreased EF     - Hold aspirin given anemia  - cont metoprolol  - Cont amlodipine  - Cot added lasix 40mg iv qd, follow bmp, O2 down to 2L  - Cont atorvastatin     Home once off O2. Needs lasix po at discharge    Discussed with Dr. Helder Ruiz. [x]       High complexity decision making was performed in this patient at high risk for decompensation with multiple organ involvement. We discussed the expected course, resolution and complications of the diagnoses in detail. Medication risk, benefits, costs, interactions, and alternatives were discussed as indicated. I advised him to contact the office if his condition worsens, changes or fails to improve as anticipated. Patient expressed understanding with the diagnoses  and plan.     Subjective:     Elyse Ballard denies chest

## 2023-12-15 NOTE — PLAN OF CARE
Orders received, chart reviewed and patient evaluated by physical therapy. Pending progression with skilled acute physical therapy, recommend:  Therapy 2 days/week in the home and also see \"other factors to consider\" below for additional discharge concerns/needs    Recommend with nursing OOB to chair 2x/day and walking daily with 2 person assist and rolling walker - work toward being able to walk to bathroom toilet. Thank you for completing as able in order to maintain patient strength, endurance and independence. Full evaluation to follow.       Roberto Carlos Shepard, PT

## 2023-12-16 LAB
ANION GAP SERPL CALC-SCNC: 7 MMOL/L (ref 5–15)
BASOPHILS # BLD: 0 K/UL (ref 0–0.1)
BASOPHILS NFR BLD: 0 % (ref 0–1)
BUN SERPL-MCNC: 32 MG/DL (ref 6–20)
BUN/CREAT SERPL: 24 (ref 12–20)
CALCIUM SERPL-MCNC: 8.3 MG/DL (ref 8.5–10.1)
CHLORIDE SERPL-SCNC: 94 MMOL/L (ref 97–108)
CO2 SERPL-SCNC: 31 MMOL/L (ref 21–32)
CREAT SERPL-MCNC: 1.33 MG/DL (ref 0.55–1.02)
DIFFERENTIAL METHOD BLD: ABNORMAL
EOSINOPHIL # BLD: 0.3 K/UL (ref 0–0.4)
EOSINOPHIL NFR BLD: 3 % (ref 0–7)
ERYTHROCYTE [DISTWIDTH] IN BLOOD BY AUTOMATED COUNT: 14.5 % (ref 11.5–14.5)
GLUCOSE SERPL-MCNC: 135 MG/DL (ref 65–100)
HCT VFR BLD AUTO: 21.6 % (ref 35–47)
HGB BLD-MCNC: 7.1 G/DL (ref 11.5–16)
IMM GRANULOCYTES # BLD AUTO: 0.1 K/UL (ref 0–0.04)
IMM GRANULOCYTES NFR BLD AUTO: 1 % (ref 0–0.5)
LYMPHOCYTES # BLD: 1.7 K/UL (ref 0.8–3.5)
LYMPHOCYTES NFR BLD: 17 % (ref 12–49)
MAGNESIUM SERPL-MCNC: 1.9 MG/DL (ref 1.6–2.4)
MCH RBC QN AUTO: 31.1 PG (ref 26–34)
MCHC RBC AUTO-ENTMCNC: 32.9 G/DL (ref 30–36.5)
MCV RBC AUTO: 94.7 FL (ref 80–99)
MONOCYTES # BLD: 1.3 K/UL (ref 0–1)
MONOCYTES NFR BLD: 12 % (ref 5–13)
NEUTS SEG # BLD: 6.9 K/UL (ref 1.8–8)
NEUTS SEG NFR BLD: 67 % (ref 32–75)
NRBC # BLD: 0 K/UL (ref 0–0.01)
NRBC BLD-RTO: 0 PER 100 WBC
NT PRO BNP: 7341 PG/ML
PLATELET # BLD AUTO: 273 K/UL (ref 150–400)
PMV BLD AUTO: 10.3 FL (ref 8.9–12.9)
POTASSIUM SERPL-SCNC: 3.9 MMOL/L (ref 3.5–5.1)
RBC # BLD AUTO: 2.28 M/UL (ref 3.8–5.2)
SODIUM SERPL-SCNC: 132 MMOL/L (ref 136–145)
WBC # BLD AUTO: 10.3 K/UL (ref 3.6–11)

## 2023-12-16 PROCEDURE — 94760 N-INVAS EAR/PLS OXIMETRY 1: CPT

## 2023-12-16 PROCEDURE — 1100000000 HC RM PRIVATE

## 2023-12-16 PROCEDURE — 85025 COMPLETE CBC W/AUTO DIFF WBC: CPT

## 2023-12-16 PROCEDURE — 36415 COLL VENOUS BLD VENIPUNCTURE: CPT

## 2023-12-16 PROCEDURE — 2580000003 HC RX 258: Performed by: INTERNAL MEDICINE

## 2023-12-16 PROCEDURE — 6370000000 HC RX 637 (ALT 250 FOR IP): Performed by: INTERNAL MEDICINE

## 2023-12-16 PROCEDURE — 6360000002 HC RX W HCPCS: Performed by: INTERNAL MEDICINE

## 2023-12-16 PROCEDURE — 6370000000 HC RX 637 (ALT 250 FOR IP): Performed by: NURSE PRACTITIONER

## 2023-12-16 PROCEDURE — 80048 BASIC METABOLIC PNL TOTAL CA: CPT

## 2023-12-16 PROCEDURE — 83735 ASSAY OF MAGNESIUM: CPT

## 2023-12-16 PROCEDURE — 2700000000 HC OXYGEN THERAPY PER DAY

## 2023-12-16 PROCEDURE — 83880 ASSAY OF NATRIURETIC PEPTIDE: CPT

## 2023-12-16 RX ADMIN — OXCARBAZEPINE 150 MG: 300 TABLET, FILM COATED ORAL at 08:31

## 2023-12-16 RX ADMIN — METOPROLOL SUCCINATE 25 MG: 25 TABLET, EXTENDED RELEASE ORAL at 08:31

## 2023-12-16 RX ADMIN — OXCARBAZEPINE 150 MG: 300 TABLET, FILM COATED ORAL at 22:39

## 2023-12-16 RX ADMIN — SODIUM CHLORIDE, PRESERVATIVE FREE 10 ML: 5 INJECTION INTRAVENOUS at 22:40

## 2023-12-16 RX ADMIN — ACETAMINOPHEN 650 MG: 325 TABLET ORAL at 08:30

## 2023-12-16 RX ADMIN — SODIUM CHLORIDE, PRESERVATIVE FREE 10 ML: 5 INJECTION INTRAVENOUS at 08:32

## 2023-12-16 RX ADMIN — PRAVASTATIN SODIUM 10 MG: 10 TABLET ORAL at 22:40

## 2023-12-16 RX ADMIN — FUROSEMIDE 40 MG: 10 INJECTION, SOLUTION INTRAMUSCULAR; INTRAVENOUS at 08:32

## 2023-12-16 RX ADMIN — AMLODIPINE BESYLATE 2.5 MG: 2.5 TABLET ORAL at 08:31

## 2023-12-16 ASSESSMENT — PAIN SCALES - GENERAL: PAINLEVEL_OUTOF10: 0

## 2023-12-16 NOTE — PROGRESS NOTES
[facility]     Progress Note      12/16/2023 1:48 PM  NAME: Cathy Jones   MRN:  [id number]   Admit Diagnosis: Pleural effusion [J90]  Acute respiratory failure with hypoxia (HCC) [J96.01]  CHF (congestive heart failure), NYHA class I, acute on chronic, combined (HCC) [I50.43]  Noninfected skin tear of right lower extremity, initial encounter [S81.743F]                Assessment:       Recent hospitalization in 11/2203 for CHF, was not discharged on diuretic  Acute on chronic systolic chf     - No hx of CAD  - Echo 11/2014 EF 40%, moderate MR  - Chronic systolic congestive heart failuire with pleural effusions  - Sinus with LVH with RA  - HTN  - Dyslipidemia  - CKD with crcl of 35  - anemia with Hg of 7  - 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  Needs lasix po at discharge    Discussed with Dr. Sneed.                [x]       High complexity decision making was performed in this patient at high risk for decompensation with multiple organ involvement.    We discussed the expected course, resolution and complications of the diagnoses in detail.  Medication risk, benefits, costs, interactions, and alternatives were discussed as indicated.  I advised him to contact the office if his condition worsens, changes or fails to improve as anticipated.  Patient expressed understanding with the diagnoses  and plan.    Subjective:     Cathy Jones denies chest  pain, dyspnea.  Discussed with RN events overnight.     Review of Systems:    Symptom Y/N Comments  Symptom Y/N Comments   Fever/Chills N   Chest Pain N    Poor Appetite N   Edema N    Cough N   Abdominal Pain N    Sputum N   Joint Pain N    SOB/CHAUHAN N   Pruritis/Rash N    Nausea/vomit N   Tolerating PT/OT Y    Diarrhea N   Tolerating Diet Y    Constipation N   Other       Could NOT obtain due to:      Objective:      Physical Exam:    Last 24hrs VS reviewed since prior progress note. Most recent are:    BP (!) 142/58   Pulse 87   Temp 100 °F (37.8 °C) (Axillary)   Resp 18   Ht 1.524 m (5')   Wt 64.4 kg (141 lb 15.6 oz)   SpO2 91%   BMI 27.73 kg/m²     Intake/Output Summary (Last 24 hours) at 12/16/2023 1348  Last data filed at 12/16/2023 1050  Gross per 24 hour   Intake 300 ml   Output 600 ml   Net -300 ml        General Appearance: Well developed, well nourished, alert & oriented x 3,    no acute distress.  Ears/Nose/Mouth/Throat: Hearing grossly normal.  Neck: Supple.  Chest: Lungs clear to auscultation bilaterally.  Cardiovascular: Regular rate and rhythm, S1S2 normal, no murmur.  Abdomen: Soft, non-tender, bowel sounds are active.  Extremities: No edema bilaterally.  Skin: Warm and dry.    PMH/SH reviewed - no change compared to H&P    Data Review    Telemetry: normal sinus rhythm     Lab Data Personally Reviewed:    Recent Labs     12/15/23  0507 12/16/23  0258   WBC 9.8 10.3   HGB 7.4* 7.1*   HCT 22.8* 21.6*    273     No results for input(s): \"INR\", \"APTT\" in the last 72 hours.    Invalid input(s): \"PTP\"     Recent Labs     12/14/23  0445 12/15/23  0507 12/16/23  0258    135* 132*   K 4.7 4.2 3.9    99 94*   CO2 25 30 31   BUN 22* 26* 32*   MG  --  1.9 1.9     No results for input(s): \"CPK\" in the last 72 hours.    Invalid input(s): \"CPKMB\", \"CKNDX\", \"TROIQ\"  Lab Results   Component Value Date/Time    CHOL 165 06/26/2023 12:26 PM    CHOL 154 06/22/2022 03:23 PM    HDL 69 06/26/2023  12:26 PM       No results for input(s): \"TP\", \"ALB\", \"GLOB\", \"GGT\", \"AML\" in the last 72 hours. Invalid input(s): \"SGOT\", \"GPT\", \"AP\", \"TBIL\", \"AMYP\", \"LPSE\", \"HLPSE\"    No results for input(s): \"PH\", \"PCO2\", \"PO2\" in the last 72 hours.     Medications Personally Reviewed:    Current Facility-Administered Medications   Medication Dose Route Frequency    furosemide (LASIX) injection 40 mg  40 mg IntraVENous Daily    ipratropium 0.5 mg-albuterol 2.5 mg (DUONEB) nebulizer solution 1 Dose  1 Dose Inhalation Q4H PRN    bacitracin ointment   Topical Once    sodium chloride flush 0.9 % injection 5-40 mL  5-40 mL IntraVENous 2 times per day    sodium chloride flush 0.9 % injection 5-40 mL  5-40 mL IntraVENous PRN    0.9 % sodium chloride infusion   IntraVENous PRN    [Held by provider] enoxaparin Sodium (LOVENOX) injection 30 mg  30 mg SubCUTAneous Daily    ondansetron (ZOFRAN-ODT) disintegrating tablet 4 mg  4 mg Oral Q8H PRN    Or    ondansetron (ZOFRAN) injection 4 mg  4 mg IntraVENous Q6H PRN    polyethylene glycol (GLYCOLAX) packet 17 g  17 g Oral Daily PRN    acetaminophen (TYLENOL) tablet 650 mg  650 mg Oral Q6H PRN    Or    acetaminophen (TYLENOL) suppository 650 mg  650 mg Rectal Q6H PRN    [Held by provider] aspirin chewable tablet 81 mg  81 mg Oral Daily    pravastatin (PRAVACHOL) tablet 10 mg  10 mg Oral Nightly    metoprolol succinate (TOPROL XL) extended release tablet 25 mg  25 mg Oral Daily    amLODIPine (NORVASC) tablet 2.5 mg  2.5 mg Oral Daily    OXcarbazepine (TRILEPTAL) tablet 150 mg  150 mg Oral BID         Suella Najjar, MD

## 2023-12-16 NOTE — PROGRESS NOTES
Bedside shift change report given to Davin King RN (oncoming nurse) by Dina Burdick RN (offgoing nurse). Report included the following information Nurse Handoff Report, Index, ED Encounter Summary, ED SBAR, Adult Overview, Intake/Output, MAR, Recent Results, Cardiac Rhythm NSR, Alarm Parameters, Quality Measures, and Neuro Assessment.

## 2023-12-17 LAB
ANION GAP SERPL CALC-SCNC: 6 MMOL/L (ref 5–15)
BASOPHILS # BLD: 0 K/UL (ref 0–0.1)
BASOPHILS NFR BLD: 0 % (ref 0–1)
BUN SERPL-MCNC: 38 MG/DL (ref 6–20)
BUN/CREAT SERPL: 27 (ref 12–20)
CALCIUM SERPL-MCNC: 8.4 MG/DL (ref 8.5–10.1)
CHLORIDE SERPL-SCNC: 94 MMOL/L (ref 97–108)
CO2 SERPL-SCNC: 30 MMOL/L (ref 21–32)
CREAT SERPL-MCNC: 1.39 MG/DL (ref 0.55–1.02)
DIFFERENTIAL METHOD BLD: ABNORMAL
EOSINOPHIL # BLD: 0.4 K/UL (ref 0–0.4)
EOSINOPHIL NFR BLD: 4 % (ref 0–7)
ERYTHROCYTE [DISTWIDTH] IN BLOOD BY AUTOMATED COUNT: 14.4 % (ref 11.5–14.5)
GLUCOSE BLD STRIP.AUTO-MCNC: 177 MG/DL (ref 65–117)
GLUCOSE SERPL-MCNC: 127 MG/DL (ref 65–100)
HCT VFR BLD AUTO: 21.1 % (ref 35–47)
HGB BLD-MCNC: 7.1 G/DL (ref 11.5–16)
IMM GRANULOCYTES # BLD AUTO: 0.1 K/UL (ref 0–0.04)
IMM GRANULOCYTES NFR BLD AUTO: 1 % (ref 0–0.5)
LYMPHOCYTES # BLD: 1.6 K/UL (ref 0.8–3.5)
LYMPHOCYTES NFR BLD: 17 % (ref 12–49)
MAGNESIUM SERPL-MCNC: 1.9 MG/DL (ref 1.6–2.4)
MCH RBC QN AUTO: 31.8 PG (ref 26–34)
MCHC RBC AUTO-ENTMCNC: 33.6 G/DL (ref 30–36.5)
MCV RBC AUTO: 94.6 FL (ref 80–99)
MONOCYTES # BLD: 1 K/UL (ref 0–1)
MONOCYTES NFR BLD: 11 % (ref 5–13)
NEUTS SEG # BLD: 6.2 K/UL (ref 1.8–8)
NEUTS SEG NFR BLD: 67 % (ref 32–75)
NRBC # BLD: 0 K/UL (ref 0–0.01)
NRBC BLD-RTO: 0 PER 100 WBC
NT PRO BNP: 6561 PG/ML
PLATELET # BLD AUTO: 290 K/UL (ref 150–400)
PMV BLD AUTO: 9.5 FL (ref 8.9–12.9)
POTASSIUM SERPL-SCNC: 4 MMOL/L (ref 3.5–5.1)
RBC # BLD AUTO: 2.23 M/UL (ref 3.8–5.2)
SERVICE CMNT-IMP: ABNORMAL
SODIUM SERPL-SCNC: 130 MMOL/L (ref 136–145)
WBC # BLD AUTO: 9.3 K/UL (ref 3.6–11)

## 2023-12-17 PROCEDURE — 2580000003 HC RX 258: Performed by: INTERNAL MEDICINE

## 2023-12-17 PROCEDURE — 6360000002 HC RX W HCPCS: Performed by: INTERNAL MEDICINE

## 2023-12-17 PROCEDURE — 85025 COMPLETE CBC W/AUTO DIFF WBC: CPT

## 2023-12-17 PROCEDURE — 82962 GLUCOSE BLOOD TEST: CPT

## 2023-12-17 PROCEDURE — 1100000000 HC RM PRIVATE

## 2023-12-17 PROCEDURE — 6370000000 HC RX 637 (ALT 250 FOR IP): Performed by: INTERNAL MEDICINE

## 2023-12-17 PROCEDURE — 6370000000 HC RX 637 (ALT 250 FOR IP): Performed by: NURSE PRACTITIONER

## 2023-12-17 PROCEDURE — 83880 ASSAY OF NATRIURETIC PEPTIDE: CPT

## 2023-12-17 PROCEDURE — 94760 N-INVAS EAR/PLS OXIMETRY 1: CPT

## 2023-12-17 PROCEDURE — 36415 COLL VENOUS BLD VENIPUNCTURE: CPT

## 2023-12-17 PROCEDURE — 2700000000 HC OXYGEN THERAPY PER DAY

## 2023-12-17 PROCEDURE — 80048 BASIC METABOLIC PNL TOTAL CA: CPT

## 2023-12-17 PROCEDURE — 83735 ASSAY OF MAGNESIUM: CPT

## 2023-12-17 RX ORDER — FUROSEMIDE 40 MG/1
40 TABLET ORAL DAILY
Status: DISCONTINUED | OUTPATIENT
Start: 2023-12-18 | End: 2023-12-22

## 2023-12-17 RX ADMIN — FUROSEMIDE 40 MG: 10 INJECTION, SOLUTION INTRAMUSCULAR; INTRAVENOUS at 09:50

## 2023-12-17 RX ADMIN — OXCARBAZEPINE 150 MG: 300 TABLET, FILM COATED ORAL at 09:50

## 2023-12-17 RX ADMIN — AMLODIPINE BESYLATE 2.5 MG: 2.5 TABLET ORAL at 09:50

## 2023-12-17 RX ADMIN — SODIUM CHLORIDE, PRESERVATIVE FREE 10 ML: 5 INJECTION INTRAVENOUS at 09:51

## 2023-12-17 RX ADMIN — METOPROLOL SUCCINATE 25 MG: 25 TABLET, EXTENDED RELEASE ORAL at 09:50

## 2023-12-17 RX ADMIN — SODIUM CHLORIDE, PRESERVATIVE FREE 10 ML: 5 INJECTION INTRAVENOUS at 21:22

## 2023-12-17 RX ADMIN — OXCARBAZEPINE 150 MG: 300 TABLET, FILM COATED ORAL at 21:22

## 2023-12-17 RX ADMIN — PRAVASTATIN SODIUM 10 MG: 10 TABLET ORAL at 21:22

## 2023-12-17 NOTE — PROGRESS NOTES
Bedside shift change report given to [address] (oncoming nurse) by Allie Galdamez RN (offgoing nurse). Report included the following information Nurse Handoff Report, Index, ED Encounter Summary, ED SBAR, Adult Overview, Surgery Report, Intake/Output, MAR, Recent Results, Med Rec Status, and Cardiac Rhythm (NSR) . Pt at bedside shift report was awake in bed. Pt current weight is 68.8kg.

## 2023-12-17 NOTE — PROGRESS NOTES
Bedside shift change report given to Bree Ron RN (oncoming nurse) by Caitlin Burgos RN (offgoing nurse). Report included the following information Nurse Handoff Report, Index, ED Encounter Summary, ED SBAR, Adult Overview, Surgery Report, Intake/Output, MAR, Recent Results, Cardiac Rhythm NSR, Alarm Parameters, Quality Measures, and Neuro Assessment.

## 2023-12-17 NOTE — PROGRESS NOTES
End of Shift Note    Bedside shift change report given to Becky Benoit RN (oncoming nurse) by SAM Zarate (offgoing nurse). Report included the following information SBAR, Recent Results, and Med Rec Status    Shift worked:  7am - 7pm     Shift summary and any significant changes:    Wound care provided this shift. Patient pleasant throughout shift with no complaint of pain. Patient ate majority of all meals today. Concerns for physician to address:  None     [facility] phone for oncoming shift:          Activity:     Number times ambulated in hallways past shift: 0  Number of times OOB to chair past shift: 0    Cardiac:   Cardiac Monitoring: Yes           Access:  \  Genitourinary:   Urinary status: voiding, external catheter    Respiratory:      Chronic home O2 use?: N/A  Incentive spirometer at bedside: NO       GI:     Current diet:  ADULT DIET; Easy to Chew  Passing flatus:    Tolerating current diet: YES       Pain Management:   Patient states pain is manageable on current regimen: N/A    Skin:     Interventions: specialty bed, float heels, and nutritional support    Patient Safety:  Fall Score:    Interventions: gripper socks       Length of Stay:  Expected LOS: 3  Actual LOS: [facility]

## 2023-12-18 LAB
ANION GAP SERPL CALC-SCNC: 5 MMOL/L (ref 5–15)
BASOPHILS # BLD: 0 K/UL (ref 0–0.1)
BASOPHILS NFR BLD: 1 % (ref 0–1)
BUN SERPL-MCNC: 41 MG/DL (ref 6–20)
BUN/CREAT SERPL: 32 (ref 12–20)
CALCIUM SERPL-MCNC: 8.4 MG/DL (ref 8.5–10.1)
CHLORIDE SERPL-SCNC: 96 MMOL/L (ref 97–108)
CO2 SERPL-SCNC: 31 MMOL/L (ref 21–32)
CREAT SERPL-MCNC: 1.29 MG/DL (ref 0.55–1.02)
DIFFERENTIAL METHOD BLD: ABNORMAL
EOSINOPHIL # BLD: 0.3 K/UL (ref 0–0.4)
EOSINOPHIL NFR BLD: 4 % (ref 0–7)
ERYTHROCYTE [DISTWIDTH] IN BLOOD BY AUTOMATED COUNT: 14.3 % (ref 11.5–14.5)
GLUCOSE SERPL-MCNC: 133 MG/DL (ref 65–100)
HCT VFR BLD AUTO: 22.8 % (ref 35–47)
HGB BLD-MCNC: 7.5 G/DL (ref 11.5–16)
IMM GRANULOCYTES # BLD AUTO: 0.1 K/UL (ref 0–0.04)
IMM GRANULOCYTES NFR BLD AUTO: 1 % (ref 0–0.5)
LYMPHOCYTES # BLD: 1.5 K/UL (ref 0.8–3.5)
LYMPHOCYTES NFR BLD: 18 % (ref 12–49)
MAGNESIUM SERPL-MCNC: 2 MG/DL (ref 1.6–2.4)
MCH RBC QN AUTO: 31.5 PG (ref 26–34)
MCHC RBC AUTO-ENTMCNC: 32.9 G/DL (ref 30–36.5)
MCV RBC AUTO: 95.8 FL (ref 80–99)
MONOCYTES # BLD: 1 K/UL (ref 0–1)
MONOCYTES NFR BLD: 12 % (ref 5–13)
NEUTS SEG # BLD: 5.6 K/UL (ref 1.8–8)
NEUTS SEG NFR BLD: 64 % (ref 32–75)
NRBC # BLD: 0 K/UL (ref 0–0.01)
NRBC BLD-RTO: 0 PER 100 WBC
NT PRO BNP: 4752 PG/ML
PLATELET # BLD AUTO: 336 K/UL (ref 150–400)
PMV BLD AUTO: 9.6 FL (ref 8.9–12.9)
POTASSIUM SERPL-SCNC: 4 MMOL/L (ref 3.5–5.1)
RBC # BLD AUTO: 2.38 M/UL (ref 3.8–5.2)
SODIUM SERPL-SCNC: 132 MMOL/L (ref 136–145)
WBC # BLD AUTO: 8.5 K/UL (ref 3.6–11)

## 2023-12-18 PROCEDURE — 6360000002 HC RX W HCPCS: Performed by: INTERNAL MEDICINE

## 2023-12-18 PROCEDURE — 80048 BASIC METABOLIC PNL TOTAL CA: CPT

## 2023-12-18 PROCEDURE — 2580000003 HC RX 258: Performed by: INTERNAL MEDICINE

## 2023-12-18 PROCEDURE — 83880 ASSAY OF NATRIURETIC PEPTIDE: CPT

## 2023-12-18 PROCEDURE — 36415 COLL VENOUS BLD VENIPUNCTURE: CPT

## 2023-12-18 PROCEDURE — 85025 COMPLETE CBC W/AUTO DIFF WBC: CPT

## 2023-12-18 PROCEDURE — 6370000000 HC RX 637 (ALT 250 FOR IP): Performed by: INTERNAL MEDICINE

## 2023-12-18 PROCEDURE — 1100000000 HC RM PRIVATE

## 2023-12-18 PROCEDURE — 97110 THERAPEUTIC EXERCISES: CPT

## 2023-12-18 PROCEDURE — 94760 N-INVAS EAR/PLS OXIMETRY 1: CPT

## 2023-12-18 PROCEDURE — 2700000000 HC OXYGEN THERAPY PER DAY

## 2023-12-18 PROCEDURE — 6370000000 HC RX 637 (ALT 250 FOR IP): Performed by: NURSE PRACTITIONER

## 2023-12-18 PROCEDURE — 83735 ASSAY OF MAGNESIUM: CPT

## 2023-12-18 PROCEDURE — 97116 GAIT TRAINING THERAPY: CPT

## 2023-12-18 RX ADMIN — SODIUM CHLORIDE, PRESERVATIVE FREE 10 ML: 5 INJECTION INTRAVENOUS at 09:20

## 2023-12-18 RX ADMIN — OXCARBAZEPINE 150 MG: 300 TABLET, FILM COATED ORAL at 21:00

## 2023-12-18 RX ADMIN — PRAVASTATIN SODIUM 10 MG: 10 TABLET ORAL at 21:34

## 2023-12-18 RX ADMIN — SODIUM CHLORIDE, PRESERVATIVE FREE 10 ML: 5 INJECTION INTRAVENOUS at 21:34

## 2023-12-18 RX ADMIN — OXCARBAZEPINE 150 MG: 300 TABLET, FILM COATED ORAL at 09:19

## 2023-12-18 RX ADMIN — SODIUM CHLORIDE 1000 MG: 900 INJECTION INTRAVENOUS at 11:45

## 2023-12-18 RX ADMIN — FUROSEMIDE 40 MG: 40 TABLET ORAL at 09:19

## 2023-12-18 RX ADMIN — METOPROLOL SUCCINATE 25 MG: 25 TABLET, EXTENDED RELEASE ORAL at 09:19

## 2023-12-18 RX ADMIN — AMLODIPINE BESYLATE 2.5 MG: 2.5 TABLET ORAL at 09:19

## 2023-12-18 ASSESSMENT — PAIN SCALES - GENERAL: PAINLEVEL_OUTOF10: 0

## 2023-12-18 NOTE — PLAN OF CARE
Problem: Physical Therapy - Adult  Goal: By Discharge: Performs mobility at highest level of function for planned discharge setting. See evaluation for individualized goals. Description: FUNCTIONAL STATUS PRIOR TO ADMISSION: Patient was cg assistance using a rollator for functional mobility. and The patient  required moderate assistance for basic and instrumental ADLs. HOME SUPPORT PRIOR TO ADMISSION: The patient lived alone with paid help at [facility] and required moderate assistance for dressing and bathing. Physical Therapy Goals  Initiated 12/15/2023  1. Patient will move from supine to sit and sit to supine, scoot up and down, and roll side to side in bed with minimal assistance within 7 day(s). 2.  Patient will perform sit to stand with minimal assistance within 7 day(s). 3.  Patient will transfer from bed to chair and chair to bed with contact guard assist using the least restrictive device within 7 day(s). 4.  Patient will ambulate with contact guard assist for 35 feet with the least restrictive device within 7 day(s). Outcome: Progressing   PHYSICAL THERAPY TREATMENT    Patient: Stormy George (20 y.o. female)  Date: 12/18/2023  Diagnosis: Pleural effusion [J90]  Acute respiratory failure with hypoxia (HCC) [J96.01]  CHF (congestive heart failure), NYHA class I, acute on chronic, combined (HCC) [I50.43]  Noninfected skin tear of right lower extremity, initial encounter [S81.511A] CHF (congestive heart failure), NYHA class I, acute on chronic, combined ([address])      Precautions: Fall Risk, Up as Tolerated, Bed Alarm                    ASSESSMENT:  Pt tolerated PT services well and continues to progress toward PT POC goals. With increased time/effort, most tasks performed with Roman/modA including bed mobility, functional transfers and ambulation ~ 4ft with RW. Pt is a poor historian at baseline, but confirms ambulatory at baseline.  Per medical chart, Pt resides at [facility] with underlying h/o

## 2023-12-18 NOTE — PROGRESS NOTES
Bedside shift change report given to [address] (oncoming nurse) by Sarah Barakat RN (offgoing nurse). Report included the following information Nurse Handoff Report, Index, ED Encounter Summary, ED SBAR, Adult Overview, Surgery Report, Intake/Output, MAR, Recent Results, Med Rec Status, and Cardiac Rhythm (NSR) . Pt at bedside shift report was awake in bed.

## 2023-12-19 LAB
ANION GAP SERPL CALC-SCNC: 5 MMOL/L (ref 5–15)
BASOPHILS # BLD: 0.1 K/UL (ref 0–0.1)
BASOPHILS NFR BLD: 1 % (ref 0–1)
BUN SERPL-MCNC: 39 MG/DL (ref 6–20)
BUN/CREAT SERPL: 27 (ref 12–20)
CALCIUM SERPL-MCNC: 8.6 MG/DL (ref 8.5–10.1)
CHLORIDE SERPL-SCNC: 98 MMOL/L (ref 97–108)
CO2 SERPL-SCNC: 30 MMOL/L (ref 21–32)
CREAT SERPL-MCNC: 1.42 MG/DL (ref 0.55–1.02)
DIFFERENTIAL METHOD BLD: ABNORMAL
EOSINOPHIL # BLD: 0.4 K/UL (ref 0–0.4)
EOSINOPHIL NFR BLD: 5 % (ref 0–7)
ERYTHROCYTE [DISTWIDTH] IN BLOOD BY AUTOMATED COUNT: 14.4 % (ref 11.5–14.5)
GLUCOSE BLD STRIP.AUTO-MCNC: 143 MG/DL (ref 65–117)
GLUCOSE SERPL-MCNC: 121 MG/DL (ref 65–100)
HCT VFR BLD AUTO: 23.2 % (ref 35–47)
HGB BLD-MCNC: 7.4 G/DL (ref 11.5–16)
IMM GRANULOCYTES # BLD AUTO: 0.1 K/UL (ref 0–0.04)
IMM GRANULOCYTES NFR BLD AUTO: 1 % (ref 0–0.5)
LYMPHOCYTES # BLD: 1.7 K/UL (ref 0.8–3.5)
LYMPHOCYTES NFR BLD: 19 % (ref 12–49)
MCH RBC QN AUTO: 30.3 PG (ref 26–34)
MCHC RBC AUTO-ENTMCNC: 31.9 G/DL (ref 30–36.5)
MCV RBC AUTO: 95.1 FL (ref 80–99)
MONOCYTES # BLD: 1.2 K/UL (ref 0–1)
MONOCYTES NFR BLD: 13 % (ref 5–13)
NEUTS SEG # BLD: 5.6 K/UL (ref 1.8–8)
NEUTS SEG NFR BLD: 62 % (ref 32–75)
NRBC # BLD: 0 K/UL (ref 0–0.01)
NRBC BLD-RTO: 0 PER 100 WBC
PLATELET # BLD AUTO: 360 K/UL (ref 150–400)
PMV BLD AUTO: 9.8 FL (ref 8.9–12.9)
POTASSIUM SERPL-SCNC: 3.8 MMOL/L (ref 3.5–5.1)
RBC # BLD AUTO: 2.44 M/UL (ref 3.8–5.2)
SERVICE CMNT-IMP: ABNORMAL
SODIUM SERPL-SCNC: 133 MMOL/L (ref 136–145)
WBC # BLD AUTO: 9 K/UL (ref 3.6–11)

## 2023-12-19 PROCEDURE — 6370000000 HC RX 637 (ALT 250 FOR IP): Performed by: INTERNAL MEDICINE

## 2023-12-19 PROCEDURE — 80048 BASIC METABOLIC PNL TOTAL CA: CPT

## 2023-12-19 PROCEDURE — 1100000000 HC RM PRIVATE

## 2023-12-19 PROCEDURE — 2580000003 HC RX 258: Performed by: INTERNAL MEDICINE

## 2023-12-19 PROCEDURE — 2700000000 HC OXYGEN THERAPY PER DAY

## 2023-12-19 PROCEDURE — 6370000000 HC RX 637 (ALT 250 FOR IP): Performed by: NURSE PRACTITIONER

## 2023-12-19 PROCEDURE — 85025 COMPLETE CBC W/AUTO DIFF WBC: CPT

## 2023-12-19 PROCEDURE — 82962 GLUCOSE BLOOD TEST: CPT

## 2023-12-19 PROCEDURE — 94760 N-INVAS EAR/PLS OXIMETRY 1: CPT

## 2023-12-19 PROCEDURE — 6360000002 HC RX W HCPCS: Performed by: INTERNAL MEDICINE

## 2023-12-19 PROCEDURE — 36415 COLL VENOUS BLD VENIPUNCTURE: CPT

## 2023-12-19 RX ADMIN — METOPROLOL SUCCINATE 25 MG: 25 TABLET, EXTENDED RELEASE ORAL at 08:40

## 2023-12-19 RX ADMIN — OXCARBAZEPINE 150 MG: 300 TABLET, FILM COATED ORAL at 20:30

## 2023-12-19 RX ADMIN — SODIUM CHLORIDE, PRESERVATIVE FREE 10 ML: 5 INJECTION INTRAVENOUS at 08:43

## 2023-12-19 RX ADMIN — OXCARBAZEPINE 150 MG: 300 TABLET, FILM COATED ORAL at 08:40

## 2023-12-19 RX ADMIN — FUROSEMIDE 40 MG: 40 TABLET ORAL at 08:40

## 2023-12-19 RX ADMIN — PRAVASTATIN SODIUM 10 MG: 10 TABLET ORAL at 20:30

## 2023-12-19 RX ADMIN — SODIUM CHLORIDE, PRESERVATIVE FREE 10 ML: 5 INJECTION INTRAVENOUS at 20:35

## 2023-12-19 RX ADMIN — AMLODIPINE BESYLATE 2.5 MG: 2.5 TABLET ORAL at 08:40

## 2023-12-19 RX ADMIN — SODIUM CHLORIDE 1000 MG: 900 INJECTION INTRAVENOUS at 08:48

## 2023-12-19 NOTE — PROGRESS NOTES
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  - Sinus with LVH with RA  - HTN  - Dyslipidemia  - CKD with crcl of 35  - anemia with Hg of 7  - 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 Needs lasix po at discharge    Discussed with Dr. Osman Blanca. [x]       High complexity decision making was performed in this patient at high risk for decompensation with multiple organ involvement. We discussed the expected course, resolution and complications of the diagnoses in detail. Medication risk, benefits, costs, interactions, and alternatives were discussed as indicated. I advised him to contact the office if his condition worsens, changes or fails to improve as anticipated. Patient expressed understanding with the diagnoses  and plan.     Subjective:     Nuzhat Rodriguez

## 2023-12-19 NOTE — PROGRESS NOTES
Bedside and Verbal shift change report given to Chen Samuels (oncoming nurse) by Marisa Hernandez (offgoing nurse). Report included the following information Intake/Output, MAR, Recent Results, and Med Rec Status.

## 2023-12-20 LAB
ANION GAP SERPL CALC-SCNC: 8 MMOL/L (ref 5–15)
BASOPHILS # BLD: 0.1 K/UL (ref 0–0.1)
BASOPHILS NFR BLD: 1 % (ref 0–1)
BUN SERPL-MCNC: 39 MG/DL (ref 6–20)
BUN/CREAT SERPL: 30 (ref 12–20)
CALCIUM SERPL-MCNC: 8.9 MG/DL (ref 8.5–10.1)
CHLORIDE SERPL-SCNC: 98 MMOL/L (ref 97–108)
CO2 SERPL-SCNC: 26 MMOL/L (ref 21–32)
CREAT SERPL-MCNC: 1.29 MG/DL (ref 0.55–1.02)
DIFFERENTIAL METHOD BLD: ABNORMAL
EOSINOPHIL # BLD: 0.1 K/UL (ref 0–0.4)
EOSINOPHIL NFR BLD: 1 % (ref 0–7)
ERYTHROCYTE [DISTWIDTH] IN BLOOD BY AUTOMATED COUNT: 14.5 % (ref 11.5–14.5)
GLUCOSE SERPL-MCNC: 155 MG/DL (ref 65–100)
HCT VFR BLD AUTO: 27.6 % (ref 35–47)
HGB BLD-MCNC: 8.6 G/DL (ref 11.5–16)
IMM GRANULOCYTES # BLD AUTO: 0.1 K/UL (ref 0–0.04)
IMM GRANULOCYTES NFR BLD AUTO: 1 % (ref 0–0.5)
LYMPHOCYTES # BLD: 1.3 K/UL (ref 0.8–3.5)
LYMPHOCYTES NFR BLD: 11 % (ref 12–49)
MCH RBC QN AUTO: 31 PG (ref 26–34)
MCHC RBC AUTO-ENTMCNC: 31.2 G/DL (ref 30–36.5)
MCV RBC AUTO: 99.6 FL (ref 80–99)
MONOCYTES # BLD: 1.4 K/UL (ref 0–1)
MONOCYTES NFR BLD: 11 % (ref 5–13)
NEUTS SEG # BLD: 9.5 K/UL (ref 1.8–8)
NEUTS SEG NFR BLD: 76 % (ref 32–75)
NRBC # BLD: 0 K/UL (ref 0–0.01)
NRBC BLD-RTO: 0 PER 100 WBC
PLATELET # BLD AUTO: 392 K/UL (ref 150–400)
PMV BLD AUTO: 9.8 FL (ref 8.9–12.9)
POTASSIUM SERPL-SCNC: 4.7 MMOL/L (ref 3.5–5.1)
RBC # BLD AUTO: 2.77 M/UL (ref 3.8–5.2)
SODIUM SERPL-SCNC: 132 MMOL/L (ref 136–145)
WBC # BLD AUTO: 12.5 K/UL (ref 3.6–11)

## 2023-12-20 PROCEDURE — 1100000000 HC RM PRIVATE

## 2023-12-20 PROCEDURE — 2580000003 HC RX 258: Performed by: INTERNAL MEDICINE

## 2023-12-20 PROCEDURE — 85025 COMPLETE CBC W/AUTO DIFF WBC: CPT

## 2023-12-20 PROCEDURE — 80048 BASIC METABOLIC PNL TOTAL CA: CPT

## 2023-12-20 PROCEDURE — 94760 N-INVAS EAR/PLS OXIMETRY 1: CPT

## 2023-12-20 PROCEDURE — 6370000000 HC RX 637 (ALT 250 FOR IP): Performed by: INTERNAL MEDICINE

## 2023-12-20 PROCEDURE — 6360000002 HC RX W HCPCS: Performed by: INTERNAL MEDICINE

## 2023-12-20 PROCEDURE — 2700000000 HC OXYGEN THERAPY PER DAY

## 2023-12-20 PROCEDURE — 99222 1ST HOSP IP/OBS MODERATE 55: CPT | Performed by: NURSE PRACTITIONER

## 2023-12-20 PROCEDURE — 36415 COLL VENOUS BLD VENIPUNCTURE: CPT

## 2023-12-20 PROCEDURE — 6370000000 HC RX 637 (ALT 250 FOR IP): Performed by: NURSE PRACTITIONER

## 2023-12-20 RX ORDER — LANOLIN ALCOHOL/MO/W.PET/CERES
3 CREAM (GRAM) TOPICAL NIGHTLY PRN
Status: DISCONTINUED | OUTPATIENT
Start: 2023-12-20 | End: 2023-12-26 | Stop reason: HOSPADM

## 2023-12-20 RX ADMIN — FUROSEMIDE 40 MG: 40 TABLET ORAL at 10:23

## 2023-12-20 RX ADMIN — AMLODIPINE BESYLATE 2.5 MG: 2.5 TABLET ORAL at 10:23

## 2023-12-20 RX ADMIN — PRAVASTATIN SODIUM 10 MG: 10 TABLET ORAL at 19:55

## 2023-12-20 RX ADMIN — OXCARBAZEPINE 150 MG: 300 TABLET, FILM COATED ORAL at 19:55

## 2023-12-20 RX ADMIN — SODIUM CHLORIDE, PRESERVATIVE FREE 10 ML: 5 INJECTION INTRAVENOUS at 10:23

## 2023-12-20 RX ADMIN — SODIUM CHLORIDE 1000 MG: 900 INJECTION INTRAVENOUS at 10:24

## 2023-12-20 RX ADMIN — METOPROLOL SUCCINATE 25 MG: 25 TABLET, EXTENDED RELEASE ORAL at 10:23

## 2023-12-20 RX ADMIN — SODIUM CHLORIDE, PRESERVATIVE FREE 10 ML: 5 INJECTION INTRAVENOUS at 19:56

## 2023-12-20 RX ADMIN — OXCARBAZEPINE 150 MG: 300 TABLET, FILM COATED ORAL at 10:23

## 2023-12-20 NOTE — PROGRESS NOTES
Physical Therapy    Arrived to see pt for session. RN states that leg wound is showing signs of skin  from sutures and currently surgery consult being made to evaluate if anything needs to be done to stabilize. Agreed with RN that placing leg in dependent position or WB right now would not be advised. Will defer and follow up tomorrow.     Daniel Mauro PT

## 2023-12-20 NOTE — PROGRESS NOTES
End of Shift Note    Bedside shift change report given to Baljit Quezada RN (oncoming nurse) by Crispin Ferrell LPN (offgoing nurse). Report included the following information SBAR, Kardex, Intake/Output, MAR, Accordion, Recent Results, Med Rec Status, and Cardiac Rhythm NSR    Shift worked:  [id number]     Shift summary and any significant changes:     Labs collected. Wound car epreformed at 0000. No complaints of pain during shift. No complaints at the time of shift change. Plan of care ongoing. Concerns for physician to address:  N/A     [facility] phone for oncoming shift:   [phone]       Activity:     Number times ambulated in hallways past shift: 0  Number of times OOB to chair past shift: 0    Cardiac:   Cardiac Monitoring: Yes           Access:  Current line(s): PIV     Genitourinary:   Urinary status: external catheter    Respiratory:      Chronic home O2 use?: NO  Incentive spirometer at bedside: YES       GI:     Current diet:  ADULT DIET; Easy to Chew  Passing flatus: YES  Tolerating current diet: YES       Pain Management:   Patient states pain is manageable on current regimen: YES    Skin:     Interventions: turn team, float heels, PT/OT consult, limit briefs, and internal/external urinary devices    Patient Safety:  Fall Score:    Interventions: bed/chair alarm, assistive device (walker, cane.  etc), gripper socks, pt to call before getting OOB, and stay with me (per policy)       Length of Stay:  Expected LOS: 3  Actual LOS: 6      Crispin Ferrell LPN

## 2023-12-20 NOTE — PROGRESS NOTES
Hospitalist Progress Note    NAME: Cathy Jones   : 1927   MRN: [id number]     Date/Time: 2023 3:30 PM  Patient PCP: RADHA Mcnamara IV, MD    Estimated discharge date:    Anticipated Disposition / Barriers: Improvement of shortness of breath, O2 challenge, improvement of right leg wound    Assessment / Plan:     Acute hypoxic respiratory failure due to HFrEF  HTN / HLD  -CTA chest:  no PE.  Moderate pleural effusions with adjacent atelectasis and mild pulmonary  edema, increased compared to 2023.  -Echo EF 40-45%, mod MR  -NT proBNP 13K  -Changed to p.o. Lasix  -continue metoprolol and statin   -cardiology recommendations noted  -Wean O2 as tolerated, she is currently on 1 L    RLE hematoma / skin tear requiring sutures  S/P fall  -holding asa and lovenox - hg is now stable at 8.6  -Her right leg wound was opened this morning and there is evidence of skin giving away when widening of the  -Will reconsult wound care  -Will consult with general surgery as well  -sutures needs to be removed when appropriate  -Will need follow-up with [facility] wound care physician upon discharge    Hyponatremia  -Sodium is improving with IV diuresis.  Sodium is 133.  Check BMP in a.m.    Acute blood loss anemia from hematoma  -Presented with presented with a hemoglobin of 8.3.  Hemoglobin is now close to baseline    Proteus mirabilis urinary tract infection  -S/p completion of Rocephin      Medical Decision Making:   I personally reviewed labs: Hemoglobin due to anemia  I personally reviewed imaging:  Toxic drug monitoring:   Discussed case with: Patient's daughter, pharmacy and     Central Line:      Code status: DNR  Prophylaxis: Lovenox - held due to anemia    Subjective:     Chief Complaint / Reason for Physician Visit  Shortness of breath is better  She reports pain in the right leg wound  No fever  Overnight events noted      Objective:     VITALS:   Most recent are:  Visit Vitals  BP  labs and imaging studies.   Pertinent labs include:  Recent Labs     12/18/23  0244 12/19/23  0252 12/20/23  0236   WBC 8.5 9.0 12.5*   HGB 7.5* 7.4* 8.6*   HCT 22.8* 23.2* 27.6*    360 392       Recent Labs     12/18/23  0244 12/19/23 0252 12/20/23  0046   * 133* 132*   K 4.0 3.8 4.7   CL 96* 98 98   CO2 31 30 26   BUN 41* 39* 39*   MG 2.0  --   --

## 2023-12-21 LAB
ANION GAP SERPL CALC-SCNC: 5 MMOL/L (ref 5–15)
BASOPHILS # BLD: 0 K/UL (ref 0–0.1)
BASOPHILS NFR BLD: 0 % (ref 0–1)
BUN SERPL-MCNC: 41 MG/DL (ref 6–20)
BUN/CREAT SERPL: 31 (ref 12–20)
CALCIUM SERPL-MCNC: 9 MG/DL (ref 8.5–10.1)
CHLORIDE SERPL-SCNC: 97 MMOL/L (ref 97–108)
CO2 SERPL-SCNC: 30 MMOL/L (ref 21–32)
CREAT SERPL-MCNC: 1.31 MG/DL (ref 0.55–1.02)
DIFFERENTIAL METHOD BLD: ABNORMAL
EOSINOPHIL # BLD: 0 K/UL (ref 0–0.4)
EOSINOPHIL NFR BLD: 0 % (ref 0–7)
ERYTHROCYTE [DISTWIDTH] IN BLOOD BY AUTOMATED COUNT: 14.6 % (ref 11.5–14.5)
GLUCOSE SERPL-MCNC: 152 MG/DL (ref 65–100)
HCT VFR BLD AUTO: 26.2 % (ref 35–47)
HGB BLD-MCNC: 8.2 G/DL (ref 11.5–16)
IMM GRANULOCYTES # BLD AUTO: 0.2 K/UL (ref 0–0.04)
IMM GRANULOCYTES NFR BLD AUTO: 1 % (ref 0–0.5)
LYMPHOCYTES # BLD: 1.8 K/UL (ref 0.8–3.5)
LYMPHOCYTES NFR BLD: 10 % (ref 12–49)
MCH RBC QN AUTO: 30.8 PG (ref 26–34)
MCHC RBC AUTO-ENTMCNC: 31.3 G/DL (ref 30–36.5)
MCV RBC AUTO: 98.5 FL (ref 80–99)
MONOCYTES # BLD: 2.1 K/UL (ref 0–1)
MONOCYTES NFR BLD: 12 % (ref 5–13)
NEUTS SEG # BLD: 13.5 K/UL (ref 1.8–8)
NEUTS SEG NFR BLD: 77 % (ref 32–75)
NRBC # BLD: 0 K/UL (ref 0–0.01)
NRBC BLD-RTO: 0 PER 100 WBC
PLATELET # BLD AUTO: 414 K/UL (ref 150–400)
PMV BLD AUTO: 9.7 FL (ref 8.9–12.9)
POTASSIUM SERPL-SCNC: 4.2 MMOL/L (ref 3.5–5.1)
RBC # BLD AUTO: 2.66 M/UL (ref 3.8–5.2)
RBC MORPH BLD: ABNORMAL
SODIUM SERPL-SCNC: 132 MMOL/L (ref 136–145)
WBC # BLD AUTO: 17.6 K/UL (ref 3.6–11)

## 2023-12-21 PROCEDURE — 36415 COLL VENOUS BLD VENIPUNCTURE: CPT

## 2023-12-21 PROCEDURE — 97163 PT EVAL HIGH COMPLEX 45 MIN: CPT

## 2023-12-21 PROCEDURE — 6370000000 HC RX 637 (ALT 250 FOR IP): Performed by: NURSE PRACTITIONER

## 2023-12-21 PROCEDURE — 1100000000 HC RM PRIVATE

## 2023-12-21 PROCEDURE — 97110 THERAPEUTIC EXERCISES: CPT

## 2023-12-21 PROCEDURE — 2700000000 HC OXYGEN THERAPY PER DAY

## 2023-12-21 PROCEDURE — 97530 THERAPEUTIC ACTIVITIES: CPT

## 2023-12-21 PROCEDURE — 6370000000 HC RX 637 (ALT 250 FOR IP): Performed by: INTERNAL MEDICINE

## 2023-12-21 PROCEDURE — 94760 N-INVAS EAR/PLS OXIMETRY 1: CPT

## 2023-12-21 PROCEDURE — 2580000003 HC RX 258: Performed by: INTERNAL MEDICINE

## 2023-12-21 PROCEDURE — 85025 COMPLETE CBC W/AUTO DIFF WBC: CPT

## 2023-12-21 PROCEDURE — 87040 BLOOD CULTURE FOR BACTERIA: CPT

## 2023-12-21 PROCEDURE — 80048 BASIC METABOLIC PNL TOTAL CA: CPT

## 2023-12-21 PROCEDURE — 6360000002 HC RX W HCPCS: Performed by: INTERNAL MEDICINE

## 2023-12-21 RX ADMIN — OXCARBAZEPINE 150 MG: 300 TABLET, FILM COATED ORAL at 21:21

## 2023-12-21 RX ADMIN — AMPICILLIN SODIUM AND SULBACTAM SODIUM 3000 MG: 2; 1 INJECTION, POWDER, FOR SOLUTION INTRAMUSCULAR; INTRAVENOUS at 19:07

## 2023-12-21 RX ADMIN — MELATONIN 3 MG: at 21:21

## 2023-12-21 RX ADMIN — VANCOMYCIN HYDROCHLORIDE 1500 MG: 1.5 INJECTION, POWDER, LYOPHILIZED, FOR SOLUTION INTRAVENOUS at 19:47

## 2023-12-21 RX ADMIN — PRAVASTATIN SODIUM 10 MG: 10 TABLET ORAL at 21:21

## 2023-12-21 RX ADMIN — SODIUM CHLORIDE, PRESERVATIVE FREE 10 ML: 5 INJECTION INTRAVENOUS at 21:21

## 2023-12-21 RX ADMIN — OXCARBAZEPINE 150 MG: 300 TABLET, FILM COATED ORAL at 11:04

## 2023-12-21 RX ADMIN — SODIUM CHLORIDE, PRESERVATIVE FREE 10 ML: 5 INJECTION INTRAVENOUS at 11:05

## 2023-12-21 RX ADMIN — METOPROLOL SUCCINATE 25 MG: 25 TABLET, EXTENDED RELEASE ORAL at 11:04

## 2023-12-21 RX ADMIN — AMLODIPINE BESYLATE 2.5 MG: 2.5 TABLET ORAL at 11:04

## 2023-12-21 RX ADMIN — FUROSEMIDE 40 MG: 40 TABLET ORAL at 11:04

## 2023-12-21 NOTE — PROGRESS NOTES
Spiritual Care Assessment/Progress Note  Celine    Name: Jennifer Mays MRN: [id number]    Age: 80 y.o. Sex: female   Language: English     Date: 12/21/2023            Total Time Calculated: 11 min              Spiritual Assessment begun in [facility] 3 MED TELE  Service Provided For[de-identified] Patient not available  Referral/Consult From[de-identified] Rounding  Encounter Overview/Reason : Attempted Encounter    Spiritual beliefs:      [] Involved in a isaiah tradition/spiritual practice:      [] Supported by a isaiah community:      [] Claims no spiritual orientation:      [] Seeking spiritual identity:           [] Adheres to an individual form of spirituality:      [x] Not able to assess:                Identified resources for coping and support system:   Support System: Unknown       [] Prayer                  [] Devotional reading               [] Music                  [] Guided Imagery     [] Pet visits                                        [] Other: (COMMENT)     Specific area/focus of visit   Encounter:    Crisis:    Spiritual/Emotional needs:    Ritual, Rites and Sacraments:    Grief, Loss, and Adjustments:    Ethics/Mediation:    Behavioral Health:    Palliative Care: Advance Care Planning:      Plan/Referrals: Continue to visit, (comment)    Narrative:   Reviewed chart prior to visit on Med Tele for spiritual assessment. No family/friends present. Ms Carrasquillo Storrs appeared to be resting. Unable to assess for spiritual needs or concerns.    available upon referral by staff or by patient/family request.    CHRISTY Sandoval, [facility], Staff   ADDIS CACERES [facility] Paging Service  [phone]

## 2023-12-21 NOTE — PROGRESS NOTES
Comprehensive Nutrition Assessment    Type and Reason for Visit:  Initial    Nutrition Recommendations/Plan:   Diet as tolerated. Consider CC restrictions at pt is diabetic     Malnutrition Assessment:  No malnutrition. Nutrition Assessment:    Pt admitted with R leg lac with sutures, blood loss from wound; fall from bed @ nursing home. Pt c/o R leg pain, sutures coming out and MD has plans for them to be removed. 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 Nutrition Related Findings:    Pt tolerating ETC diet; Meds: lasix. statin Wound Type: Stage II, Open Wounds (R leg lac from fall from bed)       Current Nutrition Intake & Therapies:    Average Meal Intake: %  Average Supplements Intake: None Ordered  ADULT DIET; Easy to Chew    Anthropometric Measures:  Height: 152.4 cm (5')  Ideal Body Weight (IBW): 100 lbs (45 kg)       Current Body Weight: 67.5 kg (148 lb 13 oz), 148.8 % IBW. Weight Source: Bed Scale  Current BMI (kg/m2): 29.1        Weight Adjustment For: No Adjustment                 BMI Categories: Overweight (BMI 25.0-29. 9)    Estimated Daily Nutrient Needs:  Energy Requirements Based On: Formula  Weight Used for Energy Requirements: Current  Energy (kcal/day): 1785  Weight Used for Protein Requirements: Current  Protein (g/day): 74  Method Used for Fluid Requirements: 1 ml/kcal  Fluid (ml/day): 1800    Nutrition Diagnosis:   Overweight/Obese related to cognitive or neurological impairment as evidenced by poor dentition    Nutrition Interventions:   Food and/or Nutrient Delivery: Modify Current Diet (ETC with diabetic restrictions)  Nutrition Education/Counseling: No recommendation at this time  Coordination of Nutrition Care: Continue to monitor while inpatient       Goals:  Previous Goal Met: Progressing toward Goal(s)  Goals: Meet at least 75% of estimated needs, prior to discharge

## 2023-12-21 NOTE — PROGRESS NOTES
End of Shift Note    Bedside shift change report given to Son Medina RN (oncoming nurse) by Alla Boyle LPN (offgoing nurse). Report included the following information SBAR, Kardex, Procedure Summary, Intake/Output, Recent Results, Med Rec Status, and Cardiac Rhythm NSR    Shift worked:  5025-0992     Shift summary and any significant changes:     Labs collected. Right leg dressing applied by general surgery kept clean, dry and intact. No complaints of pain during shift. No complaints at the time of shift change. Plan of care ongoing. Concerns for physician to address:  N/A     [facility] phone for oncoming shift:   [phone]       Activity:     Number times ambulated in hallways past shift: 0  Number of times OOB to chair past shift: 0    Cardiac:   Cardiac Monitoring: Yes           Access:  Current line(s): PIV     Genitourinary:   Urinary status: external catheter    Respiratory:      Chronic home O2 use?: NO  Incentive spirometer at bedside: N/A       GI:     Current diet:  ADULT DIET; Easy to Chew  Passing flatus: YES  Tolerating current diet: YES       Pain Management:   Patient states pain is manageable on current regimen: YES    Skin:     Interventions: float heels, increase time out of bed, limit briefs, and internal/external urinary devices    Patient Safety:  Fall Score:    Interventions: bed/chair alarm, assistive device (walker, cane.  etc), gripper socks, pt to call before getting OOB, and stay with me (per policy)       Length of Stay:  Expected LOS: 3  Actual LOS: [address]

## 2023-12-21 NOTE — WOUND CARE
Wound care nurse consulted by staff nurse to re-evaluate RLE trauma wound/large skin tear for possible infection. Patient seen by Surgical services yesterday and spoke briefly with them and they saw no s/s of infection yesterday when they removed sutures. WC nurse spoke briefly with staff nurse today.     [id number], RN, CWON

## 2023-12-21 NOTE — PROGRESS NOTES
Hospitalist Progress Note    NAME: Cathy Jones   : 1927   MRN: [id number]     Date/Time: 2023 3:16 PM  Patient PCP: RADHA Mcnamara IV, MD    Estimated discharge date:    Anticipated Disposition / Barriers: Improvement of wbc    Assessment / Plan:     Acute hypoxic respiratory failure due to HFrEF  HTN / HLD  -CTA chest:  no PE.  Moderate pleural effusions with adjacent atelectasis and mild pulmonary  edema, increased compared to 2023.  -Echo EF 40-45%, mod MR  -NT proBNP 13K  -Continue po lasix  -continue metoprolol and statin   -Wean O2 as tolerated, she is currently on 1 L    RLE hematoma / skin tear requiring sutures  S/P fall  Suspected superimposed right leg cellulitis  -holding asa and lovenox - hg is now stable at 8.6  -Sutures were placed in the ED on  and were removed yesterday as they were giving away  -Wound care recommended outpatient follow-up with [facility] wound care center  -Due to worsening leukocytosis, will start empiric antibiotics with vancomycin and Unasyn.  Keep right leg elevated.  Continue wound care instructions.  -Check CBC in a.m.    Hyponatremia  -Sodium is improving with IV diuresis.  Sodium is 132. Check bmp in am     Acute blood loss anemia from hematoma  -Presented with presented with a hemoglobin of 8.2.  Hemoglobin is now close to baseline    Proteus mirabilis urinary tract infection  -S/p completion of Rocephin      Medical Decision Making:   I personally reviewed labs: Hemoglobin due to anemia  I personally reviewed imaging:  Toxic drug monitoring:   Discussed case with: pharmacy and     Central Line:      Code status: DNR  Prophylaxis: Lovenox - held due to anemia    Subjective:     Chief Complaint / Reason for Physician Visit  She does not report any right leg pain  No fever  White count is trending up    Objective:     VITALS:   Most recent are:  Visit Vitals  BP (!) 151/64   Pulse 91   Temp 99.3 °F (37.4 °C) (Oral)   Resp 16  12/19/23  0252 12/20/23  0236 12/21/23  0217   WBC 9.0 12.5* 17.6*   HGB 7.4* 8.6* 8.2*   HCT 23.2* 27.6* 26.2*    392 414*       Recent Labs     12/19/23  0252 12/20/23  0046 12/21/23  0052   * 132* 132*   K 3.8 4.7 4.2   CL 98 98 97   CO2 30 26 30   BUN 39* 39* 41*

## 2023-12-21 NOTE — PLAN OF CARE
mobilizing. Motor planning for supine to sit was poor with patient needing guidance and assistance for each component of transfer. Sitting balance was fair/poor. Sit to stand needed max a due to weakness and poor standing balance and poor posture. Attempted gait training with RW but patient was unable to step due to inadequate weight shifting and needed to sit back down on the bed. Chair brought closer and performed a max a sit to stand with pivot to chair transfer. She was left in chair with all needs met and resting comfortably. Updated RN on best transfer technique for returning to bed. Current DC plan is for [facility] PT and assistance at [facility]. Now concerned she may need SNF/[facility] level rehab prior to returning to [facility] due to high level of assistance now needed. PLAN:  Patient continues to benefit from skilled intervention to address the above impairments. Continue treatment per established plan of care. Recommendation for discharge: (in order for the patient to meet his/her long term goals): Therapy up to 5 days/week in Skilled nursing facility    Other factors to consider for discharge: available support system works or is unable to provide adequate supervision and the patient would be alone, patient's current support system is unable to meet their requirements for physical assistance, high risk for falls, not safe to be alone, and concern for safely navigating or managing the home environment    IF patient discharges home will need the following DME: patient owns DME required for discharge and continuing to assess with progress       SUBJECTIVE:   Patient stated - patient has expressive aphasia    OBJECTIVE DATA SUMMARY:   Critical Behavior:  Orientation  Overall Orientation Status: Impaired  Orientation Level: Unable to assess (expressive aphasia)  Cognition  Overall Cognitive Status: Exceptions  Following Commands:  Follows one step commands with repetition  Attention Span: Difficulty dividing attention  Insights: Fully aware of deficits  Initiation: Requires cues for some  Sequencing: Requires cues for some    Functional Mobility Training:  Bed Mobility:  Bed Mobility Training  Bed Mobility Training: Yes  Interventions: Verbal cues;Demonstration;Manual cues;Tactile cues  Rolling: Moderate assistance  Supine to Sit: Moderate assistance;Maximum assistance  Scooting: Maximum assistance  Transfers:  Transfer Training  Transfer Training: Yes  Interventions: Manual cues;Verbal cues;Demonstration;Safety awareness training;Tactile cues;Visual cues;Weight shifting training/pressure relief  Sit to Stand: Maximum assistance  Stand to Sit: Maximum assistance  Bed to Chair: Maximum assistance (SPT)  Balance:  Balance  Sitting: Impaired  Sitting - Static: Fair (occasional)  Sitting - Dynamic: Poor (constant support)  Standing: Impaired  Standing - Static: Constant support;Poor  Standing - Dynamic: Poor;Constant support     Ambulation/Gait Training:          Therapeutic Exercises:     EXERCISE   Sets   Reps   Active Active Assist   Passive Self ROM   Comments   Ankle Pumps  10 [] [x] [] []    Quad Sets   [] [] [] []    Hamstring Sets   [] [] [] []    Gluteal Sets   [] [] [] []    Short Arc Quads   [] [] [] []    Heel Slides  10 [] [x] [] []    Straight Leg Raises   [] [] [] []    Hip abd/add  10 [] [x] [] []    Long Arc Quads   [] [] [] []    Marching   [] [] [] []       [] [] [] []                                                                                                                                                                                                                                         [facility] AM-PAC®      Basic Mobility Inpatient Short Form (6-Clicks) Version 2  How much HELP from another person do you currently need... (If the patient hasn't done an activity recently, how much help from another person do you think they would need if they tried?) Total A Lot A Little None   1.   Turning from your back to your side while in a flat bed without using bedrails? []  1 [x]  2 []  3  []  4   2.  Moving from lying on your back to sitting on the side of a flat bed without using bedrails? []  1 [x]  2 []  3  []  4   3.  Moving to and from a bed to a chair (including a wheelchair)? [x]  1 []  2 []  3  []  4   4. Standing up from a chair using your arms (e.g. wheelchair or bedside chair)? [x]  1 []  2 []  3  []  4   5.  Walking in hospital room? [x]  1 []  2 []  3  []  4   6.  Climbing 3-5 steps with a railing? [x]  1 []  2 []  3  []  4     Raw Score:                             Cutoff score ?171,2,3 had higher odds of discharging home with home health or need of SNF/IPR.    1. Marine Cardoza, Shadia Mendoza, Jarrod Solitario, Eleanor Parson, Colt Pratt, Thaddeus Cardoza.  Validity of the AM-PAC “6-Clicks” Inpatient Daily Activity and Basic Mobility Short Forms. Physical Therapy Mar 2014, 94 (3) 379-391; DOI: 10.2522/ptj.88725509  2. Pb MATIAS, Aditi J, Gabe J, Jaki J. Association of AM-PAC \"6-Clicks\" Basic Mobility and Daily Activity Scores With Discharge Destination. Phys Ther. 2021 4;101(4):hebj722. doi: 10.1093/ptj/dqzi105. PMID: 39845028.  3. Michelle RODGERS, Valentina D, Josie S, Maxi K, Ailyn S. Activity Measure for Post-Acute Care \"6-Clicks\" Basic Mobility Scores Predict Discharge Destination After Acute Care Hospitalization in Select Patient Groups: A Retrospective, Observational Study. Arch Rehabil Res Clin Transl. 2022 16;4(3):032949. doi: 10.1016/j.arrct..795868. PMID: 66720735; PMCID: ORF5160738.  4. Nani CHOW, Suzy S, Juan W, Paige P. AM-PAC Short Forms Manual 4.0. Revised 2020.                                                                                                                                                                                                                              Pain Ratin/10 L leg soreness to palpation/pressure(FACE

## 2023-12-22 ENCOUNTER — APPOINTMENT (OUTPATIENT)
Facility: HOSPITAL | Age: 88
DRG: 291 | End: 2023-12-22
Payer: MEDICARE

## 2023-12-22 LAB
ANION GAP SERPL CALC-SCNC: 6 MMOL/L (ref 5–15)
BASOPHILS # BLD: 0 K/UL (ref 0–0.1)
BASOPHILS NFR BLD: 0 % (ref 0–1)
BUN SERPL-MCNC: 50 MG/DL (ref 6–20)
BUN/CREAT SERPL: 33 (ref 12–20)
CALCIUM SERPL-MCNC: 8.5 MG/DL (ref 8.5–10.1)
CHLORIDE SERPL-SCNC: 94 MMOL/L (ref 97–108)
CO2 SERPL-SCNC: 30 MMOL/L (ref 21–32)
CREAT SERPL-MCNC: 1.5 MG/DL (ref 0.55–1.02)
DIFFERENTIAL METHOD BLD: ABNORMAL
EOSINOPHIL # BLD: 0 K/UL (ref 0–0.4)
EOSINOPHIL NFR BLD: 0 % (ref 0–7)
ERYTHROCYTE [DISTWIDTH] IN BLOOD BY AUTOMATED COUNT: 14.4 % (ref 11.5–14.5)
GLUCOSE SERPL-MCNC: 157 MG/DL (ref 65–100)
HCT VFR BLD AUTO: 24 % (ref 35–47)
HGB BLD-MCNC: 7.7 G/DL (ref 11.5–16)
IMM GRANULOCYTES # BLD AUTO: 0.2 K/UL (ref 0–0.04)
IMM GRANULOCYTES NFR BLD AUTO: 1 % (ref 0–0.5)
LYMPHOCYTES # BLD: 2 K/UL (ref 0.8–3.5)
LYMPHOCYTES NFR BLD: 10 % (ref 12–49)
MCH RBC QN AUTO: 30.4 PG (ref 26–34)
MCHC RBC AUTO-ENTMCNC: 32.1 G/DL (ref 30–36.5)
MCV RBC AUTO: 94.9 FL (ref 80–99)
MONOCYTES # BLD: 2.2 K/UL (ref 0–1)
MONOCYTES NFR BLD: 11 % (ref 5–13)
NEUTS SEG # BLD: 15.3 K/UL (ref 1.8–8)
NEUTS SEG NFR BLD: 78 % (ref 32–75)
NRBC # BLD: 0 K/UL (ref 0–0.01)
NRBC BLD-RTO: 0 PER 100 WBC
PLATELET # BLD AUTO: 392 K/UL (ref 150–400)
PLATELET COMMENT: ABNORMAL
PMV BLD AUTO: 10.1 FL (ref 8.9–12.9)
POTASSIUM SERPL-SCNC: 4.1 MMOL/L (ref 3.5–5.1)
PROCALCITONIN SERPL-MCNC: 2.16 NG/ML
RBC # BLD AUTO: 2.53 M/UL (ref 3.8–5.2)
RBC MORPH BLD: ABNORMAL
SODIUM SERPL-SCNC: 130 MMOL/L (ref 136–145)
VANCOMYCIN SERPL-MCNC: 20.3 UG/ML
WBC # BLD AUTO: 19.7 K/UL (ref 3.6–11)

## 2023-12-22 PROCEDURE — 84145 PROCALCITONIN (PCT): CPT

## 2023-12-22 PROCEDURE — 6370000000 HC RX 637 (ALT 250 FOR IP): Performed by: NURSE PRACTITIONER

## 2023-12-22 PROCEDURE — 80048 BASIC METABOLIC PNL TOTAL CA: CPT

## 2023-12-22 PROCEDURE — 6360000002 HC RX W HCPCS: Performed by: INTERNAL MEDICINE

## 2023-12-22 PROCEDURE — 85025 COMPLETE CBC W/AUTO DIFF WBC: CPT

## 2023-12-22 PROCEDURE — 97530 THERAPEUTIC ACTIVITIES: CPT

## 2023-12-22 PROCEDURE — 1100000000 HC RM PRIVATE

## 2023-12-22 PROCEDURE — 36415 COLL VENOUS BLD VENIPUNCTURE: CPT

## 2023-12-22 PROCEDURE — 71045 X-RAY EXAM CHEST 1 VIEW: CPT

## 2023-12-22 PROCEDURE — 6370000000 HC RX 637 (ALT 250 FOR IP): Performed by: INTERNAL MEDICINE

## 2023-12-22 PROCEDURE — 2580000003 HC RX 258: Performed by: INTERNAL MEDICINE

## 2023-12-22 PROCEDURE — 80202 ASSAY OF VANCOMYCIN: CPT

## 2023-12-22 RX ORDER — FUROSEMIDE 10 MG/ML
40 INJECTION INTRAMUSCULAR; INTRAVENOUS 2 TIMES DAILY
Status: DISCONTINUED | OUTPATIENT
Start: 2023-12-22 | End: 2023-12-23

## 2023-12-22 RX ADMIN — SODIUM CHLORIDE, PRESERVATIVE FREE 10 ML: 5 INJECTION INTRAVENOUS at 21:15

## 2023-12-22 RX ADMIN — METOPROLOL SUCCINATE 25 MG: 25 TABLET, EXTENDED RELEASE ORAL at 10:26

## 2023-12-22 RX ADMIN — FUROSEMIDE 40 MG: 10 INJECTION, SOLUTION INTRAMUSCULAR; INTRAVENOUS at 10:25

## 2023-12-22 RX ADMIN — VANCOMYCIN HYDROCHLORIDE 750 MG: 750 INJECTION, POWDER, LYOPHILIZED, FOR SOLUTION INTRAVENOUS at 16:59

## 2023-12-22 RX ADMIN — AMPICILLIN SODIUM AND SULBACTAM SODIUM 3000 MG: 2; 1 INJECTION, POWDER, FOR SOLUTION INTRAMUSCULAR; INTRAVENOUS at 13:48

## 2023-12-22 RX ADMIN — FUROSEMIDE 40 MG: 10 INJECTION, SOLUTION INTRAMUSCULAR; INTRAVENOUS at 18:09

## 2023-12-22 RX ADMIN — AMPICILLIN SODIUM AND SULBACTAM SODIUM 3000 MG: 2; 1 INJECTION, POWDER, FOR SOLUTION INTRAMUSCULAR; INTRAVENOUS at 04:12

## 2023-12-22 RX ADMIN — MELATONIN 3 MG: at 21:15

## 2023-12-22 RX ADMIN — AMLODIPINE BESYLATE 2.5 MG: 2.5 TABLET ORAL at 10:26

## 2023-12-22 RX ADMIN — SODIUM CHLORIDE, PRESERVATIVE FREE 10 ML: 5 INJECTION INTRAVENOUS at 10:26

## 2023-12-22 RX ADMIN — OXCARBAZEPINE 150 MG: 300 TABLET, FILM COATED ORAL at 21:15

## 2023-12-22 RX ADMIN — OXCARBAZEPINE 150 MG: 300 TABLET, FILM COATED ORAL at 10:25

## 2023-12-22 RX ADMIN — PRAVASTATIN SODIUM 10 MG: 10 TABLET ORAL at 21:15

## 2023-12-22 ASSESSMENT — PAIN SCALES - GENERAL: PAINLEVEL_OUTOF10: 0

## 2023-12-22 NOTE — PROGRESS NOTES
Hospitalist Progress Note    NAME: Burgess Lambert   : 1927   MRN: [id number]     Date/Time: 2023 1:58 PM  Patient PCP: Ivory Ann MD    Estimated discharge date:    Anticipated Disposition / Barriers: Improvement of wbc    Assessment / Plan:     Acute hypoxic respiratory failure due to HFrEF  HTN / HLD  -CTA chest:  no PE. Moderate pleural effusions with adjacent atelectasis and mild pulmonary  edema, increased compared to 2023.  -Echo EF 40-45%, mod MR  -NT proBNP 13K  -Change Lasix to 40 mg IV twice daily as she is retaining more fluid as evidenced by hyponatremia  -continue metoprolol and statin   -She is currently on room air  -Will repeat a chest x-ray    RLE hematoma / skin tear requiring sutures  S/P fall  Suspected superimposed right leg cellulitis  -holding asa and lovenox - hg is now stable at 8.6  -Sutures were placed in the ED on  and were removed yesterday as they were giving away  -Wound care recommended outpatient follow-up with [facility] wound care center  -WBC count is slightly up today compared to yesterday but she is looking better. Wound examined and it looks better as well. Will continue vancomycin and Unasyn for now and check CBC in a.m. tomorrow  -Blood cultures negative so far  -Due to worsening leukocytosis, will start empiric antibiotics with vancomycin and Unasyn. Keep right leg elevated. Continue wound care instructions.  -Check CBC in a.m. Hyponatremia  -Increase diuresis with Lasix 40 mg IV twice daily. Check BMP in a.m. Acute blood loss anemia from hematoma  -Hemoglobin is 7.7 this morning.   Hemoglobin is now close to baseline    Proteus mirabilis urinary tract infection  -S/p completion of Rocephin      Medical Decision Making:   I personally reviewed labs: Hemoglobin due to anemia  I personally reviewed imaging:  Toxic drug monitoring:   Discussed case with: pharmacy and     Central Line:      Code status: DNR  Prophylaxis: Lovenox - held due to anemia    Subjective:     Chief Complaint / Reason for Physician Visit  She is sitting up in chair this morning  She reports that left leg pain is better  She is on room air  Shortness of breath is also improved    Objective:     VITALS:   Most recent are:  Visit Vitals  /62   Pulse 91   Temp 98.5 °F (36.9 °C) (Oral)   Resp 15   Ht 1.524 m (5')   Wt 67.5 kg (148 lb 13 oz)   SpO2 96%   BMI 29.06 kg/m²       I had a face to face encounter and independently examined this patient on 12/22/2023, as outlined below:  PHYSICAL EXAM:  General: WD, WN. Alert, cooperative, no acute distress    EENT:  EOMI. Anicteric sclerae. MMM  Resp:  Coarse bs bases.  No accessory muscle use  CV:  Regular  rhythm,  no significant edema  GI:  Soft, Non distended, Non tender.  +Bowel sounds  Neurologic:  Alert and oriented X 3, normal speech,   Psych:   Not anxious nor agitated  Skin:  Right leg Ace wrap present    Reviewed most current lab test results and cultures  YES  Reviewed most current radiology test results   YES  Review and summation of old records today    NO  Reviewed patient's current orders and MAR    YES  PMH/SH reviewed - no change compared to H&P    ________________________________________________________________________  Care Plan discussed with:    Comments   Patient x    Family      RN x    Care Manager     Consultant  x                      Multidiciplinary team rounds were held today with , nursing, pharmacist and clinical coordinator.  Patient's plan of care was discussed; medications were reviewed and discharge planning was addressed.     ________________________________________________________________________  Total NON critical care TIME:   Minutes    Total CRITICAL CARE TIME Spent:   Minutes non procedure based      Comments   >50% of visit spent in counseling and coordination of care x     This includes time during multidisciplinary rounds if indicated above

## 2023-12-22 NOTE — PLAN OF CARE
Problem: Physical Therapy - Adult  Goal: By Discharge: Performs mobility at highest level of function for planned discharge setting. See evaluation for individualized goals. Description: FUNCTIONAL STATUS PRIOR TO ADMISSION: Patient was cg assistance using a rollator for functional mobility. and The patient  required moderate assistance for basic and instrumental ADLs. HOME SUPPORT PRIOR TO ADMISSION: The patient lived alone with paid help at [facility] and required moderate assistance for dressing and bathing. Physical Therapy Goals  Initiated 12/15/2023  1. Patient will move from supine to sit and sit to supine, scoot up and down, and roll side to side in bed with minimal assistance within 7 day(s). 2.  Patient will perform sit to stand with minimal assistance within 7 day(s). 3.  Patient will transfer from bed to chair and chair to bed with contact guard assist using the least restrictive device within 7 day(s). 4.  Patient will ambulate with contact guard assist for 35 feet with the least restrictive device within 7 day(s). Physical Therapy Goals  Revised 12/22/2023  1. Patient will move from supine to sit and sit to supine , scoot up and down, and roll side to side in bed with minimal assistance within 7 day(s). 2.  Patient will transfer from bed to chair and chair to bed with minimal-moderate assistance using the least restrictive device within 7 day(s). 3.  Patient will perform sit to stand with minimal - moderate assistance within 7 day(s). 4.  Patient will ambulate with moderate assistance  for 10 feet with the least restrictive device within 7 day(s).         Outcome: Not Progressing   PHYSICAL THERAPY TREATMENT: WEEKLY REASSESSMENT    Patient: Cici Regalado (89 y.o. female)  Date: 12/22/2023  Primary Diagnosis: Pleural effusion [J90]  Acute respiratory failure with hypoxia (HCC) [J96.01]  CHF (congestive heart failure), NYHA class I, acute on chronic, combined ([address]) [I50.43]  Noninfected Assistance: Needs assistance (cg to min a ambulation with RW)  Transfer Assistance: Independent  Active : No  Patient's  Info: Daughter or facility provides transport  Mode of Transportation: Car  Occupation: Retired  Critical Behavior:  Orientation  Orientation Level: Unable to assess (aphasia)  Cognition  Following Commands: Follows one step commands with repetition  Initiation: Requires cues for some  Sequencing: Requires cues for some    Hearing:   Hearing  Hearing: Within functional limits        Functional Mobility:  Bed Mobility:     Bed Mobility Training  Bed Mobility Training: Yes  Interventions: Verbal cues;Tactile cues;Safety awareness training;Manual cues  Rolling: Moderate assistance  Supine to Sit: Moderate assistance;Maximum assistance  Scooting: Maximum assistance  Transfers:     Transfer Training  Transfer Training: Yes  Interventions: Manual cues;Verbal cues;Demonstration;Safety awareness training;Tactile cues;Visual cues;Weight shifting training/pressure relief  Sit to Stand: Maximum assistance;Moderate assistance;Assist X2  Stand to Sit: Maximum assistance  Bed to Chair: Maximum assistance;Moderate assistance;Assist X2  Balance:               Balance  Sitting: Impaired  Sitting - Static: Fair (occasional)  Sitting - Dynamic: Poor (constant support)  Standing: Impaired  Standing - Static: Constant support;Poor (with RW)  Standing - Dynamic: Poor;Constant support (with RW)                                                                                                                                                                                                                                       [facility] AM-PAC®      Basic Mobility Inpatient Short Form (6-Clicks) Version 2  How much HELP from another person do you currently need... (If the patient hasn't done an activity recently, how much help from another person do you think they would need if they tried?) Total A Lot A

## 2023-12-23 LAB
ANION GAP SERPL CALC-SCNC: 6 MMOL/L (ref 5–15)
BASOPHILS # BLD: 0.1 K/UL (ref 0–0.1)
BASOPHILS NFR BLD: 1 % (ref 0–1)
BUN SERPL-MCNC: 49 MG/DL (ref 6–20)
BUN/CREAT SERPL: 43 (ref 12–20)
CALCIUM SERPL-MCNC: 6.9 MG/DL (ref 8.5–10.1)
CHLORIDE SERPL-SCNC: 106 MMOL/L (ref 97–108)
CO2 SERPL-SCNC: 25 MMOL/L (ref 21–32)
CREAT SERPL-MCNC: 1.15 MG/DL (ref 0.55–1.02)
DIFFERENTIAL METHOD BLD: ABNORMAL
EOSINOPHIL # BLD: 0.1 K/UL (ref 0–0.4)
EOSINOPHIL NFR BLD: 1 % (ref 0–7)
ERYTHROCYTE [DISTWIDTH] IN BLOOD BY AUTOMATED COUNT: 14.6 % (ref 11.5–14.5)
GLUCOSE SERPL-MCNC: 156 MG/DL (ref 65–100)
HCT VFR BLD AUTO: 24.1 % (ref 35–47)
HGB BLD-MCNC: 7.7 G/DL (ref 11.5–16)
IMM GRANULOCYTES # BLD AUTO: 0.1 K/UL (ref 0–0.04)
IMM GRANULOCYTES NFR BLD AUTO: 1 % (ref 0–0.5)
LYMPHOCYTES # BLD: 1.7 K/UL (ref 0.8–3.5)
LYMPHOCYTES NFR BLD: 11 % (ref 12–49)
MCH RBC QN AUTO: 30.7 PG (ref 26–34)
MCHC RBC AUTO-ENTMCNC: 32 G/DL (ref 30–36.5)
MCV RBC AUTO: 96 FL (ref 80–99)
MONOCYTES # BLD: 1.4 K/UL (ref 0–1)
MONOCYTES NFR BLD: 9 % (ref 5–13)
NEUTS SEG # BLD: 12 K/UL (ref 1.8–8)
NEUTS SEG NFR BLD: 77 % (ref 32–75)
NRBC # BLD: 0 K/UL (ref 0–0.01)
NRBC BLD-RTO: 0 PER 100 WBC
PLATELET # BLD AUTO: 427 K/UL (ref 150–400)
PMV BLD AUTO: 9.9 FL (ref 8.9–12.9)
POTASSIUM SERPL-SCNC: 3.3 MMOL/L (ref 3.5–5.1)
RBC # BLD AUTO: 2.51 M/UL (ref 3.8–5.2)
SODIUM SERPL-SCNC: 137 MMOL/L (ref 136–145)
WBC # BLD AUTO: 15.3 K/UL (ref 3.6–11)

## 2023-12-23 PROCEDURE — 6370000000 HC RX 637 (ALT 250 FOR IP): Performed by: NURSE PRACTITIONER

## 2023-12-23 PROCEDURE — 1100000000 HC RM PRIVATE

## 2023-12-23 PROCEDURE — 80048 BASIC METABOLIC PNL TOTAL CA: CPT

## 2023-12-23 PROCEDURE — 6360000002 HC RX W HCPCS: Performed by: INTERNAL MEDICINE

## 2023-12-23 PROCEDURE — 6370000000 HC RX 637 (ALT 250 FOR IP): Performed by: INTERNAL MEDICINE

## 2023-12-23 PROCEDURE — 85025 COMPLETE CBC W/AUTO DIFF WBC: CPT

## 2023-12-23 PROCEDURE — 2580000003 HC RX 258: Performed by: INTERNAL MEDICINE

## 2023-12-23 PROCEDURE — 36415 COLL VENOUS BLD VENIPUNCTURE: CPT

## 2023-12-23 RX ORDER — FUROSEMIDE 40 MG/1
40 TABLET ORAL DAILY
Status: DISCONTINUED | OUTPATIENT
Start: 2023-12-24 | End: 2023-12-24

## 2023-12-23 RX ORDER — POTASSIUM CHLORIDE 750 MG/1
40 TABLET, FILM COATED, EXTENDED RELEASE ORAL ONCE
Status: DISCONTINUED | OUTPATIENT
Start: 2023-12-23 | End: 2023-12-23

## 2023-12-23 RX ADMIN — METOPROLOL SUCCINATE 25 MG: 25 TABLET, EXTENDED RELEASE ORAL at 09:00

## 2023-12-23 RX ADMIN — POLYETHYLENE GLYCOL 3350 17 G: 17 POWDER, FOR SOLUTION ORAL at 09:00

## 2023-12-23 RX ADMIN — POTASSIUM BICARBONATE 40 MEQ: 782 TABLET, EFFERVESCENT ORAL at 18:19

## 2023-12-23 RX ADMIN — FUROSEMIDE 40 MG: 10 INJECTION, SOLUTION INTRAMUSCULAR; INTRAVENOUS at 09:00

## 2023-12-23 RX ADMIN — SODIUM CHLORIDE, PRESERVATIVE FREE 10 ML: 5 INJECTION INTRAVENOUS at 22:19

## 2023-12-23 RX ADMIN — OXCARBAZEPINE 150 MG: 300 TABLET, FILM COATED ORAL at 09:00

## 2023-12-23 RX ADMIN — ACETAMINOPHEN 650 MG: 325 TABLET ORAL at 09:00

## 2023-12-23 RX ADMIN — AMLODIPINE BESYLATE 2.5 MG: 2.5 TABLET ORAL at 09:00

## 2023-12-23 RX ADMIN — SODIUM CHLORIDE, PRESERVATIVE FREE 10 ML: 5 INJECTION INTRAVENOUS at 09:00

## 2023-12-23 RX ADMIN — OXCARBAZEPINE 150 MG: 300 TABLET, FILM COATED ORAL at 22:18

## 2023-12-23 RX ADMIN — VANCOMYCIN HYDROCHLORIDE 500 MG: 500 INJECTION, POWDER, LYOPHILIZED, FOR SOLUTION INTRAVENOUS at 19:21

## 2023-12-23 RX ADMIN — AMPICILLIN SODIUM AND SULBACTAM SODIUM 3000 MG: 2; 1 INJECTION, POWDER, FOR SOLUTION INTRAMUSCULAR; INTRAVENOUS at 15:16

## 2023-12-23 RX ADMIN — PRAVASTATIN SODIUM 10 MG: 10 TABLET ORAL at 22:19

## 2023-12-23 RX ADMIN — FUROSEMIDE 40 MG: 10 INJECTION, SOLUTION INTRAMUSCULAR; INTRAVENOUS at 18:18

## 2023-12-23 RX ADMIN — AMPICILLIN SODIUM AND SULBACTAM SODIUM 3000 MG: 2; 1 INJECTION, POWDER, FOR SOLUTION INTRAMUSCULAR; INTRAVENOUS at 01:15

## 2023-12-23 RX ADMIN — ACETAMINOPHEN 650 MG: 325 TABLET ORAL at 18:40

## 2023-12-23 NOTE — PROGRESS NOTES
Bedside and Verbal shift change report given to Bharathi Dickerson LPN (oncoming nurse) by Daniel Andrade RN (offgoing nurse). Report included the following information Nurse Handoff Report, Index, MAR, Recent Results, and Quality Measures.

## 2023-12-24 LAB
ANION GAP SERPL CALC-SCNC: 6 MMOL/L (ref 5–15)
BASOPHILS # BLD: 0.1 K/UL (ref 0–0.1)
BASOPHILS NFR BLD: 1 % (ref 0–1)
BUN SERPL-MCNC: 58 MG/DL (ref 6–20)
BUN/CREAT SERPL: 43 (ref 12–20)
CALCIUM SERPL-MCNC: 8.5 MG/DL (ref 8.5–10.1)
CHLORIDE SERPL-SCNC: 97 MMOL/L (ref 97–108)
CO2 SERPL-SCNC: 32 MMOL/L (ref 21–32)
CREAT SERPL-MCNC: 1.35 MG/DL (ref 0.55–1.02)
DIFFERENTIAL METHOD BLD: ABNORMAL
EOSINOPHIL # BLD: 0.1 K/UL (ref 0–0.4)
EOSINOPHIL NFR BLD: 1 % (ref 0–7)
ERYTHROCYTE [DISTWIDTH] IN BLOOD BY AUTOMATED COUNT: 14.6 % (ref 11.5–14.5)
GLUCOSE SERPL-MCNC: 178 MG/DL (ref 65–100)
HCT VFR BLD AUTO: 23.3 % (ref 35–47)
HGB BLD-MCNC: 7.2 G/DL (ref 11.5–16)
IMM GRANULOCYTES # BLD AUTO: 0.1 K/UL (ref 0–0.04)
IMM GRANULOCYTES NFR BLD AUTO: 1 % (ref 0–0.5)
LYMPHOCYTES # BLD: 1.4 K/UL (ref 0.8–3.5)
LYMPHOCYTES NFR BLD: 11 % (ref 12–49)
MCH RBC QN AUTO: 29.3 PG (ref 26–34)
MCHC RBC AUTO-ENTMCNC: 30.9 G/DL (ref 30–36.5)
MCV RBC AUTO: 94.7 FL (ref 80–99)
MONOCYTES # BLD: 1.2 K/UL (ref 0–1)
MONOCYTES NFR BLD: 10 % (ref 5–13)
NEUTS SEG # BLD: 9.6 K/UL (ref 1.8–8)
NEUTS SEG NFR BLD: 76 % (ref 32–75)
NRBC # BLD: 0 K/UL (ref 0–0.01)
NRBC BLD-RTO: 0 PER 100 WBC
PLATELET # BLD AUTO: 401 K/UL (ref 150–400)
PMV BLD AUTO: 10.1 FL (ref 8.9–12.9)
POTASSIUM SERPL-SCNC: 3.9 MMOL/L (ref 3.5–5.1)
RBC # BLD AUTO: 2.46 M/UL (ref 3.8–5.2)
SODIUM SERPL-SCNC: 135 MMOL/L (ref 136–145)
VANCOMYCIN SERPL-MCNC: 21.9 UG/ML
WBC # BLD AUTO: 12.4 K/UL (ref 3.6–11)

## 2023-12-24 PROCEDURE — 1100000000 HC RM PRIVATE

## 2023-12-24 PROCEDURE — 36415 COLL VENOUS BLD VENIPUNCTURE: CPT

## 2023-12-24 PROCEDURE — 80048 BASIC METABOLIC PNL TOTAL CA: CPT

## 2023-12-24 PROCEDURE — 2580000003 HC RX 258: Performed by: INTERNAL MEDICINE

## 2023-12-24 PROCEDURE — 6370000000 HC RX 637 (ALT 250 FOR IP): Performed by: INTERNAL MEDICINE

## 2023-12-24 PROCEDURE — 6360000002 HC RX W HCPCS: Performed by: INTERNAL MEDICINE

## 2023-12-24 PROCEDURE — 85025 COMPLETE CBC W/AUTO DIFF WBC: CPT

## 2023-12-24 PROCEDURE — 6370000000 HC RX 637 (ALT 250 FOR IP): Performed by: NURSE PRACTITIONER

## 2023-12-24 PROCEDURE — 80202 ASSAY OF VANCOMYCIN: CPT

## 2023-12-24 RX ORDER — FUROSEMIDE 40 MG/1
40 TABLET ORAL 2 TIMES DAILY
Status: DISCONTINUED | OUTPATIENT
Start: 2023-12-24 | End: 2023-12-26 | Stop reason: HOSPADM

## 2023-12-24 RX ADMIN — OXCARBAZEPINE 150 MG: 300 TABLET, FILM COATED ORAL at 20:30

## 2023-12-24 RX ADMIN — AMPICILLIN SODIUM AND SULBACTAM SODIUM 3000 MG: 2; 1 INJECTION, POWDER, FOR SOLUTION INTRAMUSCULAR; INTRAVENOUS at 13:49

## 2023-12-24 RX ADMIN — VANCOMYCIN HYDROCHLORIDE 500 MG: 500 INJECTION, POWDER, LYOPHILIZED, FOR SOLUTION INTRAVENOUS at 18:52

## 2023-12-24 RX ADMIN — SODIUM CHLORIDE, PRESERVATIVE FREE 10 ML: 5 INJECTION INTRAVENOUS at 20:27

## 2023-12-24 RX ADMIN — AMPICILLIN SODIUM AND SULBACTAM SODIUM 3000 MG: 2; 1 INJECTION, POWDER, FOR SOLUTION INTRAMUSCULAR; INTRAVENOUS at 03:04

## 2023-12-24 RX ADMIN — PRAVASTATIN SODIUM 10 MG: 10 TABLET ORAL at 20:27

## 2023-12-24 RX ADMIN — METOPROLOL SUCCINATE 25 MG: 25 TABLET, EXTENDED RELEASE ORAL at 11:22

## 2023-12-24 RX ADMIN — AMLODIPINE BESYLATE 2.5 MG: 2.5 TABLET ORAL at 11:22

## 2023-12-24 RX ADMIN — OXCARBAZEPINE 150 MG: 300 TABLET, FILM COATED ORAL at 11:22

## 2023-12-24 RX ADMIN — ACETAMINOPHEN 650 MG: 325 TABLET ORAL at 11:22

## 2023-12-24 RX ADMIN — SODIUM CHLORIDE, PRESERVATIVE FREE 10 ML: 5 INJECTION INTRAVENOUS at 11:22

## 2023-12-24 ASSESSMENT — PAIN SCALES - PAIN ASSESSMENT IN ADVANCED DEMENTIA (PAINAD)
BREATHING: 0
TOTALSCORE: 0
NEGVOCALIZATION: 0
BODYLANGUAGE: 0
FACIALEXPRESSION: 0
CONSOLABILITY: 0

## 2023-12-24 NOTE — PROGRESS NOTES
Hospitalist Progress Note    NAME: Corey Gutierrez   : 1927   MRN: [id number]     Date/Time: 2023 1:16 PM  Patient PCP: Tejal Finney MD    Estimated discharge date:    Anticipated Disposition / Barriers: Improvement of wbc    Assessment / Plan:     Acute hypoxic respiratory failure due to HFrEF  HTN / HLD  -CTA chest:  no PE. Moderate pleural effusions with adjacent atelectasis and mild pulmonary  edema, increased compared to 2023.  -Echo EF 40-45%, mod MR  -NT proBNP 13K  -Change Lasix to 40 mg IV twice daily as she is retaining more fluid as evidenced by hyponatremia  -continue metoprolol and statin   -She is currently on room air  -Will repeat a chest x-ray    RLE hematoma / skin tear requiring sutures  S/P fall  Suspected superimposed right leg cellulitis  -holding asa and lovenox - hg is now stable at 8.6  -Sutures were placed in the ED on  and were removed yesterday as they were giving away  -Wound care recommended outpatient follow-up with [facility] wound care center  -WBC count is improving. Continue vancomycin and Unasyn for now. -Blood cultures negative so far  -Leukocytosis is now trending down. Continue antibiotics as above. -Check CBC in a.m. Hyponatremia  -Sodium is improved. Continue p.o. Lasix    Acute blood loss anemia from hematoma  -Hemoglobin is 7.2 this morning. Hemoglobin is now close to baseline    Proteus mirabilis urinary tract infection  -S/p completion of Rocephin      Medical Decision Making:   I personally reviewed labs: Hemoglobin due to anemia  I personally reviewed imaging:  Toxic drug monitoring:   Discussed case with: pharmacy and     Central Line:      Code status: DNR  Prophylaxis: Lovenox - held due to anemia    Subjective:     Chief Complaint / Reason for Physician Visit  She had 1 episode of borderline fever of 99.7. She looks good. No fever.   Daughter present at bedside    Objective:     VITALS:   Most recent

## 2023-12-24 NOTE — PROGRESS NOTES
Hospitalist Progress Note    NAME: Kelsey Carmichael   : 1927   MRN: [id number]     Date/Time: 2023 9:43 PM  Patient PCP: Justyna Romo MD    Estimated discharge date:    Anticipated Disposition / Barriers: Improvement of wbc    Assessment / Plan:     Acute hypoxic respiratory failure due to HFrEF  HTN / HLD  -CTA chest:  no PE. Moderate pleural effusions with adjacent atelectasis and mild pulmonary  edema, increased compared to 2023.  -Echo EF 40-45%, mod MR  -NT proBNP 13K  -Change Lasix to 40 mg IV twice daily as she is retaining more fluid as evidenced by hyponatremia  -continue metoprolol and statin   -She is currently on room air  -Will repeat a chest x-ray    RLE hematoma / skin tear requiring sutures  S/P fall  Suspected superimposed right leg cellulitis  -holding asa and lovenox - hg is now stable at 8.6  -Sutures were placed in the ED on  and were removed yesterday as they were giving away  -Wound care recommended outpatient follow-up with [facility] wound care center  -WBC count is improving. Continue vancomycin and Unasyn for now. -Blood cultures negative so far  -Due to worsening leukocytosis, will start empiric antibiotics with vancomycin and Unasyn. Keep right leg elevated. Continue wound care instructions.  -Check CBC in a.m. Hyponatremia  -Change Lasix to p.o. Check BMP in a.m. Acute blood loss anemia from hematoma  -Hemoglobin is 7.7 this morning.   Hemoglobin is now close to baseline    Proteus mirabilis urinary tract infection  -S/p completion of Rocephin      Medical Decision Making:   I personally reviewed labs: Hemoglobin due to anemia  I personally reviewed imaging:  Toxic drug monitoring:   Discussed case with: pharmacy and     Central Line:      Code status: DNR  Prophylaxis: Lovenox - held due to anemia    Subjective:     Chief Complaint / Reason for Physician Visit  Does not report any right leg pain  WBC is

## 2023-12-24 NOTE — PLAN OF CARE
Problem: Skin/Tissue Integrity  Goal: Absence of new skin breakdown  Description: 1. Monitor for areas of redness and/or skin breakdown  2. Assess vascular access sites hourly  3. Every 4-6 hours minimum:  Change oxygen saturation probe site  4. Every 4-6 hours:  If on nasal continuous positive airway pressure, respiratory therapy assess nares and determine need for appliance change or resting period.   Outcome: Progressing     Problem: Discharge Planning  Goal: Discharge to home or other facility with appropriate resources  Outcome: Progressing  Flowsheets (Taken 12/23/2023 2031 by Rawleigh Goldmann, RN)  Discharge to home or other facility with appropriate resources: Identify barriers to discharge with patient and caregiver     Problem: Pain  Goal: Verbalizes/displays adequate comfort level or baseline comfort level  Outcome: Progressing     Problem: Safety - Adult  Goal: Free from fall injury  Outcome: Progressing     Problem: Chronic Conditions and Co-morbidities  Goal: Patient's chronic conditions and co-morbidity symptoms are monitored and maintained or improved  Outcome: Progressing  Flowsheets (Taken 12/23/2023 2031 by Rawleigh Goldmann, RN)  Care Plan - Patient's Chronic Conditions and Co-Morbidity Symptoms are Monitored and Maintained or Improved: Monitor and assess patient's chronic conditions and comorbid symptoms for stability, deterioration, or improvement

## 2023-12-25 LAB
ANION GAP SERPL CALC-SCNC: 6 MMOL/L (ref 5–15)
BASOPHILS # BLD: 0.1 K/UL (ref 0–0.1)
BASOPHILS NFR BLD: 1 % (ref 0–1)
BUN SERPL-MCNC: 49 MG/DL (ref 6–20)
BUN/CREAT SERPL: 41 (ref 12–20)
CALCIUM SERPL-MCNC: 8.4 MG/DL (ref 8.5–10.1)
CHLORIDE SERPL-SCNC: 99 MMOL/L (ref 97–108)
CO2 SERPL-SCNC: 31 MMOL/L (ref 21–32)
CREAT SERPL-MCNC: 1.2 MG/DL (ref 0.55–1.02)
DIFFERENTIAL METHOD BLD: ABNORMAL
EOSINOPHIL # BLD: 0.2 K/UL (ref 0–0.4)
EOSINOPHIL NFR BLD: 2 % (ref 0–7)
ERYTHROCYTE [DISTWIDTH] IN BLOOD BY AUTOMATED COUNT: 14.7 % (ref 11.5–14.5)
GLUCOSE SERPL-MCNC: 172 MG/DL (ref 65–100)
HCT VFR BLD AUTO: 22.9 % (ref 35–47)
HGB BLD-MCNC: 7.1 G/DL (ref 11.5–16)
IMM GRANULOCYTES # BLD AUTO: 0.1 K/UL (ref 0–0.04)
IMM GRANULOCYTES NFR BLD AUTO: 1 % (ref 0–0.5)
LYMPHOCYTES # BLD: 1.5 K/UL (ref 0.8–3.5)
LYMPHOCYTES NFR BLD: 14 % (ref 12–49)
MCH RBC QN AUTO: 29.6 PG (ref 26–34)
MCHC RBC AUTO-ENTMCNC: 31 G/DL (ref 30–36.5)
MCV RBC AUTO: 95.4 FL (ref 80–99)
MONOCYTES # BLD: 1 K/UL (ref 0–1)
MONOCYTES NFR BLD: 9 % (ref 5–13)
NEUTS SEG # BLD: 8.1 K/UL (ref 1.8–8)
NEUTS SEG NFR BLD: 73 % (ref 32–75)
NRBC # BLD: 0 K/UL (ref 0–0.01)
NRBC BLD-RTO: 0 PER 100 WBC
PLATELET # BLD AUTO: 421 K/UL (ref 150–400)
PMV BLD AUTO: 9.6 FL (ref 8.9–12.9)
POTASSIUM SERPL-SCNC: 3.9 MMOL/L (ref 3.5–5.1)
RBC # BLD AUTO: 2.4 M/UL (ref 3.8–5.2)
SODIUM SERPL-SCNC: 136 MMOL/L (ref 136–145)
WBC # BLD AUTO: 11 K/UL (ref 3.6–11)

## 2023-12-25 PROCEDURE — 2580000003 HC RX 258: Performed by: INTERNAL MEDICINE

## 2023-12-25 PROCEDURE — 6360000002 HC RX W HCPCS: Performed by: INTERNAL MEDICINE

## 2023-12-25 PROCEDURE — 1100000000 HC RM PRIVATE

## 2023-12-25 PROCEDURE — 6370000000 HC RX 637 (ALT 250 FOR IP): Performed by: INTERNAL MEDICINE

## 2023-12-25 PROCEDURE — 85025 COMPLETE CBC W/AUTO DIFF WBC: CPT

## 2023-12-25 PROCEDURE — 80048 BASIC METABOLIC PNL TOTAL CA: CPT

## 2023-12-25 PROCEDURE — 36415 COLL VENOUS BLD VENIPUNCTURE: CPT

## 2023-12-25 PROCEDURE — 6370000000 HC RX 637 (ALT 250 FOR IP): Performed by: NURSE PRACTITIONER

## 2023-12-25 RX ADMIN — FUROSEMIDE 40 MG: 40 TABLET ORAL at 10:08

## 2023-12-25 RX ADMIN — OXCARBAZEPINE 150 MG: 300 TABLET, FILM COATED ORAL at 20:33

## 2023-12-25 RX ADMIN — OXCARBAZEPINE 150 MG: 300 TABLET, FILM COATED ORAL at 10:08

## 2023-12-25 RX ADMIN — METOPROLOL SUCCINATE 25 MG: 25 TABLET, EXTENDED RELEASE ORAL at 10:09

## 2023-12-25 RX ADMIN — ENOXAPARIN SODIUM 30 MG: 100 INJECTION SUBCUTANEOUS at 10:08

## 2023-12-25 RX ADMIN — ACETAMINOPHEN 650 MG: 325 TABLET ORAL at 17:37

## 2023-12-25 RX ADMIN — VANCOMYCIN HYDROCHLORIDE 500 MG: 500 INJECTION, POWDER, LYOPHILIZED, FOR SOLUTION INTRAVENOUS at 19:09

## 2023-12-25 RX ADMIN — AMPICILLIN SODIUM AND SULBACTAM SODIUM 3000 MG: 2; 1 INJECTION, POWDER, FOR SOLUTION INTRAMUSCULAR; INTRAVENOUS at 02:28

## 2023-12-25 RX ADMIN — FUROSEMIDE 40 MG: 40 TABLET ORAL at 17:37

## 2023-12-25 RX ADMIN — SODIUM CHLORIDE, PRESERVATIVE FREE 10 ML: 5 INJECTION INTRAVENOUS at 10:09

## 2023-12-25 RX ADMIN — SODIUM CHLORIDE, PRESERVATIVE FREE 10 ML: 5 INJECTION INTRAVENOUS at 20:34

## 2023-12-25 RX ADMIN — AMPICILLIN SODIUM AND SULBACTAM SODIUM 3000 MG: 2; 1 INJECTION, POWDER, FOR SOLUTION INTRAMUSCULAR; INTRAVENOUS at 15:44

## 2023-12-25 RX ADMIN — PRAVASTATIN SODIUM 10 MG: 10 TABLET ORAL at 20:33

## 2023-12-25 RX ADMIN — AMLODIPINE BESYLATE 2.5 MG: 2.5 TABLET ORAL at 10:09

## 2023-12-25 ASSESSMENT — PAIN SCALES - PAIN ASSESSMENT IN ADVANCED DEMENTIA (PAINAD)
TOTALSCORE: 0
BODYLANGUAGE: 0
NEGVOCALIZATION: 0
CONSOLABILITY: 0
NEGVOCALIZATION: 0
TOTALSCORE: 0
CONSOLABILITY: 0
BREATHING: 0
NEGVOCALIZATION: 0
FACIALEXPRESSION: 0
BODYLANGUAGE: 0
FACIALEXPRESSION: 0
BREATHING: 0
CONSOLABILITY: 0
BREATHING: 0
FACIALEXPRESSION: 0
TOTALSCORE: 0
BODYLANGUAGE: 0

## 2023-12-25 NOTE — PROGRESS NOTES
Bedside shift change report given to [employer] RN (oncoming nurse) by Caron Dunaway (offgoing nurse). Report included the following information Nurse Handoff Report, Intake/Output, MAR, and Recent Results.      -Pt had a calm night  -Encouraged pt to drink fluids  - Q2 turns done  -Labs drawn  -RN placed foam dressing on her buttocks  -Lower extremities elevated & heels are floated    At handoff report, pt is sleeping in bed with unlabored respirations. Bed alarm ON.

## 2023-12-26 VITALS
SYSTOLIC BLOOD PRESSURE: 141 MMHG | BODY MASS INDEX: 32.03 KG/M2 | OXYGEN SATURATION: 97 % | HEART RATE: 81 BPM | RESPIRATION RATE: 16 BRPM | WEIGHT: 163.14 LBS | HEIGHT: 60 IN | DIASTOLIC BLOOD PRESSURE: 55 MMHG | TEMPERATURE: 98.6 F

## 2023-12-26 LAB
ANION GAP SERPL CALC-SCNC: 3 MMOL/L (ref 5–15)
BASOPHILS # BLD: 0.1 K/UL (ref 0–0.1)
BASOPHILS NFR BLD: 1 % (ref 0–1)
BUN SERPL-MCNC: 44 MG/DL (ref 6–20)
BUN/CREAT SERPL: 41 (ref 12–20)
CALCIUM SERPL-MCNC: 8.9 MG/DL (ref 8.5–10.1)
CHLORIDE SERPL-SCNC: 101 MMOL/L (ref 97–108)
CO2 SERPL-SCNC: 32 MMOL/L (ref 21–32)
CREAT SERPL-MCNC: 1.08 MG/DL (ref 0.55–1.02)
DIFFERENTIAL METHOD BLD: ABNORMAL
EOSINOPHIL # BLD: 0.3 K/UL (ref 0–0.4)
EOSINOPHIL NFR BLD: 3 % (ref 0–7)
ERYTHROCYTE [DISTWIDTH] IN BLOOD BY AUTOMATED COUNT: 15.2 % (ref 11.5–14.5)
GLUCOSE SERPL-MCNC: 130 MG/DL (ref 65–100)
HCT VFR BLD AUTO: 24.6 % (ref 35–47)
HGB BLD-MCNC: 7.5 G/DL (ref 11.5–16)
IMM GRANULOCYTES # BLD AUTO: 0.2 K/UL (ref 0–0.04)
IMM GRANULOCYTES NFR BLD AUTO: 2 % (ref 0–0.5)
LYMPHOCYTES # BLD: 1.6 K/UL (ref 0.8–3.5)
LYMPHOCYTES NFR BLD: 18 % (ref 12–49)
MCH RBC QN AUTO: 30 PG (ref 26–34)
MCHC RBC AUTO-ENTMCNC: 30.5 G/DL (ref 30–36.5)
MCV RBC AUTO: 98.4 FL (ref 80–99)
MONOCYTES # BLD: 0.9 K/UL (ref 0–1)
MONOCYTES NFR BLD: 10 % (ref 5–13)
NEUTS SEG # BLD: 5.8 K/UL (ref 1.8–8)
NEUTS SEG NFR BLD: 66 % (ref 32–75)
NRBC # BLD: 0 K/UL (ref 0–0.01)
NRBC BLD-RTO: 0 PER 100 WBC
PLATELET # BLD AUTO: 490 K/UL (ref 150–400)
PMV BLD AUTO: 9.6 FL (ref 8.9–12.9)
POTASSIUM SERPL-SCNC: 3.8 MMOL/L (ref 3.5–5.1)
RBC # BLD AUTO: 2.5 M/UL (ref 3.8–5.2)
SODIUM SERPL-SCNC: 136 MMOL/L (ref 136–145)
VANCOMYCIN SERPL-MCNC: 20.2 UG/ML
WBC # BLD AUTO: 8.9 K/UL (ref 3.6–11)

## 2023-12-26 PROCEDURE — 36415 COLL VENOUS BLD VENIPUNCTURE: CPT

## 2023-12-26 PROCEDURE — 2580000003 HC RX 258: Performed by: INTERNAL MEDICINE

## 2023-12-26 PROCEDURE — 6360000002 HC RX W HCPCS: Performed by: INTERNAL MEDICINE

## 2023-12-26 PROCEDURE — 80202 ASSAY OF VANCOMYCIN: CPT

## 2023-12-26 PROCEDURE — 85025 COMPLETE CBC W/AUTO DIFF WBC: CPT

## 2023-12-26 PROCEDURE — 6370000000 HC RX 637 (ALT 250 FOR IP): Performed by: INTERNAL MEDICINE

## 2023-12-26 PROCEDURE — 80048 BASIC METABOLIC PNL TOTAL CA: CPT

## 2023-12-26 PROCEDURE — 6370000000 HC RX 637 (ALT 250 FOR IP): Performed by: NURSE PRACTITIONER

## 2023-12-26 RX ORDER — FUROSEMIDE 40 MG/1
40 TABLET ORAL 2 TIMES DAILY
Qty: 60 TABLET | Refills: 1 | Status: SHIPPED | OUTPATIENT
Start: 2023-12-26

## 2023-12-26 RX ADMIN — AMPICILLIN SODIUM AND SULBACTAM SODIUM 3000 MG: 2; 1 INJECTION, POWDER, FOR SOLUTION INTRAMUSCULAR; INTRAVENOUS at 02:00

## 2023-12-26 RX ADMIN — AMLODIPINE BESYLATE 2.5 MG: 2.5 TABLET ORAL at 09:45

## 2023-12-26 RX ADMIN — FUROSEMIDE 40 MG: 40 TABLET ORAL at 09:45

## 2023-12-26 RX ADMIN — ENOXAPARIN SODIUM 30 MG: 100 INJECTION SUBCUTANEOUS at 09:45

## 2023-12-26 RX ADMIN — OXCARBAZEPINE 150 MG: 300 TABLET, FILM COATED ORAL at 09:44

## 2023-12-26 RX ADMIN — METOPROLOL SUCCINATE 25 MG: 25 TABLET, EXTENDED RELEASE ORAL at 09:45

## 2023-12-26 RX ADMIN — SODIUM CHLORIDE, PRESERVATIVE FREE 10 ML: 5 INJECTION INTRAVENOUS at 09:45

## 2023-12-26 ASSESSMENT — PAIN SCALES - PAIN ASSESSMENT IN ADVANCED DEMENTIA (PAINAD)
BREATHING: 0
NEGVOCALIZATION: 0
FACIALEXPRESSION: 0
CONSOLABILITY: 0
BODYLANGUAGE: 0
TOTALSCORE: 0

## 2023-12-26 NOTE — DISCHARGE SUMMARY
Discharge Summary    Name: Cathy Jones  [id number]  YOB: 1927 (Age: 89 y.o.)   Date of Admission: 12/13/2023  Date of Discharge: 12/26/2023  Attending Physician: Waqas Griffin MD    Discharge Diagnosis:     Acute hypoxic respiratory failure due to HFrEF  HTN / HLD  RLE hematoma / skin tear requiring sutures  S/P fall  Suspected superimposed right leg cellulitis  Hyponatremia  Acute blood loss anemia from hematoma  Proteus mirabilis urinary tract infection    Consultations:  IP WOUND CARE NURSE CONSULT TO EVAL  IP CONSULT TO CARDIOLOGY  IP CONSULT TO PHYSICAL THERAPY  IP CONSULT TO CASE MANAGEMENT  IP WOUND CARE NURSE CONSULT TO EVAL  IP CONSULT TO GENERAL SURGERY  IP CONSULT TO PHARMACY      Brief Admission History/Reason for Admission Per Paige Amos MD:   Cathy Jones is a 89 y.o.  female with PMHx significant for hypertension, hyperlipidemia, history of stroke, trigeminal neuralgia presented to the hospital for evaluation of generalized weakness s/p fall, patient was found on the ground, denies any chest pain denies any headache, blood work was significant for low hemoglobin level 8.3, CTA chest was done and show moderate pleural effusion and mild pulmonary edema.     Brief Hospital Course by Main Problems:     Acute hypoxic respiratory failure due to HFrEF  HTN / HLD  -CTA chest:  no PE.  Moderate pleural effusions with adjacent atelectasis and mild pulmonary edema.  Patient was started on IV Lasix and cardiology was consulted.  Echocardiogram showed ejection fraction of 40 to 45%.  Cardiology recommended to continue Lasix and metoprolol and have outpatient follow-up in the clinic in 2 weeks.  Patient was able to be weaned off to room air.  Cardiology cleared the patient for discharge for outpatient follow-up.    Patient        RLE hematoma / skin tear requiring sutures  S/P fall  Suspected superimposed right leg cellulitis  -Patient was noted to have right  evening. What changed:   how much to take  when to take this     OXcarbazepine 150 MG tablet  Commonly known as: Trileptal  Take one tablet twice a day  What changed:   how much to take  how to take this  when to take this            CONTINUE taking these medications      acetaminophen 500 MG tablet  Commonly known as: TYLENOL     amLODIPine 2.5 MG tablet  Commonly known as: NORVASC     diclofenac sodium 1 % Gel  Commonly known as: VOLTAREN     metoprolol succinate 25 MG extended release tablet  Commonly known as: TOPROL XL  TAKE 1 TABLET BY MOUTH ONCE A DAY FOR HYPERTENSION     pravastatin 10 MG tablet  Commonly known as: PRAVACHOL            STOP taking these medications      aspirin 81 MG chewable tablet               Where to Get Your Medications        These medications were sent to [address] [place] [address],[address], [address]      Phone: [phone]   furosemide 40 MG tablet             DISPOSITION:    Home with Family:       Home with HH/PT/OT/RN:    SNF/LTC: Y   JING:    OTHER:            Code status: DNR    1. Recommended diet: low salt     2. Recommended activity: activity as tolerated    3. If you experience any of the following symptoms then please call your primary care physician or return to the emergency room if you cannot get hold of your doctor:    4. Wound Care:RLE trauma skin tear: daily, cleanse NS moist 4x4. Cover with Xeroform gauze, ABD pads and secure with rolled gauze kerlix and an ace wrap. Float heels and keep leg elevated in and out of bed. 5. Lab work: cbc and cmp in 1 week     6. Bring these papers with you to your follow up appointments.  The papers will help your doctors be sure to continue the care plan from the hospital.        Follow up with:   PCP : Ny Valencia MD    [facility])  [address]

## 2023-12-26 NOTE — DISCHARGE INSTRUCTIONS
Patient Discharge Instructions    Stormy George / [id number] : 1927    Admitted 2023 Discharged: 2023         DISCHARGE DIAGNOSIS:   Acute hypoxic respiratory failure due to HFrEF  HTN / HLD  RLE hematoma / skin tear requiring sutures  S/P fall  Suspected superimposed right leg cellulitis  Hyponatremia  Acute blood loss anemia from hematoma  Proteus mirabilis urinary tract infection      Take Home Medications     {Medication reconciliation information is now added to the patient's AVS automatically when it is printed. There is no need to use this SmartLink in discharge instructions. Highlight this text and delete it to clear this message}      General drug facts     If you have a very bad allergy, wear an allergy ID at all times. It is important that you take the medication exactly as they are prescribed. Keep your medication in the bottles provided by the pharmacist.  Keep a list of all your drugs (prescription, natural products, vitamins, OTC) with you. Give this list to your doctor. Do not take other medications without consulting your doctor. Do not share your drugs with others and do not take anyone else's drugs. Keep all drugs out of the reach of children and pets. Most drugs may be thrown away in household trash after mixing with coffee grounds or roshan litter and sealing in a plastic bag. Keep a list Call your doctor for help with any side effects. If in the [place] you may also call the FDA at 1-060-THZ-1551    Talk with the doctor before starting any new drug, including OTC, natural products, or vitamins. What to do at Home    1. Recommended diet: low salt     2. Recommended activity: activity as tolerated    3. If you experience any of the following symptoms then please call your primary care physician or return to the emergency room if you cannot get hold of your doctor:    4. Wound Care:RLE trauma skin tear: daily, cleanse NS moist 4x4.  Cover with Xeroform

## 2023-12-26 NOTE — CARE COORDINATION
Transition of Care Plan:    RUR: 18%  Prior Level of Functioning: needs assistance, senior care  Disposition: SNF placement- [facility]   If SNF or IPR: Date FOC offered: 12/26/23  Date [address] received: 12/26/23  Accepting facility: [facility]   Date authorization started with reference number: N/A  Date authorization received and expires: N/A  Follow up appointments: PCP, specialists as indicated after rehab  DME needed: available at facility   Transportation at discharge: [facility], 4:30PM  IM/IMM Medicare/ letter given: given  Is patient a [place] and connected with VA? no   If yes, was [employer] transfer form completed and VA notified? Caregiver Contact: Adama Yu (Daughter)  Discharge Caregiver contacted prior to discharge? yes  Care Conference needed? no  Barriers to discharge:  none    Pt accepted to [facility] and [address] [place] [address] for SNF placement. Daughter would like to move forward with [facility]- bed is available today for pt to admit. Pt assigned to room#9335. RN to call report to [phone]. CM secured [facility], ETA 4:30PM. PCS and DNR placed into chart. 2nd IM and 2nd [address] letter given at bedside. Cleared for D/C from CM standpoint. 12/26/23 [address] [facility] Discharge   Transition of Care Consult (CM Consult) Discharge Planning   Services At/After Discharge [facility] (SNF); Transport   [employer] Information Provided? No   Mode of Transport at Discharge BLS  ([facility])   Hospital Transport Time of Discharge 1630   Confirm Follow Up Transport Family   Condition of Participation: Discharge Planning   The Plan for Transition of Care is related to the following treatment goals: SNF placement- [facility]   The Patient and/or Patient Representative was provided with a Choice of Provider?  Patient;Patient Representative   Name of the Patient Representative who Urinary Device  []PICC/Central Line  []Nebulizer  []Ventilator    Treatment:  []Isolation (for MRSA, VRE, etc.)  []Surgical Drain Management  []Tracheostomy Care  [x]Dressing Changes  []Dialysis with transportation  []PEG Care  []Oxygen  []Daily Weights for Heart Failure    Dietary:  [x]Any diet limitations low salt   []Tube Feedings   []Total Parenteral Management (TPN)    Financial Resources:  []Medicaid Application Completed    []UAI Completed and copy given to pt/family  and copy given to pt/family  []A screening has previously been completed.    []Level II Completed    [x] Private pay individual who will not become   financially eligible for Medicaid within 6 months from admission to a [facility].     [] Individual refused to have screening conducted.     []Medicaid Application Completed    []The screening denied because it was determined individual did not need/did not qualify for nursing facility level of care.  [] Out of state residents seeking direct admission to a VA nursing facility.  [] Individuals who are inpatients of an out of state hospital, or in state or out of state veterans/ hospital and seek direct admission to a VA nursing facility  [] Individuals who are pateints or residents of a state owned/operated facility that is licensed by Department of Behavioral Services (DBHDS) and seek direct admission to VA nursing facility  [] A screening not required for enrollment in Medicaid Hospice services as set out in 12 VAC 30-  [] [facility] ([facility]) staff shall perform screenings of the [facility] clients.    Advanced Care Plan:  []Surrogate Decision Maker of Care  [x]POA  [x]Communicated Code Status and copy sent.    Other:          OSMAR Reveles   Care Manager, [facility]  [phone]

## 2023-12-26 NOTE — PLAN OF CARE
Problem: Skin/Tissue Integrity  Goal: Absence of new skin breakdown  Description: 1. Monitor for areas of redness and/or skin breakdown  2. Assess vascular access sites hourly  3. Every 4-6 hours minimum:  Change oxygen saturation probe site  4. Every 4-6 hours:  If on nasal continuous positive airway pressure, respiratory therapy assess nares and determine need for appliance change or resting period.   12/25/2023 2359 by Mitchel Avilez RN  Outcome: Progressing  12/25/2023 1009 by Lorena Aggarwal RN  Outcome: Progressing     Problem: Discharge Planning  Goal: Discharge to home or other facility with appropriate resources  12/25/2023 2359 by Mitchel Avilez RN  Outcome: Progressing  12/25/2023 1009 by Lorena Aggarwal RN  Outcome: Progressing     Problem: Pain  Goal: Verbalizes/displays adequate comfort level or baseline comfort level  12/25/2023 2359 by Mitchel Avilez RN  Outcome: Progressing  12/25/2023 1009 by Lorena Aggarwal RN  Outcome: Progressing     Problem: Safety - Adult  Goal: Free from fall injury  12/25/2023 2359 by Mitchel Avilez RN  Outcome: Progressing  12/25/2023 1009 by Lorena Aggarwal RN  Outcome: Progressing     Problem: Chronic Conditions and Co-morbidities  Goal: Patient's chronic conditions and co-morbidity symptoms are monitored and maintained or improved  12/25/2023 2359 by Mitchel Avilez RN  Outcome: Progressing  12/25/2023 1009 by Lorena Aggarwal RN  Outcome: Progressing

## 2023-12-26 NOTE — PROGRESS NOTES
Hospitalist Progress Note    NAME: Cathy Jones   : 1927   MRN: [id number]     Date/Time: 2023 11:23 PM  Patient PCP: RADHA Mcnamara IV, MD    Estimated discharge date:    Anticipated Disposition / Barriers: Improvement of wbc    Assessment / Plan:     Acute hypoxic respiratory failure due to HFrEF  HTN / HLD  -CTA chest:  no PE.  Moderate pleural effusions with adjacent atelectasis and mild pulmonary  edema, increased compared to 2023.  -Echo EF 40-45%, mod MR  -NT proBNP 13K  -Change Lasix to 40 mg IV twice daily as she is retaining more fluid as evidenced by hyponatremia  -continue metoprolol and statin   -She is currently on room air  -Will repeat a chest x-ray    RLE hematoma / skin tear requiring sutures  S/P fall  Suspected superimposed right leg cellulitis  -holding asa and lovenox - hg is now stable at 8.6  -Sutures were placed in the ED on  and were removed yesterday as they were giving away  -Wound care recommended outpatient follow-up with [facility] wound care center  -WBC count is improving.  Continue vancomycin and Unasyn for now.  -Blood cultures negative so far  -Leukocytosis is now trending down.  Continue antibiotics as above.  -Check CBC in a.m.  -DC to rehab in a.m. tomorrow    Hyponatremia  -Sodium is improved.  Continue p.o. Lasix    Acute blood loss anemia from hematoma  -Hemoglobin is 7.2 this morning.  Hemoglobin is now close to baseline    Proteus mirabilis urinary tract infection  -S/p completion of Rocephin      Medical Decision Making:   I personally reviewed labs: Hemoglobin due to anemia  I personally reviewed imaging:  Toxic drug monitoring:   Discussed case with: pharmacy and     Central Line:      Code status: DNR  Prophylaxis: Lovenox - held due to anemia    Subjective:     Chief Complaint / Reason for Physician Visit  She had 1 episode of borderline fever of 99.7.  She looks good.  No fever.  Daughter present at  creation of Plan. LABS:  I reviewed today's most current labs and imaging studies.   Pertinent labs include:  Recent Labs     12/23/23  0109 12/24/23  0606 12/25/23  0624   WBC 15.3* 12.4* 11.0   HGB 7.7* 7.2* 7.1*   HCT 24.1* 23.3* 22.9*   * 401* 421*       Recent Labs     12/23/23  0109 12/24/23  0606 12/25/23  0624    135* 136   K 3.3* 3.9 3.9    97 99   CO2 25 32 31   BUN 49* 58* 49*

## 2023-12-26 NOTE — PROGRESS NOTES
Bedside shift change report given to [address] (oncoming nurse) by Araceli Capps (offgoing nurse). Report included the following information Nurse Handoff Report, Intake/Output, MAR, and Recent Results. Pt slept most of the night  Turning done  Heels floated  Labs drawn    At handoff report, pt is awake in bed. No complaints at this time.

## 2024-01-01 ENCOUNTER — HOSPITAL ENCOUNTER (INPATIENT)
Facility: HOSPITAL | Age: 89
LOS: 11 days | End: 2024-02-24
Attending: STUDENT IN AN ORGANIZED HEALTH CARE EDUCATION/TRAINING PROGRAM | Admitting: HOSPITALIST
Payer: MEDICARE

## 2024-01-01 ENCOUNTER — APPOINTMENT (OUTPATIENT)
Facility: HOSPITAL | Age: 89
End: 2024-01-01
Payer: MEDICARE

## 2024-01-01 ENCOUNTER — APPOINTMENT (OUTPATIENT)
Facility: HOSPITAL | Age: 89
End: 2024-01-01
Attending: HOSPITALIST
Payer: MEDICARE

## 2024-01-01 VITALS
DIASTOLIC BLOOD PRESSURE: 37 MMHG | SYSTOLIC BLOOD PRESSURE: 86 MMHG | OXYGEN SATURATION: 91 % | BODY MASS INDEX: 32.9 KG/M2 | TEMPERATURE: 94.3 F | HEIGHT: 60 IN | WEIGHT: 167.6 LBS | RESPIRATION RATE: 12 BRPM | HEART RATE: 49 BPM

## 2024-01-01 DIAGNOSIS — I50.43 CHF (CONGESTIVE HEART FAILURE), NYHA CLASS I, ACUTE ON CHRONIC, COMBINED (HCC): ICD-10-CM

## 2024-01-01 DIAGNOSIS — I50.9 ACUTE ON CHRONIC CONGESTIVE HEART FAILURE, UNSPECIFIED HEART FAILURE TYPE (HCC): ICD-10-CM

## 2024-01-01 DIAGNOSIS — R65.21 SEPSIS WITH ACUTE RENAL FAILURE AND SEPTIC SHOCK, DUE TO UNSPECIFIED ORGANISM, UNSPECIFIED ACUTE RENAL FAILURE TYPE (HCC): Primary | ICD-10-CM

## 2024-01-01 DIAGNOSIS — N39.0 UTI (URINARY TRACT INFECTION), BACTERIAL: ICD-10-CM

## 2024-01-01 DIAGNOSIS — A49.9 UTI (URINARY TRACT INFECTION), BACTERIAL: ICD-10-CM

## 2024-01-01 DIAGNOSIS — J81.0 ACUTE PULMONARY EDEMA (HCC): ICD-10-CM

## 2024-01-01 DIAGNOSIS — A41.9 SEPSIS WITH ACUTE RENAL FAILURE AND SEPTIC SHOCK, DUE TO UNSPECIFIED ORGANISM, UNSPECIFIED ACUTE RENAL FAILURE TYPE (HCC): Primary | ICD-10-CM

## 2024-01-01 DIAGNOSIS — N17.9 SEPSIS WITH ACUTE RENAL FAILURE AND SEPTIC SHOCK, DUE TO UNSPECIFIED ORGANISM, UNSPECIFIED ACUTE RENAL FAILURE TYPE (HCC): Primary | ICD-10-CM

## 2024-01-01 LAB
ALBUMIN SERPL-MCNC: 2.6 G/DL (ref 3.5–5)
ALBUMIN SERPL-MCNC: 2.6 G/DL (ref 3.5–5)
ALBUMIN SERPL-MCNC: 2.8 G/DL (ref 3.5–5)
ALBUMIN/GLOB SERPL: 0.5 (ref 1.1–2.2)
ALP SERPL-CCNC: 133 U/L (ref 45–117)
ALP SERPL-CCNC: 181 U/L (ref 45–117)
ALP SERPL-CCNC: 186 U/L (ref 45–117)
ALT SERPL-CCNC: 14 U/L (ref 12–78)
ALT SERPL-CCNC: 20 U/L (ref 12–78)
ALT SERPL-CCNC: 22 U/L (ref 12–78)
ANION GAP BLD CALC-SCNC: ABNORMAL (ref 10–20)
ANION GAP BLD CALC-SCNC: ABNORMAL (ref 10–20)
ANION GAP SERPL CALC-SCNC: 10 MMOL/L (ref 5–15)
ANION GAP SERPL CALC-SCNC: 12 MMOL/L (ref 5–15)
ANION GAP SERPL CALC-SCNC: 17 MMOL/L (ref 5–15)
ANION GAP SERPL CALC-SCNC: 6 MMOL/L (ref 5–15)
ANION GAP SERPL CALC-SCNC: 7 MMOL/L (ref 5–15)
ANION GAP SERPL CALC-SCNC: 9 MMOL/L (ref 5–15)
ANION GAP SERPL CALC-SCNC: 9 MMOL/L (ref 5–15)
APPEARANCE UR: ABNORMAL
AST SERPL-CCNC: 18 U/L (ref 15–37)
AST SERPL-CCNC: 32 U/L (ref 15–37)
AST SERPL-CCNC: 43 U/L (ref 15–37)
BACTERIA SPEC CULT: ABNORMAL
BACTERIA SPEC CULT: ABNORMAL
BACTERIA SPEC CULT: NORMAL
BACTERIA SPEC CULT: NORMAL
BACTERIA URNS QL MICRO: ABNORMAL /HPF
BASE DEFICIT BLD-SCNC: 2.2 MMOL/L
BASE EXCESS BLD CALC-SCNC: 2.9 MMOL/L
BASOPHILS # BLD: 0 K/UL (ref 0–0.1)
BASOPHILS # BLD: 0.1 K/UL (ref 0–0.1)
BASOPHILS NFR BLD: 0 % (ref 0–1)
BASOPHILS NFR BLD: 1 % (ref 0–1)
BILIRUB DIRECT SERPL-MCNC: 0.2 MG/DL (ref 0–0.2)
BILIRUB SERPL-MCNC: 0.5 MG/DL (ref 0.2–1)
BILIRUB SERPL-MCNC: 0.6 MG/DL (ref 0.2–1)
BILIRUB SERPL-MCNC: 0.6 MG/DL (ref 0.2–1)
BILIRUB UR QL: NEGATIVE
BUN SERPL-MCNC: 31 MG/DL (ref 6–20)
BUN SERPL-MCNC: 34 MG/DL (ref 6–20)
BUN SERPL-MCNC: 44 MG/DL (ref 6–20)
BUN SERPL-MCNC: 55 MG/DL (ref 6–20)
BUN SERPL-MCNC: 56 MG/DL (ref 6–20)
BUN SERPL-MCNC: 57 MG/DL (ref 6–20)
BUN SERPL-MCNC: 59 MG/DL (ref 6–20)
BUN/CREAT SERPL: 18 (ref 12–20)
BUN/CREAT SERPL: 23 (ref 12–20)
BUN/CREAT SERPL: 23 (ref 12–20)
BUN/CREAT SERPL: 24 (ref 12–20)
BUN/CREAT SERPL: 25 (ref 12–20)
BUN/CREAT SERPL: 28 (ref 12–20)
BUN/CREAT SERPL: 30 (ref 12–20)
CA-I BLD-MCNC: 1.04 MMOL/L (ref 1.12–1.32)
CA-I BLD-MCNC: 1.09 MMOL/L (ref 1.12–1.32)
CALCIUM SERPL-MCNC: 8 MG/DL (ref 8.5–10.1)
CALCIUM SERPL-MCNC: 8 MG/DL (ref 8.5–10.1)
CALCIUM SERPL-MCNC: 8.1 MG/DL (ref 8.5–10.1)
CALCIUM SERPL-MCNC: 8.1 MG/DL (ref 8.5–10.1)
CALCIUM SERPL-MCNC: 8.3 MG/DL (ref 8.5–10.1)
CALCIUM SERPL-MCNC: 8.6 MG/DL (ref 8.5–10.1)
CALCIUM SERPL-MCNC: 8.9 MG/DL (ref 8.5–10.1)
CC UR VC: ABNORMAL
CHLORIDE BLD-SCNC: 103 MMOL/L (ref 100–108)
CHLORIDE BLD-SCNC: 97 MMOL/L (ref 100–108)
CHLORIDE SERPL-SCNC: 103 MMOL/L (ref 97–108)
CHLORIDE SERPL-SCNC: 104 MMOL/L (ref 97–108)
CHLORIDE SERPL-SCNC: 107 MMOL/L (ref 97–108)
CHLORIDE SERPL-SCNC: 108 MMOL/L (ref 97–108)
CHLORIDE SERPL-SCNC: 109 MMOL/L (ref 97–108)
CHLORIDE SERPL-SCNC: 109 MMOL/L (ref 97–108)
CHLORIDE SERPL-SCNC: 97 MMOL/L (ref 97–108)
CO2 BLD-SCNC: 23 MMOL/L (ref 19–24)
CO2 BLD-SCNC: 28 MMOL/L (ref 19–24)
CO2 SERPL-SCNC: 16 MMOL/L (ref 21–32)
CO2 SERPL-SCNC: 23 MMOL/L (ref 21–32)
CO2 SERPL-SCNC: 23 MMOL/L (ref 21–32)
CO2 SERPL-SCNC: 24 MMOL/L (ref 21–32)
CO2 SERPL-SCNC: 25 MMOL/L (ref 21–32)
CO2 SERPL-SCNC: 27 MMOL/L (ref 21–32)
CO2 SERPL-SCNC: 28 MMOL/L (ref 21–32)
COLOR UR: ABNORMAL
CREAT SERPL-MCNC: 1.27 MG/DL (ref 0.55–1.02)
CREAT SERPL-MCNC: 1.46 MG/DL (ref 0.55–1.02)
CREAT SERPL-MCNC: 1.85 MG/DL (ref 0.55–1.02)
CREAT SERPL-MCNC: 1.94 MG/DL (ref 0.55–1.02)
CREAT SERPL-MCNC: 2.39 MG/DL (ref 0.55–1.02)
CREAT SERPL-MCNC: 2.43 MG/DL (ref 0.55–1.02)
CREAT SERPL-MCNC: 2.44 MG/DL (ref 0.55–1.02)
CREAT UR-MCNC: 2 MG/DL (ref 0.6–1.3)
CREAT UR-MCNC: 2.1 MG/DL (ref 0.6–1.3)
DIFFERENTIAL METHOD BLD: ABNORMAL
ECHO AO ROOT DIAM: 3.5 CM
ECHO AO ROOT INDEX: 2.05 CM/M2
ECHO AV AREA PEAK VELOCITY: 2.1 CM2
ECHO AV AREA/BSA PEAK VELOCITY: 1.2 CM2/M2
ECHO AV PEAK GRADIENT: 6 MMHG
ECHO AV PEAK VELOCITY: 1.2 M/S
ECHO AV VELOCITY RATIO: 0.5
ECHO BSA: 1.77 M2
ECHO LA DIAMETER INDEX: 2.69 CM/M2
ECHO LA DIAMETER: 4.6 CM
ECHO LA TO AORTIC ROOT RATIO: 1.31
ECHO LV FRACTIONAL SHORTENING: 23 % (ref 28–44)
ECHO LV INTERNAL DIMENSION DIASTOLE INDEX: 3.1 CM/M2
ECHO LV INTERNAL DIMENSION DIASTOLIC: 5.3 CM (ref 3.9–5.3)
ECHO LV INTERNAL DIMENSION SYSTOLIC INDEX: 2.4 CM/M2
ECHO LV INTERNAL DIMENSION SYSTOLIC: 4.1 CM
ECHO LV IVSD: 1.3 CM (ref 0.6–0.9)
ECHO LV MASS 2D: 242 G (ref 67–162)
ECHO LV MASS INDEX 2D: 141.5 G/M2 (ref 43–95)
ECHO LV POSTERIOR WALL DIASTOLIC: 1 CM (ref 0.6–0.9)
ECHO LV RELATIVE WALL THICKNESS RATIO: 0.38
ECHO LVOT AREA: 4.2 CM2
ECHO LVOT DIAM: 2.3 CM
ECHO LVOT PEAK GRADIENT: 1 MMHG
ECHO LVOT PEAK VELOCITY: 0.6 M/S
ECHO PV MAX VELOCITY: 0.7 M/S
ECHO PV PEAK GRADIENT: 2 MMHG
ECHO RV FREE WALL PEAK S': 5 CM/S
ECHO RV TAPSE: 0.9 CM (ref 1.7–?)
ECHO TV REGURGITANT MAX VELOCITY: 3.19 M/S
ECHO TV REGURGITANT PEAK GRADIENT: 41 MMHG
EKG ATRIAL RATE: 84 BPM
EKG DIAGNOSIS: NORMAL
EKG Q-T INTERVAL: 400 MS
EKG QRS DURATION: 154 MS
EKG QTC CALCULATION (BAZETT): 510 MS
EKG R AXIS: -20 DEGREES
EKG T AXIS: 172 DEGREES
EKG VENTRICULAR RATE: 98 BPM
EOSINOPHIL # BLD: 0 K/UL (ref 0–0.4)
EOSINOPHIL # BLD: 0.1 K/UL (ref 0–0.4)
EOSINOPHIL # BLD: 0.2 K/UL (ref 0–0.4)
EOSINOPHIL # BLD: 0.2 K/UL (ref 0–0.4)
EOSINOPHIL # BLD: 0.3 K/UL (ref 0–0.4)
EOSINOPHIL # BLD: 0.4 K/UL (ref 0–0.4)
EOSINOPHIL NFR BLD: 0 % (ref 0–7)
EOSINOPHIL NFR BLD: 1 % (ref 0–7)
EOSINOPHIL NFR BLD: 2 % (ref 0–7)
EOSINOPHIL NFR BLD: 3 % (ref 0–7)
EPITH CASTS URNS QL MICRO: ABNORMAL /LPF
ERYTHROCYTE [DISTWIDTH] IN BLOOD BY AUTOMATED COUNT: 16.4 % (ref 11.5–14.5)
ERYTHROCYTE [DISTWIDTH] IN BLOOD BY AUTOMATED COUNT: 16.8 % (ref 11.5–14.5)
ERYTHROCYTE [DISTWIDTH] IN BLOOD BY AUTOMATED COUNT: 17.4 % (ref 11.5–14.5)
ERYTHROCYTE [DISTWIDTH] IN BLOOD BY AUTOMATED COUNT: 18.5 % (ref 11.5–14.5)
ERYTHROCYTE [DISTWIDTH] IN BLOOD BY AUTOMATED COUNT: 18.9 % (ref 11.5–14.5)
ERYTHROCYTE [DISTWIDTH] IN BLOOD BY AUTOMATED COUNT: 18.9 % (ref 11.5–14.5)
FLUAV AG NPH QL IA: NEGATIVE
FLUBV AG NOSE QL IA: NEGATIVE
GLOBULIN SER CALC-MCNC: 5.5 G/DL (ref 2–4)
GLOBULIN SER CALC-MCNC: 5.6 G/DL (ref 2–4)
GLOBULIN SER CALC-MCNC: 5.6 G/DL (ref 2–4)
GLUCOSE BLD STRIP.AUTO-MCNC: 113 MG/DL (ref 74–106)
GLUCOSE BLD STRIP.AUTO-MCNC: 162 MG/DL (ref 74–106)
GLUCOSE SERPL-MCNC: 117 MG/DL (ref 65–100)
GLUCOSE SERPL-MCNC: 128 MG/DL (ref 65–100)
GLUCOSE SERPL-MCNC: 138 MG/DL (ref 65–100)
GLUCOSE SERPL-MCNC: 140 MG/DL (ref 65–100)
GLUCOSE SERPL-MCNC: 177 MG/DL (ref 65–100)
GLUCOSE SERPL-MCNC: 74 MG/DL (ref 65–100)
GLUCOSE SERPL-MCNC: 93 MG/DL (ref 65–100)
GLUCOSE UR STRIP.AUTO-MCNC: NEGATIVE MG/DL
HCO3 BLDA-SCNC: 24 MMOL/L
HCO3 BLDA-SCNC: 29 MMOL/L
HCT VFR BLD AUTO: 28.8 % (ref 35–47)
HCT VFR BLD AUTO: 30.1 % (ref 35–47)
HCT VFR BLD AUTO: 30.3 % (ref 35–47)
HCT VFR BLD AUTO: 32.2 % (ref 35–47)
HCT VFR BLD AUTO: 33 % (ref 35–47)
HCT VFR BLD AUTO: 35.1 % (ref 35–47)
HGB BLD-MCNC: 10 G/DL (ref 11.5–16)
HGB BLD-MCNC: 8.4 G/DL (ref 11.5–16)
HGB BLD-MCNC: 9.1 G/DL (ref 11.5–16)
HGB BLD-MCNC: 9.2 G/DL (ref 11.5–16)
HGB BLD-MCNC: 9.5 G/DL (ref 11.5–16)
HGB BLD-MCNC: 9.7 G/DL (ref 11.5–16)
HGB UR QL STRIP: ABNORMAL
HYALINE CASTS URNS QL MICRO: ABNORMAL /LPF (ref 0–2)
IMM GRANULOCYTES # BLD AUTO: 0 K/UL (ref 0–0.04)
IMM GRANULOCYTES # BLD AUTO: 0 K/UL (ref 0–0.04)
IMM GRANULOCYTES # BLD AUTO: 0.1 K/UL (ref 0–0.04)
IMM GRANULOCYTES # BLD AUTO: 0.1 K/UL (ref 0–0.04)
IMM GRANULOCYTES # BLD AUTO: 0.2 K/UL (ref 0–0.04)
IMM GRANULOCYTES # BLD AUTO: 0.2 K/UL (ref 0–0.04)
IMM GRANULOCYTES NFR BLD AUTO: 0 % (ref 0–0.5)
IMM GRANULOCYTES NFR BLD AUTO: 0 % (ref 0–0.5)
IMM GRANULOCYTES NFR BLD AUTO: 1 % (ref 0–0.5)
IMM GRANULOCYTES NFR BLD AUTO: 1 % (ref 0–0.5)
IMM GRANULOCYTES NFR BLD AUTO: 2 % (ref 0–0.5)
IMM GRANULOCYTES NFR BLD AUTO: 2 % (ref 0–0.5)
KETONES UR QL STRIP.AUTO: NEGATIVE MG/DL
LACTATE BLD-SCNC: 2.91 MMOL/L (ref 0.4–2)
LACTATE BLD-SCNC: 4.69 MMOL/L (ref 0.4–2)
LACTATE BLD-SCNC: 5.1 MMOL/L (ref 0.4–2)
LACTATE SERPL-SCNC: 1.7 MMOL/L (ref 0.4–2)
LACTATE SERPL-SCNC: 2.6 MMOL/L (ref 0.4–2)
LACTATE SERPL-SCNC: 3.6 MMOL/L (ref 0.4–2)
LACTATE SERPL-SCNC: 4.2 MMOL/L (ref 0.4–2)
LEUKOCYTE ESTERASE UR QL STRIP.AUTO: ABNORMAL
LIPASE SERPL-CCNC: 24 U/L (ref 13–75)
LYMPHOCYTES # BLD: 1 K/UL (ref 0.8–3.5)
LYMPHOCYTES # BLD: 1.1 K/UL (ref 0.8–3.5)
LYMPHOCYTES # BLD: 1.5 K/UL (ref 0.8–3.5)
LYMPHOCYTES # BLD: 1.6 K/UL (ref 0.8–3.5)
LYMPHOCYTES # BLD: 2 K/UL (ref 0.8–3.5)
LYMPHOCYTES # BLD: 2.4 K/UL (ref 0.8–3.5)
LYMPHOCYTES NFR BLD: 11 % (ref 12–49)
LYMPHOCYTES NFR BLD: 12 % (ref 12–49)
LYMPHOCYTES NFR BLD: 13 % (ref 12–49)
LYMPHOCYTES NFR BLD: 20 % (ref 12–49)
LYMPHOCYTES NFR BLD: 24 % (ref 12–49)
LYMPHOCYTES NFR BLD: 9 % (ref 12–49)
MAGNESIUM SERPL-MCNC: 2.1 MG/DL (ref 1.6–2.4)
MAGNESIUM SERPL-MCNC: 2.3 MG/DL (ref 1.6–2.4)
MAGNESIUM SERPL-MCNC: 2.3 MG/DL (ref 1.6–2.4)
MAGNESIUM SERPL-MCNC: 2.4 MG/DL (ref 1.6–2.4)
MAGNESIUM SERPL-MCNC: 2.4 MG/DL (ref 1.6–2.4)
MAGNESIUM SERPL-MCNC: 2.5 MG/DL (ref 1.6–2.4)
MCH RBC QN AUTO: 30.2 PG (ref 26–34)
MCH RBC QN AUTO: 30.4 PG (ref 26–34)
MCH RBC QN AUTO: 30.5 PG (ref 26–34)
MCH RBC QN AUTO: 30.7 PG (ref 26–34)
MCH RBC QN AUTO: 30.9 PG (ref 26–34)
MCH RBC QN AUTO: 31.4 PG (ref 26–34)
MCHC RBC AUTO-ENTMCNC: 28.5 G/DL (ref 30–36.5)
MCHC RBC AUTO-ENTMCNC: 29.2 G/DL (ref 30–36.5)
MCHC RBC AUTO-ENTMCNC: 29.4 G/DL (ref 30–36.5)
MCHC RBC AUTO-ENTMCNC: 29.5 G/DL (ref 30–36.5)
MCHC RBC AUTO-ENTMCNC: 30.2 G/DL (ref 30–36.5)
MCHC RBC AUTO-ENTMCNC: 30.4 G/DL (ref 30–36.5)
MCV RBC AUTO: 100.3 FL (ref 80–99)
MCV RBC AUTO: 100.7 FL (ref 80–99)
MCV RBC AUTO: 102.2 FL (ref 80–99)
MCV RBC AUTO: 105.1 FL (ref 80–99)
MCV RBC AUTO: 105.1 FL (ref 80–99)
MCV RBC AUTO: 110.4 FL (ref 80–99)
METAMYELOCYTES NFR BLD MANUAL: 1 %
MONOCYTES # BLD: 0.5 K/UL (ref 0–1)
MONOCYTES # BLD: 0.6 K/UL (ref 0–1)
MONOCYTES NFR BLD: 4 % (ref 5–13)
MONOCYTES NFR BLD: 5 % (ref 5–13)
MONOCYTES NFR BLD: 6 % (ref 5–13)
MONOCYTES NFR BLD: 6 % (ref 5–13)
NEUTS BAND NFR BLD MANUAL: 1 %
NEUTS SEG # BLD: 11.8 K/UL (ref 1.8–8)
NEUTS SEG # BLD: 6.6 K/UL (ref 1.8–8)
NEUTS SEG # BLD: 6.8 K/UL (ref 1.8–8)
NEUTS SEG # BLD: 7.4 K/UL (ref 1.8–8)
NEUTS SEG # BLD: 9 K/UL (ref 1.8–8)
NEUTS SEG # BLD: 9.9 K/UL (ref 1.8–8)
NEUTS SEG NFR BLD: 66 % (ref 32–75)
NEUTS SEG NFR BLD: 70 % (ref 32–75)
NEUTS SEG NFR BLD: 78 % (ref 32–75)
NEUTS SEG NFR BLD: 79 % (ref 32–75)
NEUTS SEG NFR BLD: 81 % (ref 32–75)
NEUTS SEG NFR BLD: 86 % (ref 32–75)
NITRITE UR QL STRIP.AUTO: POSITIVE
NRBC # BLD: 0.04 K/UL (ref 0–0.01)
NRBC # BLD: 0.05 K/UL (ref 0–0.01)
NRBC # BLD: 0.06 K/UL (ref 0–0.01)
NRBC # BLD: 0.06 K/UL (ref 0–0.01)
NRBC # BLD: 0.1 K/UL (ref 0–0.01)
NRBC # BLD: 0.4 K/UL (ref 0–0.01)
NRBC BLD-RTO: 0.4 PER 100 WBC
NRBC BLD-RTO: 0.4 PER 100 WBC
NRBC BLD-RTO: 0.6 PER 100 WBC
NRBC BLD-RTO: 0.6 PER 100 WBC
NRBC BLD-RTO: 0.7 PER 100 WBC
NRBC BLD-RTO: 3.5 PER 100 WBC
NT PRO BNP: ABNORMAL PG/ML
PCO2 BLDV: 43.9 MMHG (ref 41–51)
PCO2 BLDV: 47.4 MMHG (ref 41–51)
PH BLDV: 7.34 (ref 7.32–7.42)
PH BLDV: 7.39 (ref 7.32–7.42)
PH UR STRIP: 6 (ref 5–8)
PHOSPHATE SERPL-MCNC: 3.1 MG/DL (ref 2.6–4.7)
PHOSPHATE SERPL-MCNC: 3.5 MG/DL (ref 2.6–4.7)
PHOSPHATE SERPL-MCNC: 4.8 MG/DL (ref 2.6–4.7)
PHOSPHATE SERPL-MCNC: 5 MG/DL (ref 2.6–4.7)
PHOSPHATE SERPL-MCNC: 5.6 MG/DL (ref 2.6–4.7)
PLATELET # BLD AUTO: 169 K/UL (ref 150–400)
PLATELET # BLD AUTO: 203 K/UL (ref 150–400)
PLATELET # BLD AUTO: 212 K/UL (ref 150–400)
PLATELET # BLD AUTO: 216 K/UL (ref 150–400)
PLATELET # BLD AUTO: 223 K/UL (ref 150–400)
PLATELET # BLD AUTO: 89 K/UL (ref 150–400)
PMV BLD AUTO: 10.6 FL (ref 8.9–12.9)
PMV BLD AUTO: 10.9 FL (ref 8.9–12.9)
PMV BLD AUTO: 11.1 FL (ref 8.9–12.9)
PMV BLD AUTO: 11.2 FL (ref 8.9–12.9)
PMV BLD AUTO: 11.2 FL (ref 8.9–12.9)
PMV BLD AUTO: 11.5 FL (ref 8.9–12.9)
PO2 BLDV: <27 MMHG (ref 25–40)
PO2 BLDV: <27 MMHG (ref 25–40)
POTASSIUM BLD-SCNC: 4.6 MMOL/L (ref 3.5–5.5)
POTASSIUM BLD-SCNC: 4.8 MMOL/L (ref 3.5–5.5)
POTASSIUM SERPL-SCNC: 4 MMOL/L (ref 3.5–5.1)
POTASSIUM SERPL-SCNC: 4.4 MMOL/L (ref 3.5–5.1)
POTASSIUM SERPL-SCNC: 4.7 MMOL/L (ref 3.5–5.1)
POTASSIUM SERPL-SCNC: 4.8 MMOL/L (ref 3.5–5.1)
POTASSIUM SERPL-SCNC: 4.9 MMOL/L (ref 3.5–5.1)
POTASSIUM SERPL-SCNC: 5 MMOL/L (ref 3.5–5.1)
POTASSIUM SERPL-SCNC: 5.1 MMOL/L (ref 3.5–5.1)
PROT SERPL-MCNC: 8.1 G/DL (ref 6.4–8.2)
PROT SERPL-MCNC: 8.2 G/DL (ref 6.4–8.2)
PROT SERPL-MCNC: 8.4 G/DL (ref 6.4–8.2)
PROT UR STRIP-MCNC: 100 MG/DL
RBC # BLD AUTO: 2.74 M/UL (ref 3.8–5.2)
RBC # BLD AUTO: 2.99 M/UL (ref 3.8–5.2)
RBC # BLD AUTO: 3.02 M/UL (ref 3.8–5.2)
RBC # BLD AUTO: 3.14 M/UL (ref 3.8–5.2)
RBC # BLD AUTO: 3.15 M/UL (ref 3.8–5.2)
RBC # BLD AUTO: 3.18 M/UL (ref 3.8–5.2)
RBC #/AREA URNS HPF: ABNORMAL /HPF (ref 0–5)
RBC MORPH BLD: ABNORMAL
SARS-COV-2 RDRP RESP QL NAA+PROBE: NOT DETECTED
SERVICE CMNT-IMP: ABNORMAL
SERVICE CMNT-IMP: NORMAL
SERVICE CMNT-IMP: NORMAL
SODIUM BLD-SCNC: 135 MMOL/L (ref 136–145)
SODIUM BLD-SCNC: 137 MMOL/L (ref 136–145)
SODIUM SERPL-SCNC: 132 MMOL/L (ref 136–145)
SODIUM SERPL-SCNC: 137 MMOL/L (ref 136–145)
SODIUM SERPL-SCNC: 139 MMOL/L (ref 136–145)
SODIUM SERPL-SCNC: 140 MMOL/L (ref 136–145)
SODIUM SERPL-SCNC: 141 MMOL/L (ref 136–145)
SODIUM SERPL-SCNC: 142 MMOL/L (ref 136–145)
SODIUM SERPL-SCNC: 142 MMOL/L (ref 136–145)
SOURCE: NORMAL
SP GR UR REFRACTOMETRY: 1.01
SPECIMEN SITE: ABNORMAL
SPECIMEN SITE: ABNORMAL
TROPONIN I SERPL HS-MCNC: 99 NG/L (ref 0–51)
TSH SERPL DL<=0.05 MIU/L-ACNC: 3.73 UIU/ML (ref 0.36–3.74)
URINE CULTURE IF INDICATED: ABNORMAL
UROBILINOGEN UR QL STRIP.AUTO: 0.2 EU/DL (ref 0.2–1)
WBC # BLD AUTO: 11.4 K/UL (ref 3.6–11)
WBC # BLD AUTO: 11.5 K/UL (ref 3.6–11)
WBC # BLD AUTO: 14.6 K/UL (ref 3.6–11)
WBC # BLD AUTO: 9.3 K/UL (ref 3.6–11)
WBC # BLD AUTO: 9.7 K/UL (ref 3.6–11)
WBC # BLD AUTO: 9.9 K/UL (ref 3.6–11)
WBC URNS QL MICRO: >100 /HPF (ref 0–4)

## 2024-01-01 PROCEDURE — 51798 US URINE CAPACITY MEASURE: CPT

## 2024-01-01 PROCEDURE — 6370000000 HC RX 637 (ALT 250 FOR IP): Performed by: HOSPITALIST

## 2024-01-01 PROCEDURE — 6370000000 HC RX 637 (ALT 250 FOR IP): Performed by: NURSE PRACTITIONER

## 2024-01-01 PROCEDURE — 6360000002 HC RX W HCPCS: Performed by: STUDENT IN AN ORGANIZED HEALTH CARE EDUCATION/TRAINING PROGRAM

## 2024-01-01 PROCEDURE — 6360000002 HC RX W HCPCS: Performed by: HOSPITALIST

## 2024-01-01 PROCEDURE — 84132 ASSAY OF SERUM POTASSIUM: CPT

## 2024-01-01 PROCEDURE — 6370000000 HC RX 637 (ALT 250 FOR IP): Performed by: STUDENT IN AN ORGANIZED HEALTH CARE EDUCATION/TRAINING PROGRAM

## 2024-01-01 PROCEDURE — 92526 ORAL FUNCTION THERAPY: CPT

## 2024-01-01 PROCEDURE — 93306 TTE W/DOPPLER COMPLETE: CPT

## 2024-01-01 PROCEDURE — 85025 COMPLETE CBC W/AUTO DIFF WBC: CPT

## 2024-01-01 PROCEDURE — 82803 BLOOD GASES ANY COMBINATION: CPT

## 2024-01-01 PROCEDURE — 84443 ASSAY THYROID STIM HORMONE: CPT

## 2024-01-01 PROCEDURE — 87086 URINE CULTURE/COLONY COUNT: CPT

## 2024-01-01 PROCEDURE — 2700000000 HC OXYGEN THERAPY PER DAY

## 2024-01-01 PROCEDURE — 1100000000 HC RM PRIVATE

## 2024-01-01 PROCEDURE — 94760 N-INVAS EAR/PLS OXIMETRY 1: CPT

## 2024-01-01 PROCEDURE — 2060000000 HC ICU INTERMEDIATE R&B

## 2024-01-01 PROCEDURE — 83735 ASSAY OF MAGNESIUM: CPT

## 2024-01-01 PROCEDURE — 70450 CT HEAD/BRAIN W/O DYE: CPT

## 2024-01-01 PROCEDURE — 6370000000 HC RX 637 (ALT 250 FOR IP): Performed by: INTERNAL MEDICINE

## 2024-01-01 PROCEDURE — 83690 ASSAY OF LIPASE: CPT

## 2024-01-01 PROCEDURE — 83605 ASSAY OF LACTIC ACID: CPT

## 2024-01-01 PROCEDURE — 80048 BASIC METABOLIC PNL TOTAL CA: CPT

## 2024-01-01 PROCEDURE — 71045 X-RAY EXAM CHEST 1 VIEW: CPT

## 2024-01-01 PROCEDURE — 82947 ASSAY GLUCOSE BLOOD QUANT: CPT

## 2024-01-01 PROCEDURE — 84100 ASSAY OF PHOSPHORUS: CPT

## 2024-01-01 PROCEDURE — 36415 COLL VENOUS BLD VENIPUNCTURE: CPT

## 2024-01-01 PROCEDURE — 84484 ASSAY OF TROPONIN QUANT: CPT

## 2024-01-01 PROCEDURE — 2580000003 HC RX 258: Performed by: INTERNAL MEDICINE

## 2024-01-01 PROCEDURE — 2580000003 HC RX 258: Performed by: HOSPITALIST

## 2024-01-01 PROCEDURE — 99223 1ST HOSP IP/OBS HIGH 75: CPT | Performed by: NURSE PRACTITIONER

## 2024-01-01 PROCEDURE — 6370000000 HC RX 637 (ALT 250 FOR IP)

## 2024-01-01 PROCEDURE — 96375 TX/PRO/DX INJ NEW DRUG ADDON: CPT

## 2024-01-01 PROCEDURE — 97162 PT EVAL MOD COMPLEX 30 MIN: CPT

## 2024-01-01 PROCEDURE — 97530 THERAPEUTIC ACTIVITIES: CPT

## 2024-01-01 PROCEDURE — 2000000000 HC ICU R&B

## 2024-01-01 PROCEDURE — 99232 SBSQ HOSP IP/OBS MODERATE 35: CPT | Performed by: NURSE PRACTITIONER

## 2024-01-01 PROCEDURE — 2580000003 HC RX 258: Performed by: STUDENT IN AN ORGANIZED HEALTH CARE EDUCATION/TRAINING PROGRAM

## 2024-01-01 PROCEDURE — 74176 CT ABD & PELVIS W/O CONTRAST: CPT

## 2024-01-01 PROCEDURE — 2580000003 HC RX 258: Performed by: NURSE PRACTITIONER

## 2024-01-01 PROCEDURE — 2500000003 HC RX 250 WO HCPCS: Performed by: INTERNAL MEDICINE

## 2024-01-01 PROCEDURE — 87040 BLOOD CULTURE FOR BACTERIA: CPT

## 2024-01-01 PROCEDURE — 2580000003 HC RX 258: Performed by: GENERAL ACUTE CARE HOSPITAL

## 2024-01-01 PROCEDURE — 81001 URINALYSIS AUTO W/SCOPE: CPT

## 2024-01-01 PROCEDURE — 87635 SARS-COV-2 COVID-19 AMP PRB: CPT

## 2024-01-01 PROCEDURE — 96367 TX/PROPH/DG ADDL SEQ IV INF: CPT

## 2024-01-01 PROCEDURE — 2500000003 HC RX 250 WO HCPCS: Performed by: STUDENT IN AN ORGANIZED HEALTH CARE EDUCATION/TRAINING PROGRAM

## 2024-01-01 PROCEDURE — 82330 ASSAY OF CALCIUM: CPT

## 2024-01-01 PROCEDURE — 92610 EVALUATE SWALLOWING FUNCTION: CPT

## 2024-01-01 PROCEDURE — 6360000002 HC RX W HCPCS: Performed by: GENERAL ACUTE CARE HOSPITAL

## 2024-01-01 PROCEDURE — 84295 ASSAY OF SERUM SODIUM: CPT

## 2024-01-01 PROCEDURE — 80076 HEPATIC FUNCTION PANEL: CPT

## 2024-01-01 PROCEDURE — 6360000002 HC RX W HCPCS: Performed by: INTERNAL MEDICINE

## 2024-01-01 PROCEDURE — 87804 INFLUENZA ASSAY W/OPTIC: CPT

## 2024-01-01 PROCEDURE — 97110 THERAPEUTIC EXERCISES: CPT

## 2024-01-01 PROCEDURE — 99285 EMERGENCY DEPT VISIT HI MDM: CPT

## 2024-01-01 PROCEDURE — 87077 CULTURE AEROBIC IDENTIFY: CPT

## 2024-01-01 PROCEDURE — 93005 ELECTROCARDIOGRAM TRACING: CPT | Performed by: STUDENT IN AN ORGANIZED HEALTH CARE EDUCATION/TRAINING PROGRAM

## 2024-01-01 PROCEDURE — B24BZZZ ULTRASONOGRAPHY OF HEART WITH AORTA: ICD-10-PCS | Performed by: STUDENT IN AN ORGANIZED HEALTH CARE EDUCATION/TRAINING PROGRAM

## 2024-01-01 PROCEDURE — 87186 SC STD MICRODIL/AGAR DIL: CPT

## 2024-01-01 PROCEDURE — 83880 ASSAY OF NATRIURETIC PEPTIDE: CPT

## 2024-01-01 PROCEDURE — 80053 COMPREHEN METABOLIC PANEL: CPT

## 2024-01-01 PROCEDURE — 96365 THER/PROPH/DIAG IV INF INIT: CPT

## 2024-01-01 RX ORDER — MIDAZOLAM HYDROCHLORIDE 2 MG/2ML
2 INJECTION, SOLUTION INTRAMUSCULAR; INTRAVENOUS
Status: DISCONTINUED | OUTPATIENT
Start: 2024-01-01 | End: 2024-01-01

## 2024-01-01 RX ORDER — SODIUM CHLORIDE, SODIUM LACTATE, POTASSIUM CHLORIDE, AND CALCIUM CHLORIDE .6; .31; .03; .02 G/100ML; G/100ML; G/100ML; G/100ML
500 INJECTION, SOLUTION INTRAVENOUS ONCE
Status: COMPLETED | OUTPATIENT
Start: 2024-01-01 | End: 2024-01-01

## 2024-01-01 RX ORDER — MIDODRINE HYDROCHLORIDE 5 MG/1
10 TABLET ORAL
Status: DISCONTINUED | OUTPATIENT
Start: 2024-01-01 | End: 2024-01-01

## 2024-01-01 RX ORDER — SODIUM CHLORIDE 0.9 % (FLUSH) 0.9 %
5-40 SYRINGE (ML) INJECTION PRN
Status: DISCONTINUED | OUTPATIENT
Start: 2024-01-01 | End: 2024-01-01

## 2024-01-01 RX ORDER — 0.9 % SODIUM CHLORIDE 0.9 %
250 INTRAVENOUS SOLUTION INTRAVENOUS ONCE
Status: COMPLETED | OUTPATIENT
Start: 2024-01-01 | End: 2024-01-01

## 2024-01-01 RX ORDER — OXCARBAZEPINE 150 MG/1
150 TABLET, FILM COATED ORAL 2 TIMES DAILY
Status: DISCONTINUED | OUTPATIENT
Start: 2024-01-01 | End: 2024-01-01

## 2024-01-01 RX ORDER — 0.9 % SODIUM CHLORIDE 0.9 %
500 INTRAVENOUS SOLUTION INTRAVENOUS ONCE
Status: DISCONTINUED | OUTPATIENT
Start: 2024-01-01 | End: 2024-01-01

## 2024-01-01 RX ORDER — 0.9 % SODIUM CHLORIDE 0.9 %
500 INTRAVENOUS SOLUTION INTRAVENOUS ONCE
Status: COMPLETED | OUTPATIENT
Start: 2024-01-01 | End: 2024-01-01

## 2024-01-01 RX ORDER — SODIUM CHLORIDE 9 MG/ML
INJECTION, SOLUTION INTRAVENOUS PRN
Status: DISCONTINUED | OUTPATIENT
Start: 2024-01-01 | End: 2024-01-01

## 2024-01-01 RX ORDER — HEPARIN SODIUM 5000 [USP'U]/ML
5000 INJECTION, SOLUTION INTRAVENOUS; SUBCUTANEOUS EVERY 8 HOURS SCHEDULED
Status: DISCONTINUED | OUTPATIENT
Start: 2024-01-01 | End: 2024-01-01

## 2024-01-01 RX ORDER — ASPIRIN 81 MG/1
81 TABLET ORAL DAILY
COMMUNITY

## 2024-01-01 RX ORDER — NOREPINEPHRINE BITARTRATE 0.06 MG/ML
1-100 INJECTION, SOLUTION INTRAVENOUS CONTINUOUS
Status: DISCONTINUED | OUTPATIENT
Start: 2024-01-01 | End: 2024-01-01

## 2024-01-01 RX ORDER — METOPROLOL TARTRATE 1 MG/ML
5 INJECTION, SOLUTION INTRAVENOUS EVERY 6 HOURS PRN
Status: DISCONTINUED | OUTPATIENT
Start: 2024-01-01 | End: 2024-01-01

## 2024-01-01 RX ORDER — ACETAMINOPHEN 650 MG/1
650 SUPPOSITORY RECTAL EVERY 4 HOURS PRN
Status: DISCONTINUED | OUTPATIENT
Start: 2024-01-01 | End: 2024-02-25 | Stop reason: HOSPADM

## 2024-01-01 RX ORDER — HYDROMORPHONE HYDROCHLORIDE 1 MG/ML
1 SOLUTION ORAL EVERY 6 HOURS
Status: DISCONTINUED | OUTPATIENT
Start: 2024-01-01 | End: 2024-02-25 | Stop reason: HOSPADM

## 2024-01-01 RX ORDER — GLYCOPYRROLATE 0.2 MG/ML
0.2 INJECTION INTRAMUSCULAR; INTRAVENOUS EVERY 8 HOURS PRN
Status: DISCONTINUED | OUTPATIENT
Start: 2024-01-01 | End: 2024-01-01

## 2024-01-01 RX ORDER — LORAZEPAM 2 MG/ML
1 CONCENTRATE ORAL EVERY 4 HOURS PRN
Status: DISCONTINUED | OUTPATIENT
Start: 2024-01-01 | End: 2024-01-01

## 2024-01-01 RX ORDER — METOPROLOL SUCCINATE 25 MG/1
25 TABLET, EXTENDED RELEASE ORAL DAILY
Status: DISCONTINUED | OUTPATIENT
Start: 2024-01-01 | End: 2024-01-01

## 2024-01-01 RX ORDER — SODIUM CHLORIDE 0.9 % (FLUSH) 0.9 %
5-40 SYRINGE (ML) INJECTION EVERY 12 HOURS SCHEDULED
Status: DISCONTINUED | OUTPATIENT
Start: 2024-01-01 | End: 2024-01-01

## 2024-01-01 RX ORDER — SCOLOPAMINE TRANSDERMAL SYSTEM 1 MG/1
1 PATCH, EXTENDED RELEASE TRANSDERMAL
Status: DISCONTINUED | OUTPATIENT
Start: 2024-01-01 | End: 2024-02-25 | Stop reason: HOSPADM

## 2024-01-01 RX ORDER — MIDODRINE HYDROCHLORIDE 5 MG/1
5 TABLET ORAL
Status: DISCONTINUED | OUTPATIENT
Start: 2024-01-01 | End: 2024-01-01

## 2024-01-01 RX ORDER — ONDANSETRON 2 MG/ML
4 INJECTION INTRAMUSCULAR; INTRAVENOUS EVERY 6 HOURS PRN
Status: DISCONTINUED | OUTPATIENT
Start: 2024-01-01 | End: 2024-01-01

## 2024-01-01 RX ORDER — LORAZEPAM 2 MG/ML
1 CONCENTRATE ORAL
Status: DISCONTINUED | OUTPATIENT
Start: 2024-01-01 | End: 2024-02-25 | Stop reason: HOSPADM

## 2024-01-01 RX ORDER — ATROPINE SULFATE 10 MG/ML
2 SOLUTION/ DROPS OPHTHALMIC EVERY 4 HOURS PRN
Status: DISCONTINUED | OUTPATIENT
Start: 2024-01-01 | End: 2024-02-25 | Stop reason: HOSPADM

## 2024-01-01 RX ORDER — ACETAMINOPHEN 325 MG/1
650 TABLET ORAL EVERY 6 HOURS PRN
Status: DISCONTINUED | OUTPATIENT
Start: 2024-01-01 | End: 2024-01-01

## 2024-01-01 RX ORDER — SODIUM CHLORIDE 9 MG/ML
INJECTION, SOLUTION INTRAVENOUS CONTINUOUS
Status: DISCONTINUED | OUTPATIENT
Start: 2024-01-01 | End: 2024-01-01

## 2024-01-01 RX ORDER — HYDROMORPHONE HYDROCHLORIDE 1 MG/ML
2 SOLUTION ORAL
Status: DISCONTINUED | OUTPATIENT
Start: 2024-01-01 | End: 2024-02-25 | Stop reason: HOSPADM

## 2024-01-01 RX ORDER — POLYETHYLENE GLYCOL 3350 17 G/17G
17 POWDER, FOR SOLUTION ORAL DAILY PRN
Status: DISCONTINUED | OUTPATIENT
Start: 2024-01-01 | End: 2024-01-01

## 2024-01-01 RX ORDER — OXYCODONE HCL 20 MG/ML
5 CONCENTRATE, ORAL ORAL EVERY 4 HOURS PRN
Status: DISCONTINUED | OUTPATIENT
Start: 2024-01-01 | End: 2024-01-01

## 2024-01-01 RX ORDER — CASTOR OIL AND BALSAM, PERU 788; 87 MG/G; MG/G
OINTMENT TOPICAL 2 TIMES DAILY
Status: DISCONTINUED | OUTPATIENT
Start: 2024-01-01 | End: 2024-01-01

## 2024-01-01 RX ORDER — NOREPINEPHRINE BITARTRATE 0.06 MG/ML
.5-2 INJECTION, SOLUTION INTRAVENOUS CONTINUOUS
Status: DISCONTINUED | OUTPATIENT
Start: 2024-01-01 | End: 2024-01-01

## 2024-01-01 RX ORDER — PROMETHAZINE HYDROCHLORIDE 25 MG/1
25 SUPPOSITORY RECTAL EVERY 4 HOURS PRN
Status: DISCONTINUED | OUTPATIENT
Start: 2024-01-01 | End: 2024-02-25 | Stop reason: HOSPADM

## 2024-01-01 RX ORDER — SODIUM CHLORIDE, SODIUM LACTATE, POTASSIUM CHLORIDE, CALCIUM CHLORIDE 600; 310; 30; 20 MG/100ML; MG/100ML; MG/100ML; MG/100ML
INJECTION, SOLUTION INTRAVENOUS CONTINUOUS
Status: DISCONTINUED | OUTPATIENT
Start: 2024-01-01 | End: 2024-01-01

## 2024-01-01 RX ORDER — ACETAMINOPHEN 650 MG/1
650 SUPPOSITORY RECTAL EVERY 6 HOURS PRN
Status: DISCONTINUED | OUTPATIENT
Start: 2024-01-01 | End: 2024-01-01

## 2024-01-01 RX ORDER — ASPIRIN 81 MG/1
81 TABLET ORAL DAILY
Status: DISCONTINUED | OUTPATIENT
Start: 2024-01-01 | End: 2024-01-01

## 2024-01-01 RX ORDER — OXYCODONE HCL 5 MG/5 ML
5 SOLUTION, ORAL ORAL EVERY 6 HOURS
Status: DISCONTINUED | OUTPATIENT
Start: 2024-01-01 | End: 2024-01-01

## 2024-01-01 RX ORDER — HYDROMORPHONE HYDROCHLORIDE 1 MG/ML
0.5 INJECTION, SOLUTION INTRAMUSCULAR; INTRAVENOUS; SUBCUTANEOUS
Status: DISCONTINUED | OUTPATIENT
Start: 2024-01-01 | End: 2024-01-01

## 2024-01-01 RX ORDER — OXYCODONE HCL 20 MG/ML
5 CONCENTRATE, ORAL ORAL
Status: DISCONTINUED | OUTPATIENT
Start: 2024-01-01 | End: 2024-01-01

## 2024-01-01 RX ORDER — FUROSEMIDE 10 MG/ML
80 INJECTION INTRAMUSCULAR; INTRAVENOUS ONCE
Status: DISCONTINUED | OUTPATIENT
Start: 2024-01-01 | End: 2024-01-01

## 2024-01-01 RX ORDER — NOREPINEPHRINE BITARTRATE 0.06 MG/ML
.5-2 INJECTION, SOLUTION INTRAVENOUS CONTINUOUS
Status: ACTIVE | OUTPATIENT
Start: 2024-01-01 | End: 2024-01-01

## 2024-01-01 RX ORDER — ONDANSETRON 4 MG/1
4 TABLET, ORALLY DISINTEGRATING ORAL EVERY 8 HOURS PRN
Status: DISCONTINUED | OUTPATIENT
Start: 2024-01-01 | End: 2024-01-01

## 2024-01-01 RX ADMIN — SODIUM BICARBONATE 100 MEQ: 84 INJECTION, SOLUTION INTRAVENOUS at 12:55

## 2024-01-01 RX ADMIN — SODIUM CHLORIDE, PRESERVATIVE FREE 10 ML: 5 INJECTION INTRAVENOUS at 21:50

## 2024-01-01 RX ADMIN — AMIODARONE HYDROCHLORIDE 150 MG: 1.5 INJECTION, SOLUTION INTRAVENOUS at 16:29

## 2024-01-01 RX ADMIN — MICONAZOLE NITRATE: 20 CREAM TOPICAL at 21:40

## 2024-01-01 RX ADMIN — Medication 1 AMPULE: at 19:40

## 2024-01-01 RX ADMIN — SODIUM CHLORIDE, POTASSIUM CHLORIDE, SODIUM LACTATE AND CALCIUM CHLORIDE: 600; 310; 30; 20 INJECTION, SOLUTION INTRAVENOUS at 13:57

## 2024-01-01 RX ADMIN — SODIUM CHLORIDE, PRESERVATIVE FREE 10 ML: 5 INJECTION INTRAVENOUS at 08:24

## 2024-01-01 RX ADMIN — METOPROLOL SUCCINATE 25 MG: 25 TABLET, EXTENDED RELEASE ORAL at 15:53

## 2024-01-01 RX ADMIN — Medication 1 MG: at 09:13

## 2024-01-01 RX ADMIN — MIDODRINE HYDROCHLORIDE 10 MG: 5 TABLET ORAL at 12:58

## 2024-01-01 RX ADMIN — MIDODRINE HYDROCHLORIDE 5 MG: 5 TABLET ORAL at 09:11

## 2024-01-01 RX ADMIN — ASPIRIN 81 MG: 81 TABLET, COATED ORAL at 09:21

## 2024-01-01 RX ADMIN — SODIUM CHLORIDE 1000 MG: 900 INJECTION INTRAVENOUS at 16:33

## 2024-01-01 RX ADMIN — MIDODRINE HYDROCHLORIDE 5 MG: 5 TABLET ORAL at 16:31

## 2024-01-01 RX ADMIN — Medication 1 MG: at 22:10

## 2024-01-01 RX ADMIN — SODIUM CHLORIDE, POTASSIUM CHLORIDE, SODIUM LACTATE AND CALCIUM CHLORIDE: 600; 310; 30; 20 INJECTION, SOLUTION INTRAVENOUS at 18:06

## 2024-01-01 RX ADMIN — MIDODRINE HYDROCHLORIDE 5 MG: 5 TABLET ORAL at 12:28

## 2024-01-01 RX ADMIN — OXCARBAZEPINE 150 MG: 300 TABLET, FILM COATED ORAL at 09:21

## 2024-01-01 RX ADMIN — MICONAZOLE NITRATE: 20 CREAM TOPICAL at 08:25

## 2024-01-01 RX ADMIN — Medication: at 21:48

## 2024-01-01 RX ADMIN — SODIUM CHLORIDE 10 MCG/MIN: 9 INJECTION, SOLUTION INTRAVENOUS at 17:43

## 2024-01-01 RX ADMIN — Medication 1 AMPULE: at 08:24

## 2024-01-01 RX ADMIN — MICONAZOLE NITRATE: 20 CREAM TOPICAL at 09:22

## 2024-01-01 RX ADMIN — AZITHROMYCIN MONOHYDRATE 500 MG: 500 INJECTION, POWDER, LYOPHILIZED, FOR SOLUTION INTRAVENOUS at 16:42

## 2024-01-01 RX ADMIN — ONDANSETRON 4 MG: 2 INJECTION INTRAMUSCULAR; INTRAVENOUS at 08:37

## 2024-01-01 RX ADMIN — Medication 1 MG: at 17:00

## 2024-01-01 RX ADMIN — SODIUM CHLORIDE, PRESERVATIVE FREE 10 ML: 5 INJECTION INTRAVENOUS at 09:20

## 2024-01-01 RX ADMIN — MICONAZOLE NITRATE: 20 CREAM TOPICAL at 09:11

## 2024-01-01 RX ADMIN — ASPIRIN 81 MG: 81 TABLET, COATED ORAL at 11:49

## 2024-01-01 RX ADMIN — PHENYLEPHRINE HYDROCHLORIDE 15 MCG/MIN: 10 INJECTION INTRAVENOUS at 07:38

## 2024-01-01 RX ADMIN — SODIUM CHLORIDE, POTASSIUM CHLORIDE, SODIUM LACTATE AND CALCIUM CHLORIDE: 600; 310; 30; 20 INJECTION, SOLUTION INTRAVENOUS at 19:36

## 2024-01-01 RX ADMIN — Medication 1 MG: at 10:38

## 2024-01-01 RX ADMIN — SODIUM CHLORIDE 250 ML: 9 INJECTION, SOLUTION INTRAVENOUS at 04:17

## 2024-01-01 RX ADMIN — SODIUM CHLORIDE 500 ML: 9 INJECTION, SOLUTION INTRAVENOUS at 18:10

## 2024-01-01 RX ADMIN — ASPIRIN 81 MG: 81 TABLET, COATED ORAL at 08:23

## 2024-01-01 RX ADMIN — HEPARIN SODIUM 5000 UNITS: 5000 INJECTION INTRAVENOUS; SUBCUTANEOUS at 13:54

## 2024-01-01 RX ADMIN — SODIUM CHLORIDE, POTASSIUM CHLORIDE, SODIUM LACTATE AND CALCIUM CHLORIDE 500 ML: 600; 310; 30; 20 INJECTION, SOLUTION INTRAVENOUS at 11:54

## 2024-01-01 RX ADMIN — SODIUM CHLORIDE, PRESERVATIVE FREE 10 ML: 5 INJECTION INTRAVENOUS at 09:42

## 2024-01-01 RX ADMIN — Medication 1 MG: at 22:08

## 2024-01-01 RX ADMIN — MICONAZOLE NITRATE: 20 CREAM TOPICAL at 19:44

## 2024-01-01 RX ADMIN — HEPARIN SODIUM 5000 UNITS: 5000 INJECTION INTRAVENOUS; SUBCUTANEOUS at 07:13

## 2024-01-01 RX ADMIN — ASPIRIN 81 MG: 81 TABLET, COATED ORAL at 09:16

## 2024-01-01 RX ADMIN — HEPARIN SODIUM 5000 UNITS: 5000 INJECTION INTRAVENOUS; SUBCUTANEOUS at 21:51

## 2024-01-01 RX ADMIN — HEPARIN SODIUM 5000 UNITS: 5000 INJECTION INTRAVENOUS; SUBCUTANEOUS at 15:00

## 2024-01-01 RX ADMIN — Medication 1 AMPULE: at 09:42

## 2024-01-01 RX ADMIN — HEPARIN SODIUM 5000 UNITS: 5000 INJECTION INTRAVENOUS; SUBCUTANEOUS at 21:27

## 2024-01-01 RX ADMIN — LORAZEPAM 1 MG: 2 SOLUTION, CONCENTRATE ORAL at 10:23

## 2024-01-01 RX ADMIN — CEFEPIME 1000 MG: 1 INJECTION, POWDER, FOR SOLUTION INTRAMUSCULAR; INTRAVENOUS at 21:51

## 2024-01-01 RX ADMIN — MICONAZOLE NITRATE: 20 CREAM TOPICAL at 12:52

## 2024-01-01 RX ADMIN — Medication 1 AMPULE: at 08:55

## 2024-01-01 RX ADMIN — HEPARIN SODIUM 5000 UNITS: 5000 INJECTION INTRAVENOUS; SUBCUTANEOUS at 13:36

## 2024-01-01 RX ADMIN — SODIUM CHLORIDE: 9 INJECTION, SOLUTION INTRAVENOUS at 21:26

## 2024-01-01 RX ADMIN — Medication: at 22:35

## 2024-01-01 RX ADMIN — CEFEPIME 1000 MG: 1 INJECTION, POWDER, FOR SOLUTION INTRAMUSCULAR; INTRAVENOUS at 09:25

## 2024-01-01 RX ADMIN — Medication 1 MG: at 10:37

## 2024-01-01 RX ADMIN — OXCARBAZEPINE 150 MG: 300 TABLET, FILM COATED ORAL at 13:01

## 2024-01-01 RX ADMIN — Medication: at 09:12

## 2024-01-01 RX ADMIN — SODIUM CHLORIDE 500 ML: 9 INJECTION, SOLUTION INTRAVENOUS at 02:45

## 2024-01-01 RX ADMIN — OXCARBAZEPINE 150 MG: 300 TABLET, FILM COATED ORAL at 09:15

## 2024-01-01 RX ADMIN — Medication 1 MG: at 16:25

## 2024-01-01 RX ADMIN — SODIUM CHLORIDE, POTASSIUM CHLORIDE, SODIUM LACTATE AND CALCIUM CHLORIDE: 600; 310; 30; 20 INJECTION, SOLUTION INTRAVENOUS at 06:14

## 2024-01-01 RX ADMIN — Medication 1 MG: at 03:58

## 2024-01-01 RX ADMIN — ASPIRIN 81 MG: 81 TABLET, COATED ORAL at 09:11

## 2024-01-01 RX ADMIN — AMIODARONE HYDROCHLORIDE 0.5 MG/MIN: 50 INJECTION, SOLUTION INTRAVENOUS at 06:06

## 2024-01-01 RX ADMIN — MICONAZOLE NITRATE: 20 CREAM TOPICAL at 22:00

## 2024-01-01 RX ADMIN — OXCARBAZEPINE 150 MG: 300 TABLET, FILM COATED ORAL at 21:50

## 2024-01-01 RX ADMIN — ONDANSETRON 4 MG: 4 TABLET, ORALLY DISINTEGRATING ORAL at 13:34

## 2024-01-01 RX ADMIN — AMIODARONE HYDROCHLORIDE 1 MG/MIN: 50 INJECTION, SOLUTION INTRAVENOUS at 16:42

## 2024-01-01 RX ADMIN — Medication 1 MG: at 21:21

## 2024-01-01 RX ADMIN — CEFEPIME 1000 MG: 1 INJECTION, POWDER, FOR SOLUTION INTRAMUSCULAR; INTRAVENOUS at 09:11

## 2024-01-01 RX ADMIN — Medication 1 MG: at 15:41

## 2024-01-01 RX ADMIN — AZITHROMYCIN MONOHYDRATE 500 MG: 500 INJECTION, POWDER, LYOPHILIZED, FOR SOLUTION INTRAVENOUS at 15:37

## 2024-01-01 RX ADMIN — SODIUM CHLORIDE: 9 INJECTION, SOLUTION INTRAVENOUS at 21:48

## 2024-01-01 RX ADMIN — LORAZEPAM 1 MG: 2 SOLUTION, CONCENTRATE ORAL at 13:33

## 2024-01-01 RX ADMIN — AZITHROMYCIN MONOHYDRATE 500 MG: 500 INJECTION, POWDER, LYOPHILIZED, FOR SOLUTION INTRAVENOUS at 16:52

## 2024-01-01 RX ADMIN — CEFEPIME 1000 MG: 1 INJECTION, POWDER, FOR SOLUTION INTRAMUSCULAR; INTRAVENOUS at 21:27

## 2024-01-01 RX ADMIN — OXCARBAZEPINE 150 MG: 300 TABLET, FILM COATED ORAL at 21:51

## 2024-01-01 RX ADMIN — Medication: at 08:26

## 2024-01-01 RX ADMIN — WATER 2000 MG: 1 INJECTION INTRAMUSCULAR; INTRAVENOUS; SUBCUTANEOUS at 09:21

## 2024-01-01 RX ADMIN — MICONAZOLE NITRATE: 20 CREAM TOPICAL at 21:49

## 2024-01-01 RX ADMIN — MIDODRINE HYDROCHLORIDE 10 MG: 5 TABLET ORAL at 15:49

## 2024-01-01 RX ADMIN — Medication 1 MG: at 04:56

## 2024-01-01 RX ADMIN — HEPARIN SODIUM 5000 UNITS: 5000 INJECTION INTRAVENOUS; SUBCUTANEOUS at 06:36

## 2024-01-01 RX ADMIN — Medication 1 AMPULE: at 21:29

## 2024-01-01 RX ADMIN — MICONAZOLE NITRATE: 20 CREAM TOPICAL at 09:19

## 2024-01-01 RX ADMIN — Medication 1 AMPULE: at 09:12

## 2024-01-01 RX ADMIN — OXCARBAZEPINE 150 MG: 300 TABLET, FILM COATED ORAL at 08:23

## 2024-01-01 RX ADMIN — Medication 1 MG: at 09:52

## 2024-01-01 RX ADMIN — AMIODARONE HYDROCHLORIDE 0.5 MG/MIN: 50 INJECTION, SOLUTION INTRAVENOUS at 22:10

## 2024-01-01 RX ADMIN — CEFEPIME 1000 MG: 1 INJECTION, POWDER, FOR SOLUTION INTRAMUSCULAR; INTRAVENOUS at 22:00

## 2024-01-01 RX ADMIN — HEPARIN SODIUM 5000 UNITS: 5000 INJECTION INTRAVENOUS; SUBCUTANEOUS at 21:34

## 2024-01-01 RX ADMIN — SODIUM CHLORIDE, POTASSIUM CHLORIDE, SODIUM LACTATE AND CALCIUM CHLORIDE: 600; 310; 30; 20 INJECTION, SOLUTION INTRAVENOUS at 03:03

## 2024-01-01 RX ADMIN — MIDODRINE HYDROCHLORIDE 10 MG: 5 TABLET ORAL at 07:34

## 2024-01-01 RX ADMIN — SODIUM CHLORIDE, PRESERVATIVE FREE 10 ML: 5 INJECTION INTRAVENOUS at 23:01

## 2024-01-01 RX ADMIN — SODIUM CHLORIDE, POTASSIUM CHLORIDE, SODIUM LACTATE AND CALCIUM CHLORIDE: 600; 310; 30; 20 INJECTION, SOLUTION INTRAVENOUS at 09:13

## 2024-01-01 RX ADMIN — SODIUM CHLORIDE 500 ML: 9 INJECTION, SOLUTION INTRAVENOUS at 16:24

## 2024-01-01 RX ADMIN — SODIUM CHLORIDE 500 ML: 9 INJECTION, SOLUTION INTRAVENOUS at 11:50

## 2024-01-01 RX ADMIN — ONDANSETRON 4 MG: 2 INJECTION INTRAMUSCULAR; INTRAVENOUS at 15:46

## 2024-01-01 RX ADMIN — OXCARBAZEPINE 150 MG: 300 TABLET, FILM COATED ORAL at 09:11

## 2024-01-01 RX ADMIN — CEFTRIAXONE 1000 MG: 1 INJECTION, POWDER, FOR SOLUTION INTRAMUSCULAR; INTRAVENOUS at 16:41

## 2024-01-01 RX ADMIN — SODIUM CHLORIDE, PRESERVATIVE FREE 10 ML: 5 INJECTION INTRAVENOUS at 21:29

## 2024-01-01 RX ADMIN — Medication 1 AMPULE: at 21:47

## 2024-01-01 RX ADMIN — OXYCODONE HYDROCHLORIDE 5 MG: 20 SOLUTION ORAL at 11:33

## 2024-01-01 RX ADMIN — OXCARBAZEPINE 150 MG: 300 TABLET, FILM COATED ORAL at 21:29

## 2024-01-01 RX ADMIN — ATROPINE SULFATE 2 DROP: 10 SOLUTION/ DROPS OPHTHALMIC at 22:42

## 2024-01-01 RX ADMIN — Medication 1 AMPULE: at 09:19

## 2024-01-01 RX ADMIN — SODIUM CHLORIDE, PRESERVATIVE FREE 10 ML: 5 INJECTION INTRAVENOUS at 09:12

## 2024-01-01 RX ADMIN — ACETAMINOPHEN 650 MG: 325 TABLET ORAL at 15:49

## 2024-01-01 RX ADMIN — ATROPINE SULFATE 2 DROP: 10 SOLUTION/ DROPS OPHTHALMIC at 04:58

## 2024-01-01 RX ADMIN — Medication 1 MG: at 11:03

## 2024-01-01 RX ADMIN — Medication 1 MG: at 04:03

## 2024-01-01 RX ADMIN — AZITHROMYCIN MONOHYDRATE 500 MG: 500 INJECTION, POWDER, LYOPHILIZED, FOR SOLUTION INTRAVENOUS at 16:31

## 2024-01-01 RX ADMIN — SODIUM CHLORIDE, PRESERVATIVE FREE 10 ML: 5 INJECTION INTRAVENOUS at 09:22

## 2024-01-01 RX ADMIN — SODIUM CHLORIDE, PRESERVATIVE FREE 10 ML: 5 INJECTION INTRAVENOUS at 21:41

## 2024-01-01 RX ADMIN — HEPARIN SODIUM 5000 UNITS: 5000 INJECTION INTRAVENOUS; SUBCUTANEOUS at 06:14

## 2024-01-01 RX ADMIN — Medication 1 MG: at 21:12

## 2024-01-01 RX ADMIN — HEPARIN SODIUM 5000 UNITS: 5000 INJECTION INTRAVENOUS; SUBCUTANEOUS at 14:38

## 2024-01-01 RX ADMIN — SODIUM CHLORIDE, PRESERVATIVE FREE 10 ML: 5 INJECTION INTRAVENOUS at 19:44

## 2024-01-01 RX ADMIN — HEPARIN SODIUM 5000 UNITS: 5000 INJECTION INTRAVENOUS; SUBCUTANEOUS at 20:27

## 2024-01-01 RX ADMIN — OXCARBAZEPINE 150 MG: 300 TABLET, FILM COATED ORAL at 21:34

## 2024-01-01 RX ADMIN — Medication 1 AMPULE: at 21:40

## 2024-01-01 RX ADMIN — Medication 1 AMPULE: at 09:21

## 2024-01-01 RX ADMIN — HEPARIN SODIUM 5000 UNITS: 5000 INJECTION INTRAVENOUS; SUBCUTANEOUS at 15:54

## 2024-01-01 RX ADMIN — HEPARIN SODIUM 5000 UNITS: 5000 INJECTION INTRAVENOUS; SUBCUTANEOUS at 05:37

## 2024-01-01 RX ADMIN — Medication 1 MG: at 05:10

## 2024-01-01 ASSESSMENT — PAIN SCALES - GENERAL
PAINLEVEL_OUTOF10: 0
PAINLEVEL_OUTOF10: 2
PAINLEVEL_OUTOF10: 0
PAINLEVEL_OUTOF10: 5
PAINLEVEL_OUTOF10: 0
PAINLEVEL_OUTOF10: 1
PAINLEVEL_OUTOF10: 0
PAINLEVEL_OUTOF10: 1
PAINLEVEL_OUTOF10: 0

## 2024-01-01 ASSESSMENT — LIFESTYLE VARIABLES
HOW OFTEN DO YOU HAVE A DRINK CONTAINING ALCOHOL: NEVER
HOW MANY STANDARD DRINKS CONTAINING ALCOHOL DO YOU HAVE ON A TYPICAL DAY: PATIENT DOES NOT DRINK

## 2024-01-03 NOTE — PROGRESS NOTES
Physician Progress Note      PATIENT:               STEFANI ELAM  CSN #:                  [id number]  :                       1927  ADMIT DATE:       2023 8:58 AM  DISCH DATE:        2023 4:45 PM  RESPONDING  PROVIDER #:        Waqas Griffin MD          QUERY TEXT:    DR Griffin  Pt admitted with acute on chronic HFpEF . Pt noted to also have Echo(23)   with Mitral Valve: Moderate regurgitation. If possible, please document in   progress notes and discharge summary the etiology of CHF, if able to be   determined.        The medical record reflects the following:  [facility] Factors: [facility], HLD;  [facility]  Clinical Indicators: Echo(23) with Mitral Valve: Moderate regurgitation,   -CTA chest:  no PE.  Moderate pleural effusions with adjacent atelectasis and   mild pulmonary edema, increased compared to 2023; proBNP 19245  Treatment: Lasix iv, daily weights, cardiac consult  Options provided:  -- CHF due to Hypertensive Heart Disease  -- CHF due to Hypertensive Heart Disease and CAD  -- CHF not due to Hypertension but due to CAD  -- CHF due to Hypertensive Heart Disease and Valvular Heart Disease  -- CHF not due to Hypertension but due to valvular heart disease  -- Other - I will add my own diagnosis  -- Disagree - Not applicable / Not valid  -- Disagree - Clinically unable to determine / Unknown  -- Refer to Clinical Documentation Reviewer    PROVIDER RESPONSE TEXT:    This patient has CHF due to hypertensive heart disease.    Query created by: Brunilda Holliday on 2024 11:49 AM      QUERY TEXT:    Dr Griffin  Pt admitted with acute HFpEF.  Noted documentation of 'NSTEMI during   - admission' by ordered cardiac consultant's progress note on   .  If possible, please document in progress notes and discharge   summary:        The medical record reflects the following:  Risk Factors: HTN, CHF  Clinical Indicators:  cardiac consult : 'Admit  to 23 to w/   Hypoxic

## 2024-01-05 NOTE — PROGRESS NOTES
Physician Progress Note      PATIENT:               STEFANI ELAM  CSN #:                  [id number]  :                       1927  ADMIT DATE:       2023 8:58 AM  DISCH DATE:        2023 4:45 PM  RESPONDING  PROVIDER #:        Waqas Griffin MD          QUERY TEXT:    Patient admitted with acute HFpEF. Noted documentation of acute respiratory   failure in notes with no significant respiratory distress noted. In order to   support the diagnosis of acute respiratory failure, please include additional   clinical indicators in your documentation.  Or please document if the   diagnosis of acute respiratory failure has been ruled out after further study.    The medical record reflects the following:  Risk Factors: HTN, CHF,  Clinical Indicators: respiratory rates 22-31-16, sats 85% on room air on   arrival to ED, placed on 4-6 liters oxygen with sats 93-89%, acute CHF,  Treatment: Lasix iv, titrate oxygen as needed.            Acute Respiratory Failure Clinical Indicators per [employer] Training Guide and   Quick Reference Guide:  pO2 < 60 mmHg or SpO2 (pulse oximetry) < 91% breathing room air  pCO2 > 50 and pH < 7.35  P/F ratio (pO2 / FIO2) < 300  pO2 decrease or pCO2 increase by 10 mmHg from baseline (if known)  Supplemental oxygen of 40% or more  Presence of respiratory distress, tachypnea, dyspnea, shortness of breath,   wheezing  Unable to speak in complete sentences  Use of accessory muscles to breathe  Extreme anxiety and feeling of impending doom  Tripod position  Confusion/altered mental status/obtunded  Options provided:  -- Acute Respiratory Failure as evidenced by, Please document evidence.  -- Acute Respiratory Failure ruled out after study  -- Acute Respiratory Failure ruled out after study and Chronic Respiratory   Failure confirmed  -- Other - I will add my own diagnosis  -- Disagree - Not applicable / Not valid  -- Disagree - Clinically unable to determine / Unknown  -- Refer to

## 2024-02-13 PROBLEM — A41.9 SEPTIC SHOCK (HCC): Status: ACTIVE | Noted: 2024-01-01

## 2024-02-13 PROBLEM — R65.21 SEPTIC SHOCK (HCC): Status: ACTIVE | Noted: 2024-01-01

## 2024-02-13 NOTE — ED NOTES
1500 - Assumed care of pt. Pt consented to purewick. Purewick placed on pt.    1700 - Pt BP worsening hypotension. Provider notified.    1745 - Pt MAP of 33. Pt placed on levo and 2L NC per MD.     1910 - Bedside shift change report given to SHA Vargas (oncoming nurse) by SHA Valencia (offgoing nurse). Report included the following information Nurse Handoff Report, ED Encounter Summary, ED SBAR, and MAR.

## 2024-02-13 NOTE — ED PROVIDER NOTES
EMERGENCY DEPARTMENT HISTORY AND PHYSICAL EXAM      Date: 2/13/2024  Patient Name: Cathy Jones    History of Presenting Illness     Chief Complaint   Patient presents with    Altered Mental Status     BIBEMS from [facility].  Wound care came in to change a bandage on her right shin.  They noted her to be altered:  \"not her normal self\".  They also said she was hypotensive and bradycardic.  During transport, EMS noted the patient to be in afib with a pulse in the 110 range and SPO2 of 94%.  .  Pt has hx of previous stroke with right side deficit.           HPI: History From: Daughter, patient, History limited by: Speech deficit from prior stroke   Cathy Jones, 89 y.o. female presents to the ED with cc of low blood pressure at her assisted living facility.  Daughter states that she was discharged from rehab last week and now back to her assisted living facility.  Today, she was noted to have low blood pressures there was systolic in the 90s, per staff, she did not seem to be acting her normal self.  Daughter states that this is her recent neurologic baseline right now.  She denies any dysuria or urinary frequency, no diarrhea, no fevers, no abdominal pain.  She says she might have thrown up once or twice today.  Amlodipine was discontinued about a month ago given her pressures running in the 1 teens, otherwise no recent medication changes.  She has a chronic wound on the right lower extremity, she gets wound care for this.          There are no other complaints, changes, or physical findings at this time.    PCP: RADHA Mcnamara IV, MD    No current facility-administered medications on file prior to encounter.     Current Outpatient Medications on File Prior to Encounter   Medication Sig Dispense Refill    furosemide (LASIX) 40 MG tablet Take 1 tablet by mouth in the morning and 1 tablet in the evening. 60 tablet 1    OXcarbazepine (TRILEPTAL) 150 MG tablet Take one tablet twice a day (Patient taking

## 2024-02-13 NOTE — H&P
[facility] HPI.    Name: Cathy Jones   : 1927   MRN: [id number]   Date: 2024      Chief Complaint   Patient presents with    Altered Mental Status     BIBEMS from [facility].  Wound care came in to change a bandage on her right shin.  They noted her to be altered:  \"not her normal self\".  They also said she was hypotensive and bradycardic.  During transport, EMS noted the patient to be in afib with a pulse in the 110 range and SPO2 of 94%.  .  Pt has hx of previous stroke with right side deficit.       Reason for ICU:     Septic shock     HPI & Hospital course     Patient with history of HTN, HF has had 2 hospitalizations (2023 for Covid/ HF and December for RLE wound cellulitis) and has deteriorating functional status from walker to wheel chair. She currently lives at assisted living and and was sent to ER for evaluation of low BP there, not feeling well describing as generalized malaise/ weakness.   On arrival in ER, patient was hypotensive and lactate 2.6. UA showed pyuria and CXR with BL reticular opacities, PNA vs pulmonary edema. Patient given CTX and azithromycin and 1L normal saline. Started on Levophed.   At time of my assessment, patient has finished one litre saline and recevie ABX. She does not look in distress and is awake but slow to respond. Daughter present at bedside, report since patient has had stroke in 2016 she is slow in response and they do not note much decline in mental status. Patient was in her [facility] state of health yesterday and felt worse today.     Assessment & Plan     # Septic shock   # UTI   # Suspected Pneumonia vs cardiogenic pulmonary edema   # RLE chronic wound, healing well   # H/o Heart failure   # DAPHNIE, pre renal vs ATN  - Lactate 2.6 and BNP > 35K    - Given CTX/ Azithromycin in ED   - Given 1L normal saline in ER, further fluids held due to concern for exacerbating HF   - On levophed 15 at time of assessment   Plan  - Continue CTX/        Physical Exam  Constitutional:       Appearance: She is ill-appearing and toxic-appearing.   HENT:      Head: Normocephalic.      Mouth/Throat:      Mouth: Mucous membranes are moist.   Cardiovascular:      Rate and Rhythm: Tachycardia present.   Pulmonary:      Effort: Pulmonary effort is normal. No respiratory distress.      Breath sounds: No wheezing.   Musculoskeletal:      Right lower leg: No edema.      Left lower leg: No edema.   Neurological:      Mental Status: She is alert. She is disoriented.         Past Medical History:        has a past medical history of Arthritis, C1 cervical fracture ([facility]), Cancer ([facility]), DJD (degenerative joint disease) of knee, Hyperlipidemia, Hypertension complicating diabetes ([facility]), Ill-defined condition, Pressure ulcer of left buttock, stage 3 ([facility]), Stroke ([facility]), Trigeminal neuralgia, and Type 2 diabetes mellitus with complication, without long-term current use of insulin ([facility]).    Past Surgical History:      has a past surgical history that includes heent; gyn (1982); other surgical history; and IR GUIDED IVC FILTER PLACEMENT.      Home Medications:     Prior to Admission medications    Medication Sig Start Date End Date Taking? Authorizing Provider   furosemide (LASIX) 40 MG tablet Take 1 tablet by mouth in the morning and 1 tablet in the evening. 12/26/23   Waqas Griffin MD   OXcarbazepine (TRILEPTAL) 150 MG tablet Take one tablet twice a day  Patient taking differently: Take 1 tablet by mouth 2 times daily Take one tablet twice a day 11/13/23   RADHA Mcnamara IV, MD   metoprolol succinate (TOPROL XL) 25 MG extended release tablet TAKE 1 TABLET BY MOUTH ONCE A DAY FOR HYPERTENSION 10/11/23   RADHA Mcnamara IV, MD   acetaminophen (TYLENOL) 500 MG tablet Take by mouth every 6 hours as needed    Automatic Reconciliation, Ar   amLODIPine (NORVASC) 2.5 MG tablet TAKE 1 TABLET DAILY 4/11/22   Automatic Reconciliation, Ar   diclofenac sodium (VOLTAREN) 1 % GEL Apply 4 g

## 2024-02-14 NOTE — CARE COORDINATION
Care Management Initial Assessment       RUR: 22% High  Readmission? No  1st IM letter given? Yes - 2/13/2024  1st  letter given: Yes - 2/14/2024    Chart reviewed. CM met with the patient (who has altered mental status) and her daughter Tory Moore [phone] at bedside. The patient lives in [facility] and stays in a room alone. The patient is assisted with all ADLs/IDLs and has her medications, meals, and appointments managed at the facility..    02/14/24 4177   Service Assessment   Patient Orientation Unresponsive   Cognition Other (see comment)  (Tory Moore [phone] daughter)   History Provided By Child/Family  (Tory Moore [phone] daughter)   Primary Caregiver Private caregiver  (facility)   Accompanied By/Relationship Tory Moore [phone] daughter   Support Systems Children  (Tory Moore [phone] daughter)   Patient's Healthcare Decision Maker is: Named in Scanned ACP Document   PCP Verified by CM Yes  (Also sees Dr. Guillen who comes to facility)   Last Visit to PCP Within last 3 months   Prior Functional Level Assistance with the following:;Bathing;Dressing;Toileting;Feeding;Cooking;Mobility;Shopping;Housework   Current Functional Level Assistance with the following:;Bathing;Dressing;Toileting;Feeding;Cooking;Other (see comment);Mobility;Shopping;Housework  (progressively needs more assistance)   Can patient return to prior living arrangement Yes   Ability to make needs known: Poor   Family able to assist with home care needs: Yes   Would you like for me to discuss the discharge plan with any other family members/significant others, and if so, who? Yes  (Tory Moore [phone] daughter)   Financial Resources Medicare   Community Resources None   CM/SW Referral ADLs/IADLs;Activity/Exercise   Social/Functional History   Lives With Other (comment)  ([facility])   Type of Home Assisted living   Home

## 2024-02-14 NOTE — PROGRESS NOTES
2215: Pt arrived to the unit in room 2515. Pt admission assessment completed with findings documented, pt on 2 LPM NC, Levo @ 14 mcg/min, A&O x3.    0000: Reassessed pt, findings documented, pt repositioned.     0030: SpO2 100% on 4 LPM reduced O2 to 2 LPM, SpO2 97%.    0200: Rounded on pt, resting quietly in bed with eyes closed, pt repositioned.     0400: Reassessed pt, findings documented, pt repositioned.     0600: Rounded on pt resting quietly in bed with eyes closed, pt repositioned and morning labs obtained.     0630: No output from purwick, bladder scanned pt (see Flowsheet).    0700: Report given at bedside to SHA Villarreal in SBAR format with all questions and concerns addressed.

## 2024-02-14 NOTE — WOUND CARE
Wound Care consult for the right lower extremity wound and other smaller wounds that were present on admission. Chart reviewed and patient assessed. Pt. Seen while in the CCU today. She is 89 years old and on oxygen by nasal canula.   Admitted for septic shock / UTI. Hx of HTN but her blood pressure has been low, requiring vasopressors. 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   Past Medical History:   Diagnosis Date    Arthritis     RASHARD KNEES.    C1 cervical fracture ([facility]) 7/19/2013 6/9/13    Cancer (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) 09/08/2000    Ill-defined condition 6/2013    C1 FX AFTER FALL    Pressure ulcer of left buttock, stage 3 ([facility]) 10/19/2016    About 7/23/16    Stroke ([facility])     Trigeminal neuralgia 7/15/2011    Type 2 diabetes mellitus with complication, without long-term current use of insulin ([facility]) 11/23/2016     Assessment: She is alert and oriented x 3 but quiet. Pt. Has very dry skin - likely eczema with dry flaky skin on the face and other areas.   Pt. Fell at home a few days - about 10 days ago and hurt her right knee. This is a deep abrasion with epiboled edges. It has started developing a yellow slough covering over the wound bed.  The initial drainage is yellow and it wiped off but the slough is adherent. This needs to be debrided slowly. Pt's skin is paper thin.       Pt. Does have some healing yeast dermatitis in the folds of the groin / perineum.         There is a healing Hematoma on the left leg that just needs to be cleansed and left open to air.       Treatment: Ordered Secura Antifungal cream for the yeast dermatitis. Just need to cleanse and dry the skin and apply the cream to the skin and leave it open to air.     The right knee / lower leg wound:  Cleanse the wound with NS and wipe firmly to remove the old drainage and wound slough.  Apply a NS  moistened Hydrofera Blue dressing on top of the wound and then cover it with an ABD and wrap with a small roll conforming bruce. Tape and Date / time the dressing. Float the heels.   The heels are pink - red but are completely blanchable. No wounds noted.   Plan:  Pressure injury prevention with significant turns, keeping in mind that she has Hemiparesis on the right. Float both heels.   Wound care as ordered.   Barbara Villeda RN,BSN, CWON

## 2024-02-14 NOTE — PLAN OF CARE
Problem: Chronic Conditions and Co-morbidities  Goal: Patient's chronic conditions and co-morbidity symptoms are monitored and maintained or improved  Outcome: Progressing  Flowsheets (Taken 2/13/2024 2215)  Care Plan - Patient's Chronic Conditions and Co-Morbidity Symptoms are Monitored and Maintained or Improved: Monitor and assess patient's chronic conditions and comorbid symptoms for stability, deterioration, or improvement     Problem: Discharge Planning  Goal: Discharge to home or other facility with appropriate resources  Outcome: Progressing  Flowsheets (Taken 2/13/2024 2215)  Discharge to home or other facility with appropriate resources: Identify barriers to discharge with patient and caregiver     Problem: Cardiovascular - Adult  Goal: Optimize blood flow for wound healing  Description: Optimize blood flow for wound healing  Outcome: Progressing     Problem: Respiratory - Adult  Goal: Effective breathing pattern  Description: Effective breathing pattern  Outcome: Progressing     Problem: Genitourinary - Adult  Goal: *Absence of infection signs and symptoms  Outcome: Progressing     Problem: Metabolic/Fluid and Electrolytes - Adult  Goal: Absence of imbalanced fluid volume signs and symptoms  Description: Absence of imbalanced fluid volume signs and symptoms  Outcome: Progressing     Problem: Skin/Tissue Integrity - Adult  Goal: Absence of new skin breakdown  Description: Absence of new skin breakdown  Outcome: Progressing     Problem: Hematologic - Adult  Goal: Optimize blood flow for wound healing  Description: Optimize blood flow for wound healing  Outcome: Progressing     Problem: Safety - Adult  Goal: Absence of physical injury  Description: Absence of physical injury  Outcome: Progressing     Problem: Pain  Goal: Able to cope with pain  Description: Able to cope with pain  Outcome: Progressing     Problem: Skin/Tissue Integrity  Goal: Absence of new skin breakdown  Description: Absence of new skin  breakdown  Outcome: Progressing

## 2024-02-14 NOTE — PROGRESS NOTES
attended Interdisciplinary Rounds in CCU. The team discussed the patient's medical condition and care.      Rev. PARISH Lopez  [facility]   Paging Service [phone])

## 2024-02-14 NOTE — CARE COORDINATION
02/14/24 1099   Service Assessment   Patient Orientation Unresponsive   Cognition Other (see comment)  (Tory Moore [phone] daughter)   History Provided By Child/Family  (Tory Moore [phone] daughter)   Primary Caregiver Private caregiver  (facility)   Accompanied By/Relationship Tory Moore [phone] daughter   Support Systems Children  (Tory Moore [phone] daughter)   Patient's Healthcare Decision Maker is: Named in Scanned ACP Document   PCP Verified by CM Yes  (Also sees Dr. Guillen who comes to facility)   Last Visit to PCP Within last 3 months   Prior Functional Level Assistance with the following:;Bathing;Dressing;Toileting;Feeding;Cooking;Mobility;Shopping;Housework   Current Functional Level Assistance with the following:;Bathing;Dressing;Toileting;Feeding;Cooking;Other (see comment);Mobility;Shopping;Housework  (progressively needs more assistance)   Can patient return to prior living arrangement Yes   Ability to make needs known: Poor   Family able to assist with home care needs: Yes   Would you like for me to discuss the discharge plan with any other family members/significant others, and if so, who? Yes  (Tory Moore [phone] daughter)   Financial Resources Medicare   Community Resources None   CM/SW Referral ADLs/IADLs;Activity/Exercise   Social/Functional History   Lives With Other (comment)  ([facility])   Type of Home Assisted living   Home Layout One level   Home Access Ramped entrance;Elevator;Level entry   Bathroom Equipment Grab bars around toilet;Toilet raiser   Bathroom Accessibility Accessible   Home Equipment Wheelchair-manual;Rollator  (railings on bed)   Receives Help From Personal care attendant;Family  (per facility)   ADL Assistance Needs assistance   Bath Maximum assistance   Dressing Maximum assistance   Grooming Maximum assistance   Feeding Moderate assistance   Toileting Needs assistance   Homemaking

## 2024-02-14 NOTE — PROGRESS NOTES
Spiritual Care Assessment/Progress Note  [facility]    Name: Cathy Jones MRN: [id number]    Age: 89 y.o.     Sex: female   Language: English     Date: 2/14/2024            Total Time Calculated: 5 min              Spiritual Assessment begun in [facility]ARE  Service Provided For:: Family  Referral/Consult From:: Rounding  Encounter Overview/Reason : Initial Encounter    Spiritual beliefs:      [x] Involved in a isaiah tradition/spiritual practice:      [x] Supported by a isaiah community:      [] Claims no spiritual orientation:      [] Seeking spiritual identity:           [] Adheres to an individual form of spirituality:      [] Not able to assess:                Identified resources for coping and support system:   Support System: Children, Parent       [] Prayer                  [] Devotional reading               [] Music                  [] Guided Imagery     [] Pet visits                                        [] Other: (COMMENT)     Specific area/focus of visit   Encounter:    Crisis:    Spiritual/Emotional needs: Type: Spiritual Support  Ritual, Rites and Sacraments:    Grief, Loss, and Adjustments:    Ethics/Mediation:    Behavioral Health:    Palliative Care:    Advance Care Planning:      Plan/Referrals: Continue to visit, (comment), Continue Support (comment)    Narrative:  reviewed the patient's chart prior to the visit.  greeted the patient's daughter and mother when entering the room.  talked with them and affirmed their thoughts. The patient's mother expressed her love for her isaiah and Buddhist family.  provided a presence, encouragement and empathetic listening.    Spiritual Health Services are available 24 hours a day as requested.     Rev. PARISH Lopez  [facility]   Paging Service [phone])

## 2024-02-14 NOTE — PROGRESS NOTES
[facility] PROGRESS NOTE.      Name: Cathy Jones   : 1927   MRN: [id number]   Date: 2024      Chief Complaint   Patient presents with    Altered Mental Status     BIBEMS from [facility].  Wound care came in to change a bandage on her right shin.  They noted her to be altered:  \"not her normal self\".  They also said she was hypotensive and bradycardic.  During transport, EMS noted the patient to be in afib with a pulse in the 110 range and SPO2 of 94%.  .  Pt has hx of previous stroke with right side deficit.       Reason for ICU admission: Septic shock 2/2 UTI/ PNA    Hospital Course:     Patient with history of HTN, HF has had 2 hospitalizations (2023 for Covid/ HF and December for RLE wound cellulitis) and has deteriorating functional status from walker to wheel chair. She currently lives at assisted living and and was sent to ER for evaluation of low BP there, not feeling well describing as generalized malaise/ weakness.   On arrival in ER, patient was hypotensive and lactate 2.6. UA showed pyuria and CXR with BL reticular opacities, PNA vs pulmonary edema. Patient given CTX and azithromycin and 1L normal saline. Started on Levophed.   At time of my assessment, patient has finished one litre saline and recevie ABX. She does not look in distress and is awake but slow to respond. Daughter present at bedside, report since patient has had stroke in  she is slow in response and they do not note much decline in mental status. Patient was in her [facility] state of health yesterday and felt worse on day of arrival in ER  24 - admitted in ICU for septic shock 2/2 UTI/ PNA. CTX and Azithromycin were continued. Further fluids resuscitation was restricted and hemodynamically supported with levophed.     Overnight, levo needs has come down to 5 mcg from 15 mcg yesterday in ER  Afebrile but WBC went up from 10 to 15  She looks ill overall, but not worse from last evening  Making  decision-making and personally managed or directed the management of the following life and organ supporting interventions that required my frequent assessment to treat or prevent imminent deterioration.    Khalida Del Valle MD   [facility]  [phone]  2024      Review of systems:     Review of Systems   Unable to obtain     Objective:     Vital Signs:  /74   Pulse (!) 115   Temp 98.1 °F (36.7 °C) (Axillary)   Resp (!) 33   Ht 1.524 m (5')   Wt 73.9 kg (163 lb)   SpO2 99%   BMI 31.83 kg/m²      Temp (24hrs), Av.5 °F (36.4 °C), Min:96.5 °F (35.8 °C), Max:98.1 °F (36.7 °C)           Intake/Output:     Intake/Output Summary (Last 24 hours) at 2024 0938  Last data filed at 2024 0700  Gross per 24 hour   Intake 351.28 ml   Output --   Net 351.28 ml       Physical Exam  Constitutional:       Appearance: She is ill-appearing and toxic-appearing.   HENT:      Head: Normocephalic.      Mouth/Throat:      Mouth: Mucous membranes are moist.   Cardiovascular:      Rate and Rhythm: Tachycardia present.   Pulmonary:      Effort: Pulmonary effort is normal. No respiratory distress.      Breath sounds: No wheezing.   Musculoskeletal:      Right lower leg: No edema.      Left lower leg: No edema.   Neurological:      Mental Status: She is disoriented.         Past Medical History:      has a past medical history of Arthritis, C1 cervical fracture ([facility]), Cancer (HCC), DJD (degenerative joint disease) of knee, Hyperlipidemia, Hypertension complicating diabetes ([facility]CC), Ill-defined condition, Pressure ulcer of left buttock, stage 3 ([facility]), Stroke ([facility]), Trigeminal neuralgia, and Type 2 diabetes mellitus with complication, without long-term current use of insulin ([facility]).    Past Surgical History:      has a past surgical history that includes heent; gyn (); other surgical history; and IR GUIDED IVC FILTER PLACEMENT.    Home Medications:     Prior to Admission medications    Medication Sig Start

## 2024-02-14 NOTE — PROGRESS NOTES
Critical care interdisciplinary rounds today.  Following members present: Case Management, , Clinical Lead, Diabetes Team, Nursing, Nutrition, Pharmacy, and Physician. Family invited to participate.  Plan of care discussed.  See clinical pathway for plan of care and interventions and desired outcomes.

## 2024-02-15 NOTE — PROGRESS NOTES
attended Interdisciplinary Rounds in the CCU. The team discussed the patient's care and medical status.    Rev. PARISH Lopez  [facility]   Paging Service [phone])

## 2024-02-15 NOTE — PROGRESS NOTES
ICU Progress Note        Subjective:   02/15 - lying on bed.       Vital Signs:    BP (!) 89/51   Pulse (!) 101   Temp 97.7 °F (36.5 °C) (Axillary)   Resp 15   Ht 1.524 m (5')   Wt 73.9 kg (162 lb 14.7 oz)   SpO2 94%   BMI 31.82 kg/m²             Temp (24hrs), Av.1 °F (36.7 °C), Min:97.7 °F (36.5 °C), Max:98.2 °F (36.8 °C)       Intake/Output:   Last shift:      No intake/output data recorded.  Last 3 shifts:  1901 - 02/15 0700  In: 199.6 [I.V.:199.6]  Out: -     Intake/Output Summary (Last 24 hours) at 2/15/2024 0942  Last data filed at 2024 2200  Gross per 24 hour   Intake 54.87 ml   Output --   Net 54.87 ml         Physical Exam:    General: Not in acute distress  HEENT:  Anicteric sclerae; pink palpebral conjunctivae; mucosa moist  Resp:  Bilateral air entry +, no crackles or wheeze  CV:  S1, S2 present  GI:  Abdomen soft, non-tender; (+) active bowel sounds  Extremities:  +2 pulses on all extremities; no edema/ cyanosis/ clubbing noted  Skin:  Warm; no rashes/ lesions noted  Neurologic:  Sleepy but wakes up to command.     DATA:     Current Facility-Administered Medications   Medication Dose Route Frequency    cefepime (MAXIPIME) 1,000 mg in sodium chloride 0.9 % 50 mL IVPB (mini-bag)  1,000 mg IntraVENous Q12H    alcohol 62% (NOZIN) nasal  1 ampule  1 ampule Nasal Q12H    OXcarbazepine (TRILEPTAL) tablet 150 mg  150 mg Oral BID    aspirin EC tablet 81 mg  81 mg Oral Daily    miconazole (MICOTIN) 2 % cream   Topical BID    sodium chloride flush 0.9 % injection 5-40 mL  5-40 mL IntraVENous 2 times per day    sodium chloride flush 0.9 % injection 5-40 mL  5-40 mL IntraVENous PRN    0.9 % sodium chloride infusion   IntraVENous PRN    heparin (porcine) injection 5,000 Units  5,000 Units SubCUTAneous 3 times per day    ondansetron (ZOFRAN-ODT) disintegrating tablet 4 mg  4 mg Oral Q8H PRN    Or    ondansetron (ZOFRAN) injection 4 mg  4 mg IntraVENous Q6H PRN    polyethylene glycol  Time: 02/15/24  4:18 AM   Result Value Ref Range    WBC 9.7 3.6 - 11.0 K/uL    RBC 2.74 (L) 3.80 - 5.20 M/uL    Hemoglobin 8.4 (L) 11.5 - 16.0 g/dL    Hematocrit 28.8 (L) 35.0 - 47.0 %    .1 (H) 80.0 - 99.0 FL    MCH 30.7 26.0 - 34.0 PG    MCHC 29.2 (L) 30.0 - 36.5 g/dL    RDW 17.4 (H) 11.5 - 14.5 %    Platelets 169 150 - 400 K/uL    MPV 11.5 8.9 - 12.9 FL    Nucleated RBCs 0.6 (H) 0  WBC    nRBC 0.06 (H) 0.00 - 0.01 K/uL    Neutrophils % 70 32 - 75 %    Lymphocytes % 20 12 - 49 %    Monocytes % 6 5 - 13 %    Eosinophils % 2 0 - 7 %    Basophils % 1 0 - 1 %    Immature Granulocytes 2 (H) 0.0 - 0.5 %    Neutrophils Absolute 6.8 1.8 - 8.0 K/UL    Lymphocytes Absolute 2.0 0.8 - 3.5 K/UL    Monocytes Absolute 0.6 0.0 - 1.0 K/UL    Eosinophils Absolute 0.2 0.0 - 0.4 K/UL    Basophils Absolute 0.1 0.0 - 0.1 K/UL    Absolute Immature Granulocyte 0.2 (H) 0.00 - 0.04 K/UL    Differential Type AUTOMATED     Magnesium    Collection Time: 02/15/24  4:18 AM   Result Value Ref Range    Magnesium 2.4 1.6 - 2.4 mg/dL   Phosphorus    Collection Time: 02/15/24  4:18 AM   Result Value Ref Range    Phosphorus 5.0 (H) 2.6 - 4.7 MG/DL       Imaging:    CT ABDOMEN PELVIS WO CONTRAST Additional Contrast? None   Final Result      1. No identified cause for sepsis.   2. Small pleural effusions.   3. Probable cholelithiasis.   4. Chronic renal changes with left hydronephrosis and large left extra renal   pelvis.   5. Incidentals as above including colonic diverticulosis.      XR CHEST PORTABLE   Final Result      Diffuse hazy reticulonodular opacities, can be seen with edema and/or atypical   infection. No consolidative pneumonia.           CT Head W/O Contrast   Final Result   No acute process or change compared to the prior exam.                Assessment and Plan:    Patient with history of HTN, HF has had 2 hospitalizations (November 2023 for Covid/ HF and December for RLE wound cellulitis) and has deteriorating functional

## 2024-02-15 NOTE — PROGRESS NOTES
1900: Report received at bedside from SHA Villarreal in SBAR format with all questions and concerns addressed.    2000: Pt assessment completed with findings documented, pt repositioned. Pt currently A&O x4, Levo @ 4mcg/min, on 2 LPM NC.    2200: Rounded on pt resting quietly in bed with eyes closed, pt repositioned.     0000: Reassessed pt, findings documented, pt repositioned.    0200: Rounded on pt resting quietly in bed with eyes closed, pt repositioned. Increased O2 for low SpO2, (see Flowsheet).    0400: Reassessed pt, resting quietly in bed with eyes closed, pt repositioned.    0545: Bathed pt, linen and gown change. Purewick changed.    0600: Rounded on pt resting quietly in bed with eyes closed.    0700: Report given at bedside to SHA Ruelas in SBAR format with all questions and concerns addressed.

## 2024-02-15 NOTE — PROGRESS NOTES
0700- Bedside, Verbal, and Written shift change report given to Jessenia RN (oncoming nurse) by Osorio DALTON (offgoing nurse). Report included the following information SBAR, Kardex, ED Summary, Intake/Output, MAR, and Cardiac Rhythm Afib .     0800- Shift assessment completed, see flowsheets.     1120- Interdisciplinary team rounds were held 2/15/2024 with the following team members: Physician, Nursing, CCU Charge, Pharmacy, Dietitian, Case Management, .    Plan of care discussed. See clinical pathway and/or care plan for interventions and desired outcomes.    Goals of the Day:  LR bolus   SLP eval      1200- Reassessment completed, see flowsheets.     1340- SLP at bedside.    1600- Reassessment completed, see flowsheets.     1900- Bedside, Verbal, and Written shift change report given to Sumaya RN & Julita RN (oncoming nurse) by Jessenia RN (offgoing nurse). Report included the following information SBAR, Kardex, ED Summary, Intake/Output, MAR, and Cardiac Rhythm Afib .     Jessenia Villar, RN

## 2024-02-15 NOTE — PROGRESS NOTES
0700 - received report from SHA Ac.     0800 - patient assessment completed. See flow sheets for data. Patient is alert and oriented. At baseline has aphasia from a past CVA and is weak on the left side. Currently on room air. Lung sounds are coarse. A-fib on the monitor. Pulses are palpable with generalized edema. Bowel sounds are active. See LDA's for wound assessments.     1000 - Interdisciplinary rounds held plan to give the patient a 500 mL bolus and then draw a lactic acid afterwards.     1200 - reassessment completed. No changes noted. LEVO off temporarily only to later require it.     1600 - reassessment completed. No changes noted. Labs drawn and sent off.     1900 - report given to SHA Ac.

## 2024-02-15 NOTE — PROGRESS NOTES
Interdisciplinary team rounds were held 2/15/2024 with the following team members: Physician, Nursing, Dietitian, Pharmacist, , , and the CCU Charge RN.    Plan of care discussed. See clinical pathway and/or care plan for interventions and desired outcomes.    Goals of the Day: LR 500mL bolus, then maintenance @ 100m, and S.T. consult.

## 2024-02-15 NOTE — PLAN OF CARE
Status:  Neurologic State: Alert  Orientation Level: Oriented to person, Oriented to place, and Disoriented to time  Cognition: Decreased attention/concentration    Dysphagia:  Oral Assessment:  Oral Motor   Dentition: Natural  Oral Hygiene: Xerostomia  P.O. Trials:  PO Trials  Assessment Method(s): Observation  Patient Position: up in bed  Vocal Quality: No Impairment (at baseline speech/voice per daughter, dysarthric)  Consistency Presented: Pureed;Thin  How Presented: SLP-fed/Presented;Spoon;Straw  Bolus Acceptance: No impairment  Bolus Formation/Control: Impaired  Type of Impairment: Delayed  Propulsion: Delayed (# of seconds)  Oral Residue: None  Aspiration Signs/Symptoms: None            Motor Speech:         Language Comprehension and Expression:                   Neuro-Linguistics:                      Voice:       Respiratory Status/Airway:  Nasal cannula, 4LPM                           Functional Oral Intake Scale (FOIS): 5--Total oral diet with multiple consistencies but requiring special preparation or compensations    After treatment:   Patient left in no apparent distress in bed, Call bell left within reach, Nursing notified, and Caregiver present    COMMUNICATION/EDUCATION:   Patient was educated regarding her deficit(s) of WFL swallow as this relates to her diagnosis.  She demonstrated Fair understanding as evidenced by Limited understanding/response due to cognitive status.    The patient's plan of care including recommendations, planned interventions, and recommended diet changes were discussed with: Registered nurse    Patient/family have participated as able in goal setting and plan of care and Patient/family agree to work toward stated goals and plan of care    Thank you,  Maria Del Carmen Shea, SLP  Minutes: 20    Problem: SLP Adult - Impaired Swallowing  Goal: By Discharge: Advance to least restrictive diet without signs or symptoms of aspiration for planned discharge setting.  See evaluation for  individualized goals.  Description: Speech pathology goals  Initiated 2/15/2024  1. Patient will tolerate easy to chew/thin liquid diet with no overt s/s aspiration or adverse effects within 7 days    Outcome: Progressing

## 2024-02-16 NOTE — CONSULTS
Hospitalist Progress Note    NAME:   Cathy Jones   : 1927   MRN: [id number]     Date/Time: 2024 2:32 PM  Patient PCP: RADHA Mcnamara IV, MD    Estimated discharge date:   Barriers:       Assessment / Plan:  S/p septic shock  Severe sepsis urosepsis versus pneumonia  Follows cultures  was ceftriaxone and azithromycin  Urine culture growing Pseudomonas  Ceftriaxone switched to cefepime  Off pressor, MAP stable    CHF with reduced ejection fraction  EF 35%  No signs of fluid overload  Patient on aspirin patient at home on metoprolol  Will add metoprolol as needed with parameter for low systolic blood pressure    DAPHNIE  Creatinine trending down  Possible ATN  Monitor BMP daily        Right leg wound  Chronic  Wound care consulted    PTA stroke  PTA Aphasia  PTA right leg wound      Medical Decision Making:   I personally reviewed labs:yes  I personally reviewed imaging:yes  I personally reviewed EKG:yes  Toxic drug monitoring: yes  Discussed case with: IDR        Code Status: DNR  DVT Prophylaxis: Heparin  GI Prophylaxis:    Subjective:     Chief Complaint / Reason for Physician Visit  \"Seen and examined    Patient downgraded from ICU   Admitted for septic shock  Off pressors  Urosepsis  \".  Discussed with RN events overnight.       Objective:     VITALS:   Last 24hrs VS reviewed since prior progress note. Most recent are:  Patient Vitals for the past 24 hrs:   BP Temp Temp src Pulse Resp SpO2   24 1300 (!) 84/75 -- -- (!) 126 26 94 %   24 1200 (!) 98/54 98.5 °F (36.9 °C) Axillary (!) 113 25 --   24 1145 105/64 -- -- (!) 117 25 --   24 1004 101/69 -- -- (!) 117 23 92 %   24 0900 113/63 -- -- (!) 111 24 93 %   24 0800 111/67 98.9 °F (37.2 °C) Axillary (!) 104 22 93 %   24 0700 -- -- -- 99 14 95 %   24 0600 (!) 108/52 -- -- (!) 114 23 92 %   24 0500 (!) 77/49 -- -- 96 18 94 %   24 0420 -- -- -- 98 -- 100 %   24 (!) 93/55 --

## 2024-02-16 NOTE — PROGRESS NOTES
ICU Progress Note        Subjective:   02/15 - lying on bed.    - No complaints.       Vital Signs:    /63   Pulse (!) 111   Temp 98.9 °F (37.2 °C) (Axillary)   Resp 24   Ht 1.524 m (5')   Wt 73.9 kg (162 lb 14.7 oz)   SpO2 93%   BMI 31.82 kg/m²             Temp (24hrs), Av °F (36.7 °C), Min:97.3 °F (36.3 °C), Max:98.9 °F (37.2 °C)       Intake/Output:   Last shift:       07 -  190  In: 216.8 [I.V.:216.8]  Out: 250 [Urine:250]  Last 3 shifts: 1901 -  0700  In: 2236.4 [P.O.:220; I.V.:1423]  Out: 550 [Urine:550]    Intake/Output Summary (Last 24 hours) at 2024 1006  Last data filed at 2024 0912  Gross per 24 hour   Intake 2398.3 ml   Output 800 ml   Net 1598.3 ml         Physical Exam:    General: Not in acute distress  HEENT:  Anicteric sclerae; pink palpebral conjunctivae; mucosa moist  Resp:  Bilateral air entry +, no crackles or wheeze  CV:  S1, S2 present  GI:  Abdomen soft, non-tender; (+) active bowel sounds  Extremities:  +2 pulses on all extremities; no edema/ cyanosis/ clubbing noted  Skin:  Warm; no rashes/ lesions noted  Neurologic:  Sleepy but wakes up to command.     DATA:     Current Facility-Administered Medications   Medication Dose Route Frequency    cefepime (MAXIPIME) 1,000 mg in sodium chloride 0.9 % 50 mL IVPB (mini-bag)  1,000 mg IntraVENous Q12H    lactated ringers IV soln infusion   IntraVENous Continuous    alcohol 62% (NOZIN) nasal  1 ampule  1 ampule Nasal Q12H    OXcarbazepine (TRILEPTAL) tablet 150 mg  150 mg Oral BID    aspirin EC tablet 81 mg  81 mg Oral Daily    miconazole (MICOTIN) 2 % cream   Topical BID    sodium chloride flush 0.9 % injection 5-40 mL  5-40 mL IntraVENous 2 times per day    sodium chloride flush 0.9 % injection 5-40 mL  5-40 mL IntraVENous PRN    0.9 % sodium chloride infusion   IntraVENous PRN    heparin (porcine) injection 5,000 Units  5,000 Units SubCUTAneous 3 times per day    ondansetron  Patient has baseline aphasia since stroke and has limited capacity to understand and answer questions at baseline.    DNR    02/16  - I spoke to Tory Moore, daughter on phone and updated. She is aware that we might be moving the patient out of ICU today.     [x]       High complexity decision making was performed in this patient at high risk for decompensation with multiple organ involvement     Min Anderson MD,LORENA, FCCM, FCCP, ATSF, FACP, DAABIP  Interventional Pulmonology/Critical Care Medicine  [facility]  [facility]

## 2024-02-16 NOTE — CONSULTS
Hospitalist Progress Note    NAME:   Cathy Jones   : 1927   MRN: [id number]     Date/Time: 2024 2:32 PM  Patient PCP: RADHA Mcnamara IV, MD    Estimated discharge date:   Barriers:       Assessment / Plan:  S/p septic shock  Severe sepsis urosepsis versus pneumonia  Follows cultures  was ceftriaxone and azithromycin  Urine culture growing Pseudomonas  Ceftriaxone switched to cefepime  Off pressor, MAP stable    CHF with reduced ejection fraction  EF 35%  No signs of fluid overload  Patient on aspirin patient at home on metoprolol  Will add metoprolol as needed with parameter for low systolic blood pressure    DAPHNIE  Creatinine trending down  Possible ATN  Monitor BMP daily        Right leg wound  Chronic  Wound care consulted    PTA stroke  PTA Aphasia  PTA right leg wound      Medical Decision Making:   I personally reviewed labs:yes  I personally reviewed imaging:yes  I personally reviewed EKG:yes  Toxic drug monitoring: yes  Discussed case with: IDR        Code Status: DNR  DVT Prophylaxis: Heparin  GI Prophylaxis:    Subjective:     Chief Complaint / Reason for Physician Visit  \"Seen and examined    Patient downgraded from ICU   Admitted for septic shock  Off pressors  Urosepsis  \".  Discussed with RN events overnight.       Objective:     VITALS:   Last 24hrs VS reviewed since prior progress note. Most recent are:  Patient Vitals for the past 24 hrs:   BP Temp Temp src Pulse Resp SpO2   24 1300 (!) 84/75 -- -- (!) 126 26 94 %   24 1200 (!) 98/54 98.5 °F (36.9 °C) Axillary (!) 113 25 --   24 1145 105/64 -- -- (!) 117 25 --   24 1004 101/69 -- -- (!) 117 23 92 %   24 0900 113/63 -- -- (!) 111 24 93 %   24 0800 111/67 98.9 °F (37.2 °C) Axillary (!) 104 22 93 %   24 0700 -- -- -- 99 14 95 %   24 0600 (!) 108/52 -- -- (!) 114 23 92 %   24 0500 (!) 77/49 -- -- 96 18 94 %   24 0420 -- -- -- 98 -- 100 %   24 (!) 93/55 --

## 2024-02-16 NOTE — PROGRESS NOTES
Prior to transfer patient sacrum assessed. (Red but blanchable even in center)  Photo obtained.  Dual eyes Adamaris Kessler and Jessenia Villar

## 2024-02-16 NOTE — PLAN OF CARE
Speech LAnguage Pathology TREATMENT    Patient: Cathy Jones (89 y.o. female)  Date: 2/16/2024  Primary Diagnosis: Acute pulmonary edema (HCC) [J81.0]  UTI (urinary tract infection), bacterial [N39.0, A49.9]  Septic shock (HCC) [A41.9, R65.21]  Acute on chronic congestive heart failure, unspecified heart failure type (HCC) [I50.9]  Sepsis with acute renal failure and septic shock, due to unspecified organism, unspecified acute renal failure type (HCC) [A41.9, R65.21, N17.9]       Precautions: Aspiration precautions                    ASSESSMENT :  Patient continues to be followed by SLP. Patient continues to present with suspected baseline oropharyngeal swallow function given significantly advanced age. Discussed with RN, who reports patient mainly consuming naturally pureed options (i.e. oatmeal, applesauce) from breakfast tray this morning. Patient received on 4.5 LPM via nasal cannula on this date. Patient agreeable to limited PO trials on this date. Patient observed with weak cough x1, suspect related to successive sips of thin liquid, which was not reproducible when patient cued for single sips. No overt difficulty observed with pureed trials. Patient interested in soup for lunch on this date. One time diet order placed for soup. SLP will continue to follow.    Patient will benefit from skilled intervention to address the above impairments.     PLAN :  Recommendations and Planned Interventions:  Diet: Easy to chew and thin liquids  Suspect patient will self-limit to natural purees and soups, and this diet will allow the most options for soup  Oral nutrition supplements per daughter request; defer to RD  Meds crushed in puree       Acute SLP Services: Yes, SLP will continue to follow per plan of care.    Discharge Recommendations: No, additional SLP treatment not indicated at discharge     SUBJECTIVE:   Patient stated, “That's enough.”    OBJECTIVE:     Past Medical History:   Diagnosis Date    Arthritis

## 2024-02-16 NOTE — PROGRESS NOTES
1900h- Bedside and Verbal shift change report given by Yamilka DALTON (offgoing nurse). Report included the following information Nurse Handoff Report, Adult Overview, MAR, Recent Results, Cardiac Rhythm Afib, Alarm Parameters, Quality Measures, and Neuro Assessment.   - awake, responsive, on basal cannula 4lpm.  2000h- initial assessment done; see flowsheet.   2030h- changed gown, purewick done.  2100h- due all meds given.  0000. Reassessment done; see flowsheet.  0200h- blood drawn then sent to lab; for BMP, MG, PH, CBC.  0300h- removed IV cannula on left AC; inserted gauge 22 on left upper arm via ultrasound guided by Sumaya DALTON.   0400h- removed IV cannula on left upper arm; infiltrated.  0430h- held IVF still for IV reinsertion.  0600h- labs seen by ROSY Black.  0630h- succesfully inserted IV cannula on left arm by John DALTON via usd guided; resumed IVF.  0700h-Bedside and Verbal shift change report given to Jenny DALTON (oncoming nurse). Report included the following information Nurse Handoff Report, ED Encounter Summary, Intake/Output, MAR, Recent Results, Cardiac Rhythm Afib, Alarm Parameters, Quality Measures, and Neuro Assessment.

## 2024-02-16 NOTE — PROGRESS NOTES
0700: Bedside shift report received from SHA Shell.   0800: Shift assessment complete. Pt is A&Ox1, oriented to self only. Pt with NC @ 4.5L at this time. Afib on monitor with rate 110s-120s. MD aware. VSS. Afebrile. PW in place, yellow/hazy urine noted. No c/o pain or discomfort at this time. See flowsheets for assessment details and eMAR for medication details.   1200: Reassessment complete - no acute changes.   1335: Pt PIV infiltrated; LR maintenance fluids stopped. PICC team called. Pt given PRN PO zofran for nausea. VSS.   1430: new PIV inserted, LR restarted.   1600: Reassessment complete - no acute changes.   1650: Transfer report given to PCU RN.   1710: Pt transferred to PCU 2317. VSS. RN, tech, and nursing student at bedside upon transfer.

## 2024-02-16 NOTE — PROGRESS NOTES
1650- report received from CCU nurse.    1710-patient transferred to PCU.     1715- Dual skin assessed with SHA Hassan, Ambreen, RN, and student. Wound care of R Leg done by SHA hassan. Wound care consulted.

## 2024-02-17 NOTE — CONSULTS
Last 24hrs VS reviewed since prior progress note. Most recent are:  Patient Vitals for the past 24 hrs:   BP Temp Temp src Pulse Resp SpO2 Weight   02/17/24 1553 (!) 94/55 -- -- (!) 140 -- -- --   02/17/24 1410 (!) 112/97 -- -- (!) 142 26 -- --   02/17/24 1405 (!) 115/58 -- -- -- -- -- --   02/17/24 1355 (!) 186/147 -- -- (!) 119 27 93 % --   02/17/24 1347 (!) 220/160 -- -- (!) 121 30 -- --   02/17/24 1335 (!) 222/191 -- -- (!) 130 25 -- --   02/17/24 1330 (!) 240/212 -- -- (!) 131 (!) 31 93 % --   02/17/24 1325 (!) 207/155 -- -- (!) 127 23 93 % --   02/17/24 1056 (!) 94/59 97.8 °F (36.6 °C) Oral (!) 128 27 92 % --   02/17/24 0950 108/61 -- -- (!) 145 25 90 % --   02/17/24 0943 -- -- -- (!) 139 26 -- --   02/17/24 0942 -- -- -- (!) 151 22 -- --   02/17/24 0941 -- -- -- (!) 122 (!) 32 -- --   02/17/24 0940 -- -- -- (!) 154 27 -- --   02/17/24 0939 -- -- -- (!) 134 24 -- --   02/17/24 0938 -- -- -- (!) 138 19 (!) 89 % --   02/17/24 0725 (!) 103/48 98 °F (36.7 °C) Axillary (!) 121 20 93 % --   02/17/24 0600 -- -- -- -- -- -- 76 kg (167 lb 9.6 oz)   02/17/24 0445 -- -- -- (!) 113 21 -- --   02/17/24 0430 -- -- -- (!) 113 26 -- --   02/17/24 0415 -- -- -- (!) 113 23 -- --   02/17/24 0400 (!) 124/104 97.9 °F (36.6 °C) -- (!) 130 22 97 % --   02/17/24 0345 -- -- -- (!) 101 12 -- --   02/17/24 0330 -- -- -- (!) 102 16 -- --   02/17/24 0315 -- -- -- 93 16 -- --   02/17/24 0300 -- -- -- 97 18 -- --   02/17/24 0245 -- -- -- 97 17 -- --   02/17/24 0230 -- -- -- (!) 109 17 -- --   02/17/24 0215 -- -- -- (!) 109 18 -- --   02/17/24 0200 -- -- -- (!) 118 21 -- --   02/17/24 0145 -- -- -- 98 13 -- --   02/17/24 0130 -- -- -- (!) 111 13 -- --   02/17/24 0115 -- -- -- (!) 106 17 -- --   02/17/24 0100 -- -- -- (!) 117 24 -- --   02/17/24 0045 -- -- -- 100 19 -- --   02/17/24 0030 -- -- -- (!) 101 16 -- --   02/17/24 0015 -- -- -- 95 18 -- --   02/17/24 0000 -- -- -- (!) 110 20 -- --   02/16/24 2345 -- -- -- (!) 101 14 -- --    02/16/24 2330 -- -- -- (!) 101 18 -- --   02/16/24 2315 -- -- -- 100 19 -- --   02/16/24 2304 116/73 98.4 °F (36.9 °C) Axillary -- -- 96 % --   02/16/24 2300 116/73 -- -- (!) 116 20 -- --   02/16/24 2245 -- -- -- (!) 108 20 -- --   02/16/24 2230 -- -- -- (!) 110 21 -- --   02/16/24 2215 -- -- -- (!) 105 19 -- --   02/16/24 2200 -- -- -- (!) 103 21 -- --   02/16/24 2145 -- -- -- (!) 112 22 -- --   02/16/24 1945 127/80 97.6 °F (36.4 °C) Tympanic (!) 124 (!) 31 -- --   02/16/24 1930 -- -- -- (!) 117 24 -- --   02/16/24 1915 -- -- -- (!) 124 21 -- --   02/16/24 1900 -- -- -- (!) 109 21 -- --   02/16/24 1745 101/61 98.5 °F (36.9 °C) Axillary (!) 112 24 99 % --   02/16/24 1655 108/82 -- -- (!) 117 26 92 % --           Intake/Output Summary (Last 24 hours) at 2/17/2024 1626  Last data filed at 2/17/2024 1336  Gross per 24 hour   Intake 2361.89 ml   Output 855 ml   Net 1506.89 ml          I had a face to face encounter and independently examined this patient on 2/17/2024, as outlined below:  PHYSICAL EXAM:  General: Alert, cooperative  EENT:  EOMI. Anicteric sclerae.  Resp:  CTA bilaterally, no wheezing or rales.  No accessory muscle use  CV:  Tachycardic, irregular rhythm,  No edema  GI:  Soft, Non distended, Non tender.  +Bowel sounds  Neurologic:  AAOx1 (her baseline), aphasic  Psych:   Not anxious nor agitated  Skin:  No rashes.  No jaundice    Reviewed most current lab test results and cultures  YES  Reviewed most current radiology test results   YES  Review and summation of old records today    NO  Reviewed patient's current orders and MAR    YES  PMH/SH reviewed - no change compared to H&P    Procedures: see electronic medical records for all procedures/Xrays and details which were not copied into this note but were reviewed prior to creation of Plan.      LABS:  I reviewed today's most current labs and imaging studies.  Pertinent labs include:  Recent Labs     02/15/24  0418 02/16/24  0250 02/17/24  1531   WBC 9.7

## 2024-02-17 NOTE — PROGRESS NOTES
1650- Received report from SHA Alvarado  via telephone from CCU.     1745- Did  skin assessment  with SHA Hassan for right shin wound, cleansed wound with normal saline, redressed wound with xeroform dressing, and foam dressing. Wound care consult placed.     End of Shift Note    Bedside shift change report given to SHA Arcos (oncoming nurse) by Ambreen Thomas RN (offgoing nurse).  Report included the following information SBAR, MAR, Recent Results, and Cardiac Rhythm A Fib    Shift worked:  9057-5479     Shift summary and any significant changes:     Patient currently on 5 L N/C, Ordered venelex and placed wound care consult, redressed dressing on right shin.      Concerns for physician to address:       Zone phone for oncoming shift:   [phone]       Activity:     Number times ambulated in hallways past shift: 0  Number of times OOB to chair past shift: 0    Cardiac:   Cardiac Monitoring: Yes           Access:  Current line(s): PIV     Genitourinary:   Urinary status: voiding and external catheter    Respiratory:      Chronic home O2 use?: NO  Incentive spirometer at bedside: NO       GI:     Current diet:  ADULT DIET; Easy to Chew  DIET ONE TIME MESSAGE;  Passing flatus: YES  Tolerating current diet: YES       Pain Management:   Patient states pain is manageable on current regimen: YES    Skin:     Interventions: float heels, internal/external urinary devices, and nutritional support    Patient Safety:  Fall Score:    Interventions: bed/chair alarm, gripper socks, pt to call before getting OOB, and stay with me (per policy)       Length of Stay:  Expected LOS: 16  Actual LOS: 3      Ambreen Thomas RN

## 2024-02-17 NOTE — PLAN OF CARE
Problem: Safety - Adult  Goal: Absence of physical injury  Description: Absence of physical injury  Outcome: Progressing     Problem: Discharge Planning  Goal: Discharge to home or other facility with appropriate resources  Outcome: Progressing  Flowsheets (Taken 2/16/2024 1745 by Hannah Jenkins)  Discharge to home or other facility with appropriate resources: Identify barriers to discharge with patient and caregiver     Problem: Chronic Conditions and Co-morbidities  Goal: Patient's chronic conditions and co-morbidity symptoms are monitored and maintained or improved  Outcome: Progressing  Flowsheets (Taken 2/16/2024 1745 by Hannah Jenkins)  Care Plan - Patient's Chronic Conditions and Co-Morbidity Symptoms are Monitored and Maintained or Improved:   Monitor and assess patient's chronic conditions and comorbid symptoms for stability, deterioration, or improvement   Collaborate with multidisciplinary team to address chronic and comorbid conditions and prevent exacerbation or deterioration   Update acute care plan with appropriate goals if chronic or comorbid symptoms are exacerbated and prevent overall improvement and discharge

## 2024-02-17 NOTE — PROGRESS NOTES
Attempted to call cardiology consult to [facility]. Answering service placed on hold for excess of 15 minutes.

## 2024-02-17 NOTE — PROGRESS NOTES
4 Eyes Skin Assessment     NAME:  Cathy Jones  YOB: 1927  MEDICAL RECORD NUMBER:  [id number]    The patient is being assessed for  Transfer to New Unit    I agree that at least one RN has performed a thorough Head to Toe Skin Assessment on the patient. ALL assessment sites listed below have been assessed.      Areas assessed by both nurses:    Head, Face, Ears, Shoulders, Back, Chest, Arms, Elbows, Hands, Sacrum. Buttock, Coccyx, Ischium, and Legs. Feet and Heels        Does the Patient have a Wound? Yes wound(s) were present on assessment. LDA wound assessment was Initiated and completed by RN       Jaciel Prevention initiated by RN: Yes  Wound Care Orders initiated by RN: Yes    Pressure Injury (Stage 3,4, Unstageable, DTI, NWPT, and Complex wounds) if present, place Wound referral order by RN under : Yes    New Ostomies, if present place, Ostomy referral order under : No     Nurse 1 eSignature: Electronically signed by Ambreen Thomas RN on 2/16/24 at 8:53 PM EST    **SHARE this note so that the co-signing nurse can place an eSignature**    Nurse 2 eSignature: {Esignature:[id number]}

## 2024-02-17 NOTE — PLAN OF CARE
Problem: Physical Therapy - Adult  Goal: By Discharge: Performs mobility at highest level of function for planned discharge setting.  See evaluation for individualized goals.  Description: FUNCTIONAL STATUS PRIOR TO ADMISSION: Pt presents as a limited/poor historian at time of PT Evaluation. Upon belief, Pt resides at [facility] and is ambulatory with RW at baseline. Unknown level of assist need for ADLs/IADLs and OOB/GT activity.    HOME SUPPORT PRIOR TO ADMISSION: [facility] staff     Physical Therapy Goals  Initiated 2/17/2024  1.  Patient will move from supine to sit and sit to supine in bed with contact guard assist within 7 day(s).    2.  Patient will perform sit to stand with contact guard assist within 7 day(s).  3.  Patient will transfer from bed to chair and chair to bed with contact guard assist using the least restrictive device within 7 day(s).  4.  Patient will ambulate with contact guard assist for 50 feet with the least restrictive device within 7 day(s).     Outcome: Progressing  PHYSICAL THERAPY EVALUATION    Patient: Cathy Jones (89 y.o. female)  Date: 2/17/2024  Primary Diagnosis: Acute pulmonary edema (HCC) [J81.0]  UTI (urinary tract infection), bacterial [N39.0, A49.9]  Septic shock (HCC) [A41.9, R65.21]  Acute on chronic congestive heart failure, unspecified heart failure type (HCC) [I50.9]  Sepsis with acute renal failure and septic shock, due to unspecified organism, unspecified acute renal failure type (HCC) [A41.9, R65.21, N17.9]       Precautions: Restrictions/Precautions:  (falls, aspiration risk, elevated HR with limited activity)                      ASSESSMENT :   DEFICITS/IMPAIRMENTS:   Pt tolerated PT services well. Pt presents as AO to name. Identification also confirmed via wrist ID. Pt presents as a poor historian. Per medical chart, Pt is a [facility] resident and ambulatory at baseline with RW. Unknown level of assist required prior to admission. With increased time/effort,  Motion:  AROM: Within functional limits  PROM: Within functional limits    Functional Mobility:  Bed Mobility:     Bed Mobility Training  Bed Mobility Training: Yes  Overall Level of Assistance: Moderate assistance  Interventions: Manual cues;Demonstration;Safety awareness training;Tactile cues;Verbal cues  Rolling: Moderate assistance  Supine to Sit: Moderate assistance  Sit to Supine: Moderate assistance  Scooting: Minimum assistance  Transfers:     Transfer Training  Transfer Training: No (not attempted per medical status)  Balance:               Balance  Sitting: Impaired  Sitting - Static:  (fair/fair plus)  Sitting - Dynamic: Fair (occasional)  Standing:  (unable to assess per elevated HR at EOB/limited activity)  Ambulation/Gait Training:                                                                                                                                                                                                                                                                                  [facility] AM-PAC®      Basic Mobility Inpatient Short Form (6-Clicks) Version 2  How much HELP from another person do you currently need... (If the patient hasn't done an activity recently, how much help from another person do you think they would need if they tried?) Total A Lot A Little None   1.  Turning from your back to your side while in a flat bed without using bedrails? []  1 [x]  2 []  3  []  4   2.  Moving from lying on your back to sitting on the side of a flat bed without using bedrails? []  1 [x]  2 []  3  []  4   3.  Moving to and from a bed to a chair (including a wheelchair)? []  1 [x]  2 []  3  []  4   4. Standing up from a chair using your arms (e.g. wheelchair or bedside chair)? []  1 [x]  2 []  3  []  4   5.  Walking in hospital room? [x]  1 []  2 []  3  []  4   6.  Climbing 3-5 steps with a railing? [x]  1 []  2 []  3  []  4     Raw Score: 10/24                            Cutoff

## 2024-02-17 NOTE — PROGRESS NOTES
Bedside shift change report given to SHA Shah (oncoming nurse) by SHA Cosby (offgoing nurse). Report included the following information Nurse Handoff Report.

## 2024-02-17 NOTE — PLAN OF CARE
Problem: Chronic Conditions and Co-morbidities  Goal: Patient's chronic conditions and co-morbidity symptoms are monitored and maintained or improved  2/17/2024 0016 by Josselyn Woods RN  Outcome: Progressing  2/16/2024 2046 by Ambreen Thomas RN  Outcome: Progressing  Flowsheets  Taken 2/16/2024 1945 by Josselyn Woods RN  Care Plan - Patient's Chronic Conditions and Co-Morbidity Symptoms are Monitored and Maintained or Improved:   Monitor and assess patient's chronic conditions and comorbid symptoms for stability, deterioration, or improvement   Collaborate with multidisciplinary team to address chronic and comorbid conditions and prevent exacerbation or deterioration   Update acute care plan with appropriate goals if chronic or comorbid symptoms are exacerbated and prevent overall improvement and discharge  Taken 2/16/2024 1745 by Hannah Jenkins  Care Plan - Patient's Chronic Conditions and Co-Morbidity Symptoms are Monitored and Maintained or Improved:   Monitor and assess patient's chronic conditions and comorbid symptoms for stability, deterioration, or improvement   Collaborate with multidisciplinary team to address chronic and comorbid conditions and prevent exacerbation or deterioration   Update acute care plan with appropriate goals if chronic or comorbid symptoms are exacerbated and prevent overall improvement and discharge     Problem: Discharge Planning  Goal: Discharge to home or other facility with appropriate resources  2/17/2024 0016 by Josselyn Woods RN  Outcome: Progressing  2/16/2024 2046 by Ambreen Thomas RN  Outcome: Progressing  Flowsheets  Taken 2/16/2024 1945 by Josselyn Woods RN  Discharge to home or other facility with appropriate resources:   Identify barriers to discharge with patient and caregiver   Arrange for needed discharge resources and transportation as appropriate   Identify discharge learning needs (meds, wound care, etc)   Arrange for interpreters to assist at

## 2024-02-17 NOTE — PROGRESS NOTES
Physical therapy services and functional mobility assessment attempted @9:15AM. Reporting DPT arrived to room as Pt was receiving RN Team support for breakfast. Pt then self feeding banana with focus on eating (vs mobilization) at this time. Deferral of further attempts in interest patient comfort and importance of food consumption. PT Team will try to provide skilled therapy services later as time permits and as appropriate to patient tolerance.  RN Team aware.     Deanna Mejia, PT, DPT

## 2024-02-17 NOTE — PROGRESS NOTES
Bedside shift change report given to Josselyn DALTON (oncoming nurse) by Ambreen DALTON (offgoing nurse). Report included the following information Nurse Handoff Report, Index, Intake/Output, MAR, Med Rec Status, Cardiac Rhythm  , Neuro Assessment, and Event Log.  '          End of Shift Note    Bedside shift change report given to Vinnie DALTON (oncoming nurse) by Josselyn Woods RN (offgoing nurse).  Report included the following information SBAR, Kardex, Intake/Output, MAR, Recent Results, Cardiac Rhythm  , and Alarm Parameters     Shift worked:  7pm-7am     Shift summary and any significant changes:     O2 therapy continues via NC @4L; Remains on R/Lactate @100cc/hr, care and observation continues     Concerns for physician to address:  xxx     Zone phone for oncoming shift:   xxx       Activity:     Number times ambulated in hallways past shift: 0  Number of times OOB to chair past shift: 0    Cardiac:   Cardiac Monitoring: Yes           Access:  Current line(s): PIV     Genitourinary:   Urinary status: external catheter    Respiratory:      Chronic home O2 use?: YES  Incentive spirometer at bedside: NO       GI:     Current diet:  ADULT DIET; Easy to Chew  DIET ONE TIME MESSAGE;  Passing flatus: YES  Tolerating current diet: YES       Pain Management:   Patient states pain is manageable on current regimen: YES    Skin:     Interventions: turn team, specialty bed, float heels, increase time out of bed, foam dressing, PT/OT consult, limit briefs, internal/external urinary devices, and nutritional support    Patient Safety:  Fall Score:    Interventions: bed/chair alarm, assistive device (walker, cane. etc), gripper socks, pt to call before getting OOB, and stay with me (per policy)       Length of Stay:  Expected LOS: 16  Actual LOS: 4      Josselyn Woods RN

## 2024-02-18 NOTE — PROGRESS NOTES
Nursing contacted Nocturnist/cross cover provider and notified patient on amiodarone gtt and now reported sinus marily. No other concerns reported. No further details provided aside from nurse responded back to me and tells me Dr Raymond basilio just arrived to eval pt at the bedside as was consulted on dayshift. Will defer to cardiology further RECs at this time. No acute distress reported. Nursing to notify Hospitalist for further/continued concerns. Will remain available overnight for further concerns if nursing/patient needs. Will defer further evaluation/management to the day shift primary care team.    Update:  Nurse reported pt sbp high 80s on amiodarone gtt with hr 50s, asymptomatic. Was on midodrine earlier but appears was d/c with hypertension. Will resume midodrine with parameters to hold sbp greater than 100, and will also give NS bolus x1. Nurse to hold the amiodarone gtt, continue to monitor, and will notify me if continues to be hypotensive or hr continues to be bradycardia. In sinus marily presently. No other concerns reported at this time. No acute distress reported.    Update:  Nurse reported amiodarone off for about an hour, hr now in the low 40s sinus marily, and bp sbp 76. Had nursing call and d/w Dr Raymond Basilio and wants amiodarone resumed, he is aware of HR, rhythm and bp present. I updated/ d/w Dr Goins, will give 250ml bolus prn, nurse was unable to receive bolus earlier as piv access was lost and had to have new piv inserted. Presently has piv access. As d/w nurse will resume the amiodarone gtt per cards RECs at this time, nurse will continue to monitor and notify for further changes. On bedside exam pt does have faint scattered bilateral lower lobe crackles, no increased work of breathing and is on 2-3lpm o2 via NC. Denies any chest pain or dyspnea, dizziness, or h/a, is intermittently sleeping. Upon recheck of bp sbp now mid 90s and map 69. No acute distress noted on exam. Pt denies

## 2024-02-18 NOTE — PROGRESS NOTES
1545- Bedside and Verbal shift change report given to Sumi RN (oncoming nurse) by Kizzy MORENO RN (offgoing nurse). Report included the following information Nurse Handoff Report, Index, Intake/Output, MAR, and Recent Results.     1600- Shift assessment completed. Patient's BP is soft and is on Duy gtt. Patient has one IV access which has blood return but is slightly leaking. Four nurses tried establishing IV access with multiple unsuccessful attempts. Dr. Anderson notified at the bedside. Patient's daughter refused to get central line or any more needle sticks to patients. Dr. Gaxiola at the bedside explained the family about the pros cons and about keeping the patient comfortable. Family agreed to it for tonight and willing to talk to palliative/ hospice tomorrow. Meanwhile will continue to keep patient comfortable.     1745- Stopped Duy and LR infusion as per Dr. Gaxiola's orders.     1900- Bedside and Verbal shift change report given to Miracle MANTILLA RN (oncoming nurse) by Sumi rn (offgoing nurse). Report included the following information Nurse Handoff Report, Intake/Output, MAR, and Recent Results.

## 2024-02-18 NOTE — PROGRESS NOTES
TRANSFER - OUT REPORT:    Verbal report given to SHA Durand on Cathy Jones  being transferred to CCU for change in patient condition (hypotension)       Report consisted of patient's Situation, Background, Assessment and   Recommendations(SBAR).     Information from the following report(s) Nurse Handoff Report was reviewed with the receiving nurse.           Lines:   Peripheral IV 02/18/24 Right;Anterior Forearm (Active)   Site Assessment Clean, dry & intact 02/18/24 0745   Line Status Blood return noted;Infusing 02/18/24 1033   Line Care Ports disinfected;Connections checked and tightened 02/18/24 0745   Phlebitis Assessment No symptoms 02/18/24 1033   Infiltration Assessment 0 02/18/24 1033   Alcohol Cap Used Yes 02/18/24 1033   Dressing Status Intact;Old drainage noted 02/18/24 1033   Dressing Type Transparent 02/18/24 1033        Opportunity for questions and clarification was provided.      Patient transported with:  Monitor, O2 @ 4lpm, Registered Nurse, and Tech

## 2024-02-18 NOTE — PROGRESS NOTES
Cardiology Progress Note      2/18/2024 11:42 AM    Admit Date: 2/13/2024          Subjective:  Events overnight noted. Appears very weak. No specific c/o. On phenylephrine gtt         BP (!) 83/45   Pulse 52   Temp 97 °F (36.1 °C) (Oral)   Resp 19   Ht 1.524 m (5')   Wt 76 kg (167 lb 9.6 oz)   SpO2 97%   BMI 32.73 kg/m²   02/16 1901 - 02/18 0700  In: 2062.2 [I.V.:1898.7]  Out: 805 [Urine:805]        Objective:      Physical Exam:  VS as above  Chest scattered crackles, poor inspir effort  Card RRR no gallop     Data Review:   Labs:    Hgb 10.0  WBC 11.4   BUN 34  Creat 0.85    Telemetry: Sr       Assessment:       1.  Paroxysmal atrial fibrillation, resolved on intravenous amiodarone.  2.  Acute-on-chronic systolic heart failure of unknown etiology.  Prior ejection fraction 35%-40% with pulmonary edema on chest x-ray.  3.  Recent urosepsis requiring an intensive care unit stay with vasopressors.   4.  Recurrent shock   5.  Anemia.  6.  Chronic kidney disease.  7.  Advanced age and frailty.  8.  Prior inferior vena cava filter placement.    Plan: Very poor prognosis, cont supportive Rx but favor moving towards palliative/ hospice

## 2024-02-18 NOTE — CONSULTS
(!) 85/46 -- -- 53 -- 93 %   02/18/24 0500 (!) 89/39 -- -- 54 -- 96 %   02/18/24 0402 (!) 84/52 98.8 °F (37.1 °C) Oral 51 -- 90 %   02/18/24 0342 (!) 85/46 -- -- -- -- --   02/18/24 0312 90/64 -- -- 56 -- 91 %   02/18/24 0300 (!) 78/41 97.8 °F (36.6 °C) Axillary (!) 49 18 95 %   02/18/24 0255 -- -- -- (!) 49 -- 94 %   02/18/24 0245 (!) 87/51 -- -- 51 -- 95 %   02/18/24 0130 (!) 87/48 -- -- 55 -- 95 %   02/17/24 2330 -- -- -- 57 14 100 %   02/17/24 2307 (!) 95/51 98.4 °F (36.9 °C) Oral 56 20 96 %   02/17/24 1945 (!) 103/57 98.3 °F (36.8 °C) Oral 62 20 92 %   02/17/24 1805 107/74 -- -- (!) 105 27 --   02/17/24 1735 (!) 90/57 -- -- (!) 103 29 --   02/17/24 1553 (!) 94/55 98 °F (36.7 °C) Oral (!) 140 20 96 %           Intake/Output Summary (Last 24 hours) at 2/18/2024 1526  Last data filed at 2/18/2024 1418  Gross per 24 hour   Intake 472.59 ml   Output 250 ml   Net 222.59 ml          I had a face to face encounter and independently examined this patient on 2/18/2024, as outlined below:  PHYSICAL EXAM:  General: Alert, cooperative  EENT:  EOMI. Anicteric sclerae.  Resp:  CTA bilaterally, no wheezing or rales.  No accessory muscle use  CV:  bradycardic,  No edema  GI:  Soft, Non distended, Non tender.  +Bowel sounds  Neurologic:  AAOx1 (her baseline), aphasic  Psych:   Not anxious nor agitated  Skin:  No rashes.  No jaundice    Reviewed most current lab test results and cultures  YES  Reviewed most current radiology test results   YES  Review and summation of old records today    NO  Reviewed patient's current orders and MAR    YES  PMH/SH reviewed - no change compared to H&P    Procedures: see electronic medical records for all procedures/Xrays and details which were not copied into this note but were reviewed prior to creation of Plan.      LABS:  I reviewed today's most current labs and imaging studies.  Pertinent labs include:  Recent Labs     02/16/24  0250 02/17/24  1531 02/18/24  0436   WBC 11.5* 9.3 11.4*   HGB  9.5* 9.7* 10.0*   HCT 32.2* 33.0* 35.1    216 89*       Recent Labs     02/16/24  0250 02/17/24  1531 02/18/24  0436    142 140   K 4.0 4.7 5.1    109* 107   CO2 24 27 16*   GLUCOSE 93 177* 117*   BUN 44* 31* 34*   CREATININE 1.46* 1.27* 1.85*   CALCIUM 8.3* 8.6 8.9   MG 2.3 2.1 2.3   PHOS 3.5 3.1 4.8*         Signed: David L Mendel, MD

## 2024-02-18 NOTE — CONSULTS
[facility]  CONSULTATION    Name:  STEFANI ELAM  MR#:  [id number]  :  1927  ACCOUNT #:  [id number]  DATE OF SERVICE:  2024    REQUESTING PHYSICIAN:  David Mendel, MD    REASON FOR CONSULTATION:  Evaluate AFib.    CHIEF COMPLAINT:  The patient voices no chief complaint.    HISTORY OF PRESENT ILLNESS:  The patient is a 89-year-old female with a history of hypertension, congestive heart failure and dyslipidemia.  She has a known EF of 35%-40%.  She was just admitted back in December with respiratory failure and congestive heart failure.  She has been in assisted living and was noted to have low blood pressure.  She was admitted  and treated in the ICU with apparent urosepsis.  Her lactic acid was mildly elevated and she was treated with antibiotics and transferred to the floor.  On the floor, she developed rapid atrial fibrillation earlier today and I was asked to see the patient for evaluation.  She has been somewhat hypotensive and is quite weak.  No prior history of atrial fibrillation that I can find.  She is currently back in sinus rhythm and is on IV amiodarone.  No specific complaints at this time, although the patient is quite weak and a very limited historian.    PAST MEDICAL HISTORY:  As noted above, DJD, trigeminal neuralgia, prior IVC filter placement, prior Gyn surgery of uncertain type in .  Also history of chronic kidney disease and anemia.    CURRENT MEDICATIONS:  1.  IV amiodarone.  2.  IV cefepime.  3.  Aspirin.  4.  Toprol XL.  5.  IV Lasix.  6.  Heparin 5000 units every 8 hours.    SOCIAL HISTORY:  The patient does not smoke and apparently has drank social alcohol in the past.    FAMILY HISTORY:  Per the chart, the patient's father had a heart attack.    REVIEW OF SYSTEMS:  Not obtainable given the patient's current condition.    PHYSICAL EXAMINATION:  GENERAL:  Exam reveals a very elderly, frail-appearing white female.  VITAL SIGNS:  Blood pressure  with IV amiodarone and IV diuretics as well as IV antibiotics.  Thanks for this consult.        HOUSTON WHITE MD      [id number]  D:  02/17/2024 19:34  T:  02/17/2024 23:57  JOB #:  [id number]  CC: David Mendel, MD

## 2024-02-18 NOTE — PROGRESS NOTES
Bedside shift change report given to SHA Shah (oncoming nurse) by SHA Durand (offgoing nurse). Report included the following information Nurse Handoff Report.     0705 Patient appears anxious and nauseas during initial assessment. IV infiltrated at assessment, amiodarone paused. Hypotension and bradycardia observed. Patient repositioned, BP cuff repositioned and B/P rechecked several times.    0715 Rapid response called for hypotension. CCU charge nurse, nursing supervisor and on call physician at bedside. IV access reestablished during rapid. Amiodarone held.    0734 Oral midodrine administered. New order for non titratable phenylephrine also in place.    0738 IV phenylephrine initiated. B/P cycled to every 15 minutes.    0845 Intesivist and hospitalist at bedside. New order for titratable phenylephrine for a goal MAP of 55 per intensivist. Lactated Ringers continuous infusion unheld and Intensive care transfer placed at this time.    0944 Phenylephrine titrated to 25 mcg.     0945 CCU bed available for transfer, attempted to call report. Nurse will return call    0955 Report called to SHA Durand for transfer to room 2529.    1001 Phenylephrine titrated to 35 mcg.    1020 Patient transferred to CCU room.

## 2024-02-18 NOTE — PROGRESS NOTES
Basophils Absolute 0.0 0.0 - 0.1 K/UL    Absolute Immature Granulocyte 0.0 0.00 - 0.04 K/UL    Differential Type MANUAL      RBC Comment MACROCYTOSIS  1+        RBC Comment ANISOCYTOSIS  1+       Magnesium    Collection Time: 02/18/24  4:36 AM   Result Value Ref Range    Magnesium 2.3 1.6 - 2.4 mg/dL   Phosphorus    Collection Time: 02/18/24  4:36 AM   Result Value Ref Range    Phosphorus 4.8 (H) 2.6 - 4.7 MG/DL

## 2024-02-18 NOTE — PROGRESS NOTES
RRT called around 7:15 for hypotension  Patient seen and examined  Blood pressure 70s over 40s with MAP of 50, heart rate in 50s as well.  Patient is on amiodarone drip for A-fib  Nasal cannula @ 4L/M  Chest x-ray reviewed and showed pulm edema and bilateral effusions  B/L Upper extremity with pitting edema and difficulty maintaining IV access  Patient breathing comfortably on nasal cannula  Midodrine resumed at 10 mg 3 times daily  Will start Duy-Synephrine at a fixed dose and monitor vitals closely.  ICU team updated regarding the above.  Will continue monitoring in stepdown and if patient requires titrating pressors then will notify ICU team again  Need to maintain good peripheral IV access at all times.  Ruben Mcclelland MD  7:43 AM

## 2024-02-18 NOTE — CONSULTS
Pt seen and examined.     Full consult dictated     Agree with current Rx    Currently back in SR with HR 60    Cont IV amiodarone

## 2024-02-18 NOTE — PROGRESS NOTES
0130 RN notified NP [place] of pt's BP 87/48, HR 55. Verbal order given to pause  Amio infusion. Orders given for NS bolus.     0245 500 ml NS bolus started.     0300 NS bolus stopped.     0254 RN paged on call cardiologist.     0300 RN spoke with MD Andrade regarding HR 49 and BP 87/48, MD gave verbal conformation to continue the amio infusion. MD expressed frustration for being paged due to pt being asymptomatic and pt's baseline BP is low.  Amio infusion restarted.     0402 Pt's BP 84/52. New orders of 250 ml NS bolus given by NP    0417 NS bolus infusion started.     0628 NS bolus stopped.       Predictive Model Details          68 (Warning)  Factor Value    Calculated 2/18/2024 09:59 26% Systolic 65    Deterioration Index Model 21% Age 89 years old     16% Supplemental oxygen Nasal cannula     13% Neurological exam X     7% Potassium 5.1 mmol/L     6% Cardiac rhythm Sinus marily     3% Respiratory rate 18     3% WBC count abnormal (11.4 K/uL)     2% Pulse 53     1% BUN abnormal (34 MG/DL)     1% Platelet count abnormal (89 K/uL)     0% Temperature 96.9 °F (36.1 °C)     0% Pulse oximetry 97 %     0% Sodium 140 mmol/L     0% Hematocrit 35.1 %

## 2024-02-18 NOTE — PROGRESS NOTES
ICU Progress Note        Subjective:   02/15 - lying on bed.    - No complaints.    - patient was transferred out of ICU on . Re-consulted as patient had hypotension again after diuresis/beta-blocker.    On my arrival, she was on 15 mcg/min of phenylephrine. MAP 59. Mentating well.       Vital Signs:    BP (!) 91/54   Pulse 54   Temp 97 °F (36.1 °C) (Oral)   Resp 17   Ht 1.524 m (5')   Wt 76 kg (167 lb 9.6 oz)   SpO2 97%   BMI 32.73 kg/m²             Temp (24hrs), Av.9 °F (36.6 °C), Min:96.9 °F (36.1 °C), Max:98.8 °F (37.1 °C)       Intake/Output:   Last shift:       07 - 0  In: 221.6 [I.V.:221.6]  Out: 100 [Urine:100]  Last 3 shifts: 1901 -  0700  In: 2062.2 [I.V.:1898.7]  Out: 805 [Urine:805]    Intake/Output Summary (Last 24 hours) at 2024 1215  Last data filed at 2024 1200  Gross per 24 hour   Intake 1269.19 ml   Output 250 ml   Net 1019.19 ml         Physical Exam:    General: Not in acute distress  HEENT:  Anicteric sclerae; pink palpebral conjunctivae; mucosa moist  Resp:  Bilateral air entry +, no crackles or wheeze  CV:  S1, S2 present  GI:  Abdomen soft, non-tender; (+) active bowel sounds  Extremities:  +2 pulses on all extremities  Skin:  Warm; no rashes/ lesions noted  Neurologic:  Alert, awake and follows commands.      DATA:     Current Facility-Administered Medications   Medication Dose Route Frequency    midodrine (PROAMATINE) tablet 10 mg  10 mg Oral TID WC    phenylephrine (AGUILA-SYNEPHRINE) 50 mg in sodium chloride 0.9 % 250 mL infusion ([id number])   mcg/min IntraVENous Continuous    furosemide (LASIX) injection 80 mg  80 mg IntraVENous Once    [Held by provider] metoprolol succinate (TOPROL XL) extended release tablet 25 mg  25 mg Oral Daily    balsum peru-castor oil (VENELEX) ointment   Topical BID    cefepime (MAXIPIME) 1,000 mg in sodium chloride 0.9 % 50 mL IVPB (mini-bag)  1,000 mg IntraVENous Q12H    lactated ringers IV  soln infusion   IntraVENous Continuous    alcohol 62% (NOZIN) nasal  1 ampule  1 ampule Nasal Q12H    OXcarbazepine (TRILEPTAL) tablet 150 mg  150 mg Oral BID    aspirin EC tablet 81 mg  81 mg Oral Daily    miconazole (MICOTIN) 2 % cream   Topical BID    sodium chloride flush 0.9 % injection 5-40 mL  5-40 mL IntraVENous 2 times per day    sodium chloride flush 0.9 % injection 5-40 mL  5-40 mL IntraVENous PRN    0.9 % sodium chloride infusion   IntraVENous PRN    heparin (porcine) injection 5,000 Units  5,000 Units SubCUTAneous 3 times per day    ondansetron (ZOFRAN-ODT) disintegrating tablet 4 mg  4 mg Oral Q8H PRN    Or    ondansetron (ZOFRAN) injection 4 mg  4 mg IntraVENous Q6H PRN    polyethylene glycol (GLYCOLAX) packet 17 g  17 g Oral Daily PRN    acetaminophen (TYLENOL) tablet 650 mg  650 mg Oral Q6H PRN    Or    acetaminophen (TYLENOL) suppository 650 mg  650 mg Rectal Q6H PRN         Recent Results (from the past 24 hour(s))   Basic Metabolic Panel    Collection Time: 02/17/24  3:31 PM   Result Value Ref Range    Sodium 142 136 - 145 mmol/L    Potassium 4.7 3.5 - 5.1 mmol/L    Chloride 109 (H) 97 - 108 mmol/L    CO2 27 21 - 32 mmol/L    Anion Gap 6 5 - 15 mmol/L    Glucose 177 (H) 65 - 100 mg/dL    BUN 31 (H) 6 - 20 MG/DL    Creatinine 1.27 (H) 0.55 - 1.02 MG/DL    Bun/Cre Ratio 24 (H) 12 - 20      Est, Glom Filt Rate 39 (L) >60 ml/min/1.73m2    Calcium 8.6 8.5 - 10.1 MG/DL   CBC with Auto Differential    Collection Time: 02/17/24  3:31 PM   Result Value Ref Range    WBC 9.3 3.6 - 11.0 K/uL    RBC 3.14 (L) 3.80 - 5.20 M/uL    Hemoglobin 9.7 (L) 11.5 - 16.0 g/dL    Hematocrit 33.0 (L) 35.0 - 47.0 %    .1 (H) 80.0 - 99.0 FL    MCH 30.9 26.0 - 34.0 PG    MCHC 29.4 (L) 30.0 - 36.5 g/dL    RDW 18.9 (H) 11.5 - 14.5 %    Platelets 216 150 - 400 K/uL    MPV 10.6 8.9 - 12.9 FL    Nucleated RBCs 0.6 (H) 0  WBC    nRBC 0.06 (H) 0.00 - 0.01 K/uL    Neutrophils % 79 (H) 32 - 75 %    Lymphocytes %

## 2024-02-18 NOTE — PROGRESS NOTES
Occupational Therapy    OT referral received, chart reviewed and discussed with RN. Pt remains hypotensive and just transferred to CCU for higher level of care. Pt not medically appropriate at this time, therefore OT will defer and follow up.

## 2024-02-19 PROBLEM — R45.1 RESTLESSNESS: Status: ACTIVE | Noted: 2024-01-01

## 2024-02-19 PROBLEM — Z51.5 COMFORT MEASURES ONLY STATUS: Status: ACTIVE | Noted: 2024-01-01

## 2024-02-19 PROBLEM — R54 FRAILTY: Status: ACTIVE | Noted: 2024-01-01

## 2024-02-19 PROBLEM — Z51.5 PALLIATIVE CARE BY SPECIALIST: Status: ACTIVE | Noted: 2024-01-01

## 2024-02-19 NOTE — PROGRESS NOTES
0715- assume care of pt.   0800- assessment complete. See flowsheet. Daughters at bedside.   1100- palliative care at bedside.   1130- pt appears uncomfortable. Expressive aphasia noted. Administer pain medication as noted as per MAR.  1145- rounds complete. Family participate in rounds.   1200- reassessment complete. No changes noted.   1400- call bella from hospice per family request regarding time frame. Bella states that she should be available in about 30 min-1hr. Inform family. Family verbalizes understanding. Pt resting in bed with eyes closed. Respirations normal,unlabored. Nsr noted on monitor.   1500- family meeting with hospice  1600- reassessment complete. No changes noted. Family at bedside.   1730- give report to amanda gutierrez. Transfer pt to room 3401. Family ay bedside. Nurse holly at bedside.

## 2024-02-19 NOTE — CONSULTS
Palliative Medicine  Patient Name: Cathy Jones  YOB: 1927  MRN: [id number]  Age: 89 y.o.  Gender: female    Date of Initial Consult: 2/19/24  Date of Service: 2/19/2024  Time: 1:31 PM  Provider: CHANTALE Flores NP  Hospital Day: 7  Admit Date: 2/13/2024  Referring Provider: Rahda Medrano MD    Reasons for Consultation:  Goals of Care and Overwhelming Symptoms    HISTORY OF PRESENT ILLNESS (HPI):   Cathy Jones is a 89 y.o. female with a past medical history of HTN, CHG, and CVA in 2016, who was admitted on 2/13/2024 from her [facility] with a diagnosis of Septic shock, .     PALLIATIVE DIAGNOSES:    Restlessness  Comfort Measures Only  Frailty  Palliative Encounter    ASSESSMENT AND PLAN:   Met with Ms Jones and two of her daughters at bedside- the other two are set to arrive this afternoon  She has already been made comfort care, but we were asked to follow along for symptom management until hospice has a chance to evaluate her.   On my assessment, she is clearly uncomfortable, and restless. Her daughters share that she is better than before, as earlier she was not tolerating medical interventions.  Once this has stopped, and we are not stimulating her, she has been calmer.  Even so, she is trying to get comfortable when I arrived, and keeps repeating \"pillow\" but no amount of repositioning is helping.  I think that perhaps her deficits from her stroke are hindering her ability to accurately tell me what is bothering her  She has no IV access, so we are limited to SL dosing (Or Sub q if this fails). At the time of my visit, she has not had any medications for comfort in the last few hours, so I asked nursing to please give her a dose of the Oxycodone that has been ordered.    I am going to add in a scheduled dose as well, every 6 hours, in order to keep some in her system so hopefully we are not playing catchup (given lack of IV access)  Recommended that she re-evaluate at least every 1 hours  discuss with social work during IDT    Anticipatory grief assessment:   [x] Normal  / [] Maladaptive     If \"Maladaptive\" to discuss with social work during IDT    ESAS Anxiety: Anxiety Score: 3    ESAS Depression: Depression Score: Not depressed        LAB AND IMAGING FINDINGS:   Objective data reviewed:  labs, records, medication use, vitals, and chart     FINAL COMMENTS   Thank you for allowing Palliative Medicine to participate in the care of Cathy Jones.    Electronically signed by   CHANTALE Flores NP  Palliative Care Team  on 2/19/2024 at 1:31 PM

## 2024-02-19 NOTE — PROGRESS NOTES
End of Shift Note    Bedside shift change report given to SHA Lora (oncoming nurse) by BRIGIDA PENA RN (offgoing nurse).  Report included the following information SBAR, Kardex, and MAR    Shift worked:  6:05pm-7pm     Shift summary and any significant changes:     Pt arrived to this unit from CCU; report received from SHA Ruvalcaba. Pt tolerated care fairly well. Medications were given and education was provided. Hourly rounding completed. Pt made no complaints of pain during this shift. Daughters present at bedside. Pt in bed resting comfortably.            BRIGIDA PENA RN

## 2024-02-19 NOTE — PROGRESS NOTES
[facility]  Good Help to Those in Need  [phone]    Nursing Note   Patient Name: Cathy Jones  YOB: 1927  Age: 89 y.o.    [facility] RN Note:      Referral received and hospice consult noted. Chart reviewed.     Thank you for the opportunity to be of service to this patient.  Will see and assess patient today then contact family if they are not at bedside.  This writer already has 2 other consults with scheduled family meetings at 11am and 1pm.  After these meetings, will see patient.  If there are any questions, please contact this writer or the main [facility] number (see below).    BRUCE Sandhu, RN, [facility]  Hospice Liaison  [phone] (mobile)  [phone] (main [facility] number)  Available on [employer]    0508: Entered room, patient in bed sleeping and appears comfortable, family at bedside.  Met with Vahid Spears Jean, Lynne (all daughters of the patient).  Informed them that, at this time, the patient is not meeting the GIP hospice level of care (unable to take PO medications and requiring IV medications for symptom burden).  Informed that our team will continue to monitor patient for GIP criteria daily.  Provided [facility] contact number.

## 2024-02-19 NOTE — PROGRESS NOTES
Critical Care Progress Note  Radha Medrano MD          Date of Service:  2024  NAME:  Cathy Jones  :  1927  MRN:  [id number]      Subjective/Hospital course:      02/15 - lying on bed.    - No complaints.    - patient was transferred out of ICU on . Re-consulted as patient had hypotension again after diuresis/beta-blocker.   - patient transitioned to palliative / comfort care   No iv access           Assessment/Plan:     - Will likely need inpatient hospice.  Consult placed overnight  - consult palliative care to help with sx management until hospice can provide service    Review of Systems:   Review of Systems   No new complaints       Vital Signs:   No data found.     Intake/Output Summary (Last 24 hours) at 2024 0852  Last data filed at 2024 1935  Gross per 24 hour   Intake 840.41 ml   Output --   Net 840.41 ml        Physical Examination:    Physical Exam  General: Not in acute distress  HEENT:  Anicteric sclerae; pink palpebral conjunctivae; mucosa moist  Resp:  Bilateral air entry +, no crackles or wheeze  CV:  S1, S2 present  GI:  Abdomen soft, non-tender; (+) active bowel sounds  Extremities:  +2 pulses on all extremities  Skin:  Warm; no rashes/ lesions noted  Neurologic:  Alert, awake and follows commands.    Labs and Imaging:   Reviewed.      Medications:     Current Facility-Administered Medications   Medication Dose Route Frequency    alcohol 62% (NOZIN) nasal  1 ampule  1 ampule Nasal Q12H    LORazepam (ATIVAN) 2 MG/ML concentrated solution 1 mg  1 mg SubLINGual Q4H PRN    oxyCODONE (ROXICODONE INTENSOL) 100 MG/5ML concentrated solution 5 mg  5 mg SubLINGual Q4H PRN    glycopyrrolate (ROBINUL) injection 0.2 mg  0.2 mg IntraVENous Q8H PRN     ______________________________________________________________________  EXPECTED LENGTH OF STAY: 16  ACTUAL LENGTH OF STAY:          6                 Radha Medrano MD   Pulmonary/CCM  [employer]

## 2024-02-19 NOTE — PROGRESS NOTES
Patient's family requested to speak about EOL care options. Prior to the conversation, the patient was DNR/DNI, no escalation and pressors had been stopped.  I spoke with two of the patient's daughters and they expressed they wished to understand the differences between comfort care v palliative care v hospice.  We discussed EOL care options.  The family expressed that they wished to discontinue all curative measures such as antibiotics, VS, labs and that we should only do interventions that would enhance comfort.  They said that they would prefer inpatient hospice if this were an option.  I placed a consult for hospice.  PRN orders placed for EOL dyspnea and EOL anxiety.  SL medications utilized as patient does not have IV access and family wishes to defer placement of additional IV access as this caused the patient significant distress.    Carole Bedoya, ROSY

## 2024-02-19 NOTE — PROGRESS NOTES
1930: Bedside shift change report given to Miracle (oncoming nurse) by Sumi (offgoing nurse). Report included the following information Nurse Handoff Report, Index, Intake/Output, MAR, Recent Results, and Cardiac Rhythm sinus marily .     2045: Shift assessment complete. Patient repositioned at this time. Patient family is at bedside and requesting comfort measures only. ROSY Bedoya notified. See Flowsheets and MAR for more details.    2200: Patient now CMO. Consult for hospice placed by Intensivist.    0000: Reassessment complete. See Flowsheets and MAR for more details.    0400: Reassessment complete. See Flowsheets and MAR for more details.    0700: Bedside shift change report given to Sherri (oncoming nurse) by Miracle (offgoing nurse). Report included the following information Nurse Handoff Report, Index, Intake/Output, MAR, Recent Results, and Cardiac Rhythm sinus marily .

## 2024-02-20 NOTE — PROGRESS NOTES
Hospitalist Progress Note    NAME:   Cathy Jones   : 1927   MRN: [id number]     Date/Time: 2024 7:27 PM  Patient PCP: RADHA Mcnamara IV, MD    Estimated discharge date: greater than 24 hours.  Barriers: Hospice consulted. Comfort care. Palliative care and CM following.      Assessment / Plan:  Septic shock        Severe sepsis urosepsis versus pneumonia   - patient off pressors  - negative influenza and COVID 19  - urinalysis positive for UTI  - urine culture grew proteus mirabilis  - CT abdomen/pelvis on 24 shows:  No identified cause for sepsis  Small pleural effusions  Probable cholelithiasis  Chronic renal changes with left hydronephrosis and large left extra renal pelvis  Colonic diverticulosis  - patient was treated with ceftriaxone and azithromycin  - transitioned to comfort care 24  - hospice and palliative care consulted    2. Atrial fibrillation with RVR - now in sinus bradycardia      CHF with reduced EF  - chest xray on 24 shows:  Moderate pulmonary edema with new small bilateral pleural effusions  - echo on 24 shows:  EF of 35%-40%  - patient transitioned to comfort are 24  - hospice and palliative care consulted      3. DAPHNIE  - patient on comfort care    4. Chronic aphasia      History of stroke  - CT of head on 24 shows:  No acute process or change  - patient on comfort care        5. Right leg wound  - continue current wound care    Medical Decision Making:   I personally reviewed labs: none to review on today as patient transitioned to comfort care.  I personally reviewed imaging: yes  I personally reviewed EKG: yes  Toxic drug monitoring: none, comfort care  Discussed case with: patient, daughters, nursing, CM, Dr. WAYLON Valadez.        Code Status: DNR  DVT Prophylaxis: none, comfort care    Subjective:     Chief Complaint / Reason for Physician Visit \"Patient is non-verbal, resting in bed with eyes closed no acute distress noted\"     Mrs. Jones is a

## 2024-02-20 NOTE — PROGRESS NOTES
Responded to page to Oncology unit to offer pastoral support to daughters of patient nearing end of life.  Reviewed chart prior to visit.  Patient has four daughters; three were present.....Tory, Bernie, and Jake.  Daughter Vahid was on her way back to the hospital.  Patient's health has been declining since a stroke in 2016 and more so after she contracted COVID.  Tory indicated they realize their mother is weary.  She describes a very loving relationship between patient and her daughters and grandchildren.  Ms Cathy has a steadfast isaiah even though the [employer] she belonged to had dissolved.  She had been a member of the [employer], but her chapter had also dissolved.   offered supportive listening presence and shared prayer with daughters gathered at bedside.  Advised of ongoing  availability.      CHRISTY Dockery, BCC, Staff   [facility]     Paging Service  [phone])

## 2024-02-20 NOTE — PROGRESS NOTES
Hospice RN Note: pt is not appropriate for inpatient hospice LOC as she is tolerating SL dosing of medications for symptoms. Pt does not have IV access and family did not want to have an IV placed, pt has edema to arms so this might be difficult but the SL dosing is controlling her symptoms. Reviewed with hospice medical director, we will continue to follow for decline.     Spoke with daughter Bernie at bedside and reviewed end of life process in detail, GFMS booklet provided. Reviewed the need to continue w/ scheduled SL dosing of medications and not hold doses. She had wanted them held so pt could interact with her some. We discussed the trade-off is that pt will be comfortable but less interactive. She is comfortable on current regimen and dtr is happy with the care she is receiving. Did order a scopolamine patch for secretions today under Dr. Valadez and a comfort cart for dtr. Will continue to follow daily.

## 2024-02-20 NOTE — PROGRESS NOTES
.End of Shift Note    Bedside shift change report given to Mireya DALTON  (oncoming nurse) by Gloria Soto RN (offgoing nurse).  Report included the following information SBAR, Kardex, Intake/Output, MAR, Recent Results, and Med Rec Status    Shift worked:  [id number]     Shift summary and any significant changes:     Pt given prescribed meds per MAR. One PRN Ativan for restlessness. Scopolamine patch on behinds RT ear. Mouth care done throughout the day, educated daughter at bedside how to moisten pt tongue with sponges.            Gloria Soto, RN

## 2024-02-20 NOTE — PROGRESS NOTES
Hospitalist Progress Note    NAME:   Cathy Jones   : 1927   MRN: [id number]     Date/Time: 2024 5:27 PM  Patient PCP: RADHA Mcnamara IV, MD    Estimated discharge date: greater than 24 hours.  Barriers: Hospice consulted. Comfort care. Palliative care and CM following.      Assessment / Plan:  Severe sepsis  Urosepsis vs pneumonia  Septic shock  UA (+) proteus mirabilis  Sp rocephin, azithromycin  Transitioned to comfort care 24  Hospice and palliative care following  Do not resuscitate  Withdrawal any therapies not aimed directly at comfort or preservation of dignity  Okay for nasal cannula for perceived comfort  No IV access, comfort measure meds via SL/topical/suppository formulation  Scheduled SL dilaudid, cont scopalamine topical  PRN tylenol supp, atropine SL, phenergan supp, SL dilaudid, ativan  Hospice following however pt does not meet criteria for GIP (no unmanaged symptoms or IV medication requirements)  Continue CMO    Medical Decision Making:   I personally reviewed labs: Comfort Measures Only  I personally reviewed imaging: Comfort Measures Only  I personally reviewed EKG:   Toxic drug monitoring:   Discussed case with: Patient, Daughters, Nurse, Attending MD    Code Status: DO NOT RESUSCITATE  DVT Prophylaxis: NOT INDICATED    Subjective:     Chief Complaint / Reason for Physician Visit  Pt resting in bed eyes closed, in no acute distress or apparent discomfort.  Daughters at bedside.  Overnight events reviewed with RN.    Excerpt from H&P:  Mrs. Jones is a 89 year old female with history of HTN, HF has had 2 hospitalizations (2023 for Covid/ HF and December for RLE wound cellulitis) and has deteriorating functional status from walker to wheel chair. She currently lives at assisted living and and was sent to ER on 24 for evaluation of low BP there, not feeling well describing as generalized malaise/ weakness. Patient was subsequently admitted to the ICU and

## 2024-02-20 NOTE — CARE COORDINATION
CM: Romina Joseph is currently working with pt in the Onc Unit. CM informed that pt is currently listed as comfort care, and hospice is currently following pts progress.  CM will continue to follow.    OSMAR Tillman CM  [phone]

## 2024-02-20 NOTE — PROGRESS NOTES
1015: rounded on pt.  Pt moaning and facial grimacing. Pt also coughing/ making gaging noise.  Daughter at bedside concerned for nausea.  Received schedule Dilaudid at 0930.  PRN Ativan given at this time to help with restlessness and nausea.  Daughter grateful.  Updated Peg RN with hospice team.      1130: Rounded back on pt/family.  Pt resting much more comfortably.  Informed daughter medication can be given q1h PRN.  Advised that staff would be rounding frequently, and advised that if she notes any sign of restlessness/discomfort to let staff know.

## 2024-02-20 NOTE — PROGRESS NOTES
Palliative Medicine  Patient Name: Cathy Jones  YOB: 1927  MRN: [id number]  Age: 89 y.o.  Gender: female    Date of Initial Consult: 2/19/24  Date of Service: 2/20/2024  Time: 2:08 PM  Provider: CHANTALE Flores NP  Hospital Day: 8  Admit Date: 2/13/2024  Referring Provider: Radha Medrano MD    Reasons for Consultation:  Goals of Care and Overwhelming Symptoms    HISTORY OF PRESENT ILLNESS (HPI):   Cathy Jones is a 89 y.o. female with a past medical history of HTN, CHG, and CVA in 2016, who was admitted on 2/13/2024 from her [facility] with a diagnosis of Septic shock, .     PALLIATIVE DIAGNOSES:    Restlessness  Comfort Measures Only  Frailty  Palliative Encounter    ASSESSMENT AND PLAN:   Ms Jones was sleeping soundly at the time of my arrival- family had stepped out to get lunch.  She appears quite comfortable  Noted the addition of a scolp patch this am- agree with this addition  No other recs at this time  For now no plans to round daily as she is quite comfortable and hospice is also checking in on her.  Please let me know if she becomes symptomatic despite her ordered medications and I will come back to see her  Please call with any palliative questions or concerns.  Palliative Care Team is available via perfect serve or via phone.    Referrals to:   [] Outpatient Palliative Care  [] Home Based Palliative Care  [] Home Based Primary Care  [x] Hospice     ADVANCE CARE PLANNING:   [x] The [facility] Interdisciplinary Team has updated the ACP Navigator with Health Care Decision Maker and Patient Capacity      Primary Decision Maker: Tory Moore - Child - [phone]    Secondary Decision Maker: Deanna Brito    Supplemental (Other) Decision Maker: Vahid Turcios - Child - [phone]    Supplemental (Other) Decision Maker: Jake Bernabe - Child - [phone]  Confirm Advance Directive: Yes, on file    Current Code Status: DNR     Goals of Care: Goals of Care and

## 2024-02-21 NOTE — PROGRESS NOTES
Hospice Medical Director    Have had multiple discussions with our team concerning Cathy. Patient currently under comfort care and tolerating SL medications for her symptoms and according to all notes she is comfortable. While patients could hypothetically meet GIP LOC while taking oral meds, we would need to show frequent nursing assessments along with multiple dose adjustments-none of which is needed at this time and as a matter of fact, family had held several of the scheduled oral meds so she could be \"more awake\". Patient clearly appropriate for hospice care under routine level of care which we can not do in the hospital. We had offered the [facility] hospice [place] to the family which they declined as we can do all levels of care at [facility] to include routine. While I know this can be frustrating, we need to follow the CMS guidelines for GIP level of care and at this point, she does not meet that criteria. I know disposition also an issue as family does not want her to return home and that is something we can not correct/fix. We will continue to follow closely as we know these situations can change and if symptom burden were to worsen and need for escalation of medications frequently or the need to transition to SC/IV meds, then obviously we would reconsider GIP level of care. Not all patients who are dying will meet GIP level of care and right now all of the medications currently being provided could be done in the home setting.

## 2024-02-21 NOTE — PROGRESS NOTES
End of Shift Note    Bedside shift change report given to SHA Driver (oncoming nurse) by Mireya Jama RN (offgoing nurse).  Report included the following information SBAR, Kardex, Intake/Output, and MAR    Shift worked:  7P-7A     Shift summary and any significant changes:     Scheduled medications were given, see MAR. PRN Atropine was given for secretions X2. Hygiene care was provided. Family is at the bedside. Frequent rounding has been done.     Concerns for physician to address:       Zone phone for oncoming shift:            Mireya Jama RN

## 2024-02-21 NOTE — PROGRESS NOTES
.End of Shift Note    Bedside shift change report given to Mireya DALTON  (oncoming nurse) by Gloria Soto RN (offgoing nurse).  Report included the following information SBAR, Kardex, Intake/Output, MAR, Recent Results, and Med Rec Status    Shift worked:  4357-0500     Shift summary and any significant changes:     Pt given prescribed med's per MAR. Mouth care completed. PRN Ativan given once on shift. Daughters at bedside. Dilaudid held at 4:00pm per family, pt observed to be comfortable.           Gloria Soto, RN

## 2024-02-21 NOTE — PROGRESS NOTES
Hospitalist Progress Note    NAME:   Cathy Jones   : 1927   MRN: [id number]     Date/Time: 2024 5:39 PM  Patient PCP: RADHA Mcnamara IV, MD    Estimated discharge date: TBD  Barriers: Hospice consulted. Comfort care. Palliative care and CM following.      Assessment / Plan:  Severe sepsis  Urosepsis vs pneumonia  Septic shock  UA (+) proteus mirabilis  Sp rocephin, azithromycin  Transitioned to comfort care 24  Hospice and palliative care following  Do not resuscitate  Withdrawal any therapies not aimed directly at comfort or preservation of dignity  Okay for nasal cannula for perceived comfort  No IV access, comfort measure meds via SL/topical/suppository formulation  Scheduled SL dilaudid, cont scopalamine topical  PRN tylenol supp, atropine SL, phenergan supp, SL dilaudid, ativan  Hospice following however pt does not meet criteria for GIP (no unmanaged symptoms or IV medication requirements)  Continue CMO    Medical Decision Making:   I personally reviewed labs: Comfort Measures Only  I personally reviewed imaging: Comfort Measures Only  I personally reviewed EKG:   Toxic drug monitoring:   Discussed case with: Patient, Daughters, Nurse, Attending MD    Code Status: DO NOT RESUSCITATE  DVT Prophylaxis: NOT INDICATED    Subjective:     Chief Complaint / Reason for Physician Visit  Pt resting in bed eyes closed, in no acute distress or apparent discomfort.  Daughters at bedside, report pt has had fewer secretions and has been comfortable overnight, was able to open her eyes and acknowledge family members this AM.  Overnight events reviewed with RN.    Excerpt from H&P:  Mrs. Jones is a 89 year old female with history of HTN, HF has had 2 hospitalizations (2023 for Covid/ HF and December for RLE wound cellulitis) and has deteriorating functional status from walker to wheel chair. She currently lives at assisted living and and was sent to ER on 24 for evaluation of low BP

## 2024-02-22 NOTE — PROGRESS NOTES
End of Shift Note    Bedside shift change report given to SHA Martinez (oncoming nurse) by Mireya Jama RN (offgoing nurse).  Report included the following information SBAR, Kardex, Intake/Output, and MAR    Shift worked:  7P-7A     Shift summary and any significant changes:     Patient is less interactive. No PRN medications needed for the shift. Family is at the bedside. Hygiene care was provided. Frequent rounding has been done.   Concerns for physician to address:       Zone phone for oncoming shift:              Mireya Jama RN

## 2024-02-22 NOTE — PROGRESS NOTES
.End of Shift Note    Bedside shift change report given to SHA Lanier (oncoming nurse) by Brooke Boyd LPN (offgoing nurse).  Report included the following information SBAR, Kardex, and MAR    Shift worked:  7a-7p     Shift summary and any significant changes:     Patient remains on comfort care. Medications given per MAR, education provided, family present at bedside and given updates.         Brooke Boyd LPN

## 2024-02-23 NOTE — PROGRESS NOTES
Hospitalist Progress Note    NAME:   Cathy Jones   : 1927   MRN: [id number]     Date/Time: 2024 8:01 PM  Patient PCP: RADHA Mcnamara IV, MD    Estimated discharge date: TBD  Barriers: Hospice consulted. Comfort care. Palliative care and CM following.      Assessment / Plan:  Severe sepsis  Urosepsis vs pneumonia  Septic shock  UA (+) proteus mirabilis  Sp rocephin, azithromycin  Transitioned to comfort care 24  Hospice and palliative care following  Do not resuscitate  Withdrawal any therapies not aimed directly at comfort or preservation of dignity  Okay for nasal cannula for perceived comfort  No IV access, comfort measure meds via SL/topical/suppository formulation  Scheduled SL dilaudid, cont scopalamine topical  PRN tylenol supp, atropine SL, phenergan supp, SL dilaudid, ativan  Hospice following however pt does not meet criteria for GIP (no unmanaged symptoms or IV medication requirements)  Continue CMO    Medical Decision Making:   I personally reviewed labs: Comfort Measures Only  I personally reviewed imaging: Comfort Measures Only  I personally reviewed EKG:   Toxic drug monitoring:   Discussed case with: Patient, Daughters, Nurse, Attending MD    Code Status: DO NOT RESUSCITATE  DVT Prophylaxis: NOT INDICATED    Subjective:     Chief Complaint / Reason for Physician Visit  Pt resting in bed eyes closed, in no acute distress or apparent discomfort.  No new symptoms per family or RN.  Well managed with SL meds.  Less response today than yesterday.  Overnight events reviewed with RN.    Excerpt from H&P:  Mrs. Jones is a 89 year old female with history of HTN, HF has had 2 hospitalizations (2023 for Covid/ HF and December for RLE wound cellulitis) and has deteriorating functional status from walker to wheel chair. She currently lives at assisted living and and was sent to ER on 24 for evaluation of low BP there, not feeling well describing as generalized malaise/  weakness. Patient was subsequently admitted to the ICU and transitioned to comfort care on 2/18/24. Hospice and palliative care following.  Discussed with RN events overnight.       Objective:     VITALS:   Last 24hrs VS reviewed since prior progress note. Most recent are:  Patient Vitals for the past 24 hrs:   BP Temp Temp src Pulse Resp SpO2   02/22/24 1921 130/61 97.5 °F (36.4 °C) Axillary 95 19 94 %   02/22/24 0756 (!) 122/58 97.7 °F (36.5 °C) Axillary (!) 116 17 (!) 84 %         No intake or output data in the 24 hours ending 02/22/24 2001       I had a face to face encounter and independently examined this patient on 2/22/2024, as outlined below: patient observed resting in bed with eyes closed. Non-verbal, no acute distress noted.  PHYSICAL EXAM:  General: Lethargic, non-verbal, minimally responsive to painful stimuli  EENT:  EOMI. Anicteric sclerae.  Resp:  CTA bilaterally, no wheezing or rales.  No accessory muscle use  CV:  Regular  rhythm,  No edema  GI:  Soft, Non distended, Non tender.  +Bowel sounds  Neurologic/Psych:  Lethargic, non-verbal, minimally responsive to painful stimuli.   Skin:  No rashes.  No jaundice.Dressing is clean, dry and intact to right lower extremity.     Reviewed most current lab test results and cultures  YES  Reviewed most current radiology test results   YES  Review and summation of old records today    NO  Reviewed patient's current orders and MAR    YES  PMH/SH reviewed - no change compared to H&P    Procedures: see electronic medical records for all procedures/Xrays and details which were not copied into this note but were reviewed prior to creation of Plan.      LABS:  I reviewed today's most current labs and imaging studies.  Pertinent labs include:  No results for input(s): \"WBC\", \"HGB\", \"HCT\", \"PLT\" in the last 72 hours.    No results for input(s): \"NA\", \"K\", \"CL\", \"CO2\", \"GLUCOSE\", \"BUN\", \"CREATININE\", \"CALCIUM\", \"MG\", \"PHOS\", \"LABALBU\", \"BILITOT\", \"AST\", \"ALT\", \"INR\"

## 2024-02-23 NOTE — PROGRESS NOTES
End of Shift Note    Bedside shift change report given to SHA Martinez (oncoming nurse) by Mireya Jama RN (offgoing nurse).  Report included the following information SBAR, Kardex, Intake/Output, and MAR    Shift worked:  7P-7A     Shift summary and any significant changes:     Scheduled medications were given, see MAR. Family is at the bedside. No PRN medications were needed. Hygiene care was provided. Frequent rounding has been done.     Concerns for physician to address:       Zone phone for oncoming shift:              Mireya Jama RN

## 2024-02-23 NOTE — PROGRESS NOTES
.End of Shift Note    Bedside shift change report given to SHA aFrah (oncoming nurse) by Brooke Boyd LPN (offgoing nurse).  Report included the following information SBAR, Kardex, and MAR    Shift worked:  7a-7p     Shift summary and any significant changes:    Patient remains on comfort care. Medications given per MAR, education provided, family present at bedside and given updates.

## 2024-02-23 NOTE — PROGRESS NOTES
[facility]  [facility] Help to Those in Need  [phone]    Hospice Liaison Note   Patient Name: Cathy Jones  YOB: 1927  Age: 89 y.o.      Hospice Liaison Note:    Chart reviewed for updates in plan of care. Per bedside RN, patient is unresponsive, however, continues to tolerate SL route dosing. Suggest RN contact palliative team to visit with family bedside.    Plan: Continue to monitor patient for symptom buren and evaluate patient for Hospice Plan of Care.     11:00: Reviewed patient with Dr. Hendrickson. Per Dr. Hendrickson, patient meets Routine Hospice criteria for the diagnosis of Heart Failure. Pt does not meet criteria for GIP inpatient hospice care.    13:49: Per documentation, patient continues to tolerate SL route dosing and has not required PRN dosing.    Please call Hospice team to offer support for patient, family or staff.    Thank you for your coordination with the hospice plan of care      Arlyn Kendrick RN, [facility]  Hospice Nurse Liaison  [phone] [place]  [phone] Office

## 2024-02-24 NOTE — PROGRESS NOTES
responded to page for death of patient in [phone]. Four daughters were present and seemed in good spirit. They shared that they were at peace with their mother's passing. She is a strong woman of isa[place] and so they know she was resting with her maker.offered supportive listening presence and shared prayer with daughters gathered at bedside. They expressed appreciation for the support.     Visited by: Chaplain Jim Padilla M.Div., [facility].   Paging Service: [phone])

## 2024-02-24 NOTE — DISCHARGE SUMMARY
any therapies not aimed directly at comfort or preservation of dignity  Okay for nasal cannula for perceived comfort  No IV access, comfort measure meds via SL/topical/suppository formulation  Scheduled SL dilaudid, cont scopalamine topical  PRN tylenol supp, atropine SL, phenergan supp, SL dilaudid, ativan  Hospice following however pt does not meet criteria for GIP (no unmanaged symptoms or IV medication requirements)  Comfort measures only    Patient declared dead on 2/24/2024 at 4:07pm by Renny Torres NP.  Attending Dr. Noe henry. See Death Note from that date/time.    Discharge Exam:  Patient seen and examined by me on discharge day.  Pertinent Findings:  Patient Vitals for the past 24 hrs:   BP Temp Temp src Pulse Resp SpO2   02/24/24 0845 (!) 86/37 -- Axillary (!) 49 12 91 %   02/23/24 1930 (!) 76/39 (!) 94.3 °F (34.6 °C) Axillary 88 14 91 %     Patient was examined and the following were absent: Pulses, Blood Pressure, and Respiratory effort     Physical Exam  No corneal reflex.  No heart sounds on auscultation.  No spontaneous breath sounds.  No withdrawal from painful stimuli.      I declared the patient dead on 2/24/2024 at 4:07 PM     Preliminary Cause of Death: Severe sepsis    Discharge/Recent Laboratory Results:  No results for input(s): \"NA\", \"K\", \"CL\", \"CO2\", \"BUN\", \"CREATININE\", \"GLUCOSE\", \"CALCIUM\", \"PHOS\", \"MG\" in the last 72 hours.  No results for input(s): \"HGB\", \"HCT\", \"WBC\", \"PLT\" in the last 72 hours.    Discharge Medications:     Medication List        ASK your doctor about these medications      acetaminophen 500 MG tablet  Commonly known as: TYLENOL     amLODIPine 2.5 MG tablet  Commonly known as: NORVASC     aspirin 81 MG EC tablet     diclofenac sodium 1 % Gel  Commonly known as: VOLTAREN     furosemide 40 MG tablet  Commonly known as: LASIX  Take 1 tablet by mouth in the morning and 1 tablet in the evening.     metoprolol succinate 25 MG extended release tablet  Commonly known as:

## 2024-02-24 NOTE — PROGRESS NOTES
End of Shift Note    Bedside shift change report given to Mary DALTON (oncoming nurse) by Gagan Galeas RN (offgoing nurse).  Report included the following information SBAR, Kardex, and MAR    Shift worked: Night     Shift summary and any significant changes:    Turned/repositioned as necessary.Remained non- responsive. P.O Dilaudid given per schedule. Her two daughters spent the night at her bedside         Gagan Galeas RN

## 2024-02-24 NOTE — PROGRESS NOTES
Hospitalist Progress Note    NAME:   Cathy Jones   : 1927   MRN: [id number]     Date/Time: 2024 8:30 PM  Patient PCP: RADHA Mcnamara IV, MD    Estimated discharge date: TBD  Barriers: Hospice consulted. Comfort care. Palliative care and CM following.  If pt remains in current medical status by Monday, encouraged family to consider possibly transitioning back to snf with hospice care if feasible.    Assessment / Plan:  Severe sepsis  Urosepsis vs pneumonia  Septic shock  UA (+) proteus mirabilis  Sp rocephin, azithromycin  Transitioned to comfort care 24  Hospice and palliative care following  Do not resuscitate  Withdrawal any therapies not aimed directly at comfort or preservation of dignity  Okay for nasal cannula for perceived comfort  No IV access, comfort measure meds via SL/topical/suppository formulation  Scheduled SL dilaudid, cont scopalamine topical  PRN tylenol supp, atropine SL, phenergan supp, SL dilaudid, ativan  Hospice following however pt does not meet criteria for GIP (no unmanaged symptoms or IV medication requirements)  Continue CMO    Medical Decision Making:   I personally reviewed labs: Comfort Measures Only  I personally reviewed imaging: Comfort Measures Only  I personally reviewed EKG:   Toxic drug monitoring:   Discussed case with: Patient, Daughters, Nurse, Attending MD    Code Status: DO NOT RESUSCITATE  DVT Prophylaxis: NOT INDICATED    Subjective:     Chief Complaint / Reason for Physician Visit  Pt resting in bed eyes closed, in no acute distress or apparent discomfort.  No new symptoms per family or RN.  Well managed with SL meds.  Less responsive today than yesterday.  Discussed with daughters at bedside.  Overnight events reviewed with RN.    Excerpt from H&P:  Mrs. Jonse is a 89 year old female with history of HTN, HF has had 2 hospitalizations (2023 for Covid/ HF and December for RLE wound cellulitis) and has deteriorating functional status  \"HGB\", \"HCT\", \"PLT\" in the last 72 hours.    No results for input(s): \"NA\", \"K\", \"CL\", \"CO2\", \"GLUCOSE\", \"BUN\", \"CREATININE\", \"CALCIUM\", \"MG\", \"PHOS\", \"LABALBU\", \"BILITOT\", \"AST\", \"ALT\", \"INR\" in the last 72 hours.      Signed: CHANTALE Parks  Total non critical care time spent 25 minutes

## 2024-02-24 NOTE — DEATH NOTES
Death Pronouncement Note  Patient's Name: Cathy Jones   Patient's YOB: 1927  MRN Number: [id number]    Admitting Provider: Eligio Gaxiola MD  Attending Provider: Richelle Liu MD    Patient was examined and the following were absent: Pulses, Blood Pressure, and Respiratory effort    Physical Exam  No corneal reflex.  No heart sounds on auscultation.  No spontaneous breath sounds.  No withdrawal from painful stimuli.     I declared the patient dead on 2/24/2024 at 4:07 PM    Preliminary Cause of Death: Severe sepsis       Electronically signed by CHANTALE Parks on 2/24/24 at 4:33 PM EST

## 2024-02-24 NOTE — PROGRESS NOTES
Pageeddy dupont for patient's passing. Patient's daughters at bedside.    1649: [facility]. Reference number [id number].